# Patient Record
Sex: FEMALE | Race: WHITE | NOT HISPANIC OR LATINO | Employment: UNEMPLOYED | URBAN - METROPOLITAN AREA
[De-identification: names, ages, dates, MRNs, and addresses within clinical notes are randomized per-mention and may not be internally consistent; named-entity substitution may affect disease eponyms.]

---

## 2017-02-20 DIAGNOSIS — M54.30 SCIATICA, UNSPECIFIED LATERALITY: ICD-10-CM

## 2017-02-20 DIAGNOSIS — M48.061 LUMBAR SPINAL STENOSIS: ICD-10-CM

## 2017-02-20 DIAGNOSIS — M47.816 LUMBAR FACET ARTHROPATHY: ICD-10-CM

## 2017-02-21 RX ORDER — TRAMADOL HYDROCHLORIDE AND ACETAMINOPHEN 37.5; 325 MG/1; MG/1
TABLET, FILM COATED ORAL
Qty: 30 TABLET | Refills: 5 | Status: SHIPPED | OUTPATIENT
Start: 2017-02-21 | End: 2017-03-07 | Stop reason: SDUPTHER

## 2017-03-07 DIAGNOSIS — M47.816 LUMBAR FACET ARTHROPATHY: ICD-10-CM

## 2017-03-07 DIAGNOSIS — M54.30 SCIATICA, UNSPECIFIED LATERALITY: ICD-10-CM

## 2017-03-07 DIAGNOSIS — F41.1 GAD (GENERALIZED ANXIETY DISORDER): ICD-10-CM

## 2017-03-07 DIAGNOSIS — M48.061 LUMBAR SPINAL STENOSIS: ICD-10-CM

## 2017-03-07 DIAGNOSIS — E87.6 HYPOKALEMIA: ICD-10-CM

## 2017-03-07 DIAGNOSIS — F90.9 ATTENTION DEFICIT HYPERACTIVITY DISORDER (ADHD), UNSPECIFIED ADHD TYPE: ICD-10-CM

## 2017-03-07 RX ORDER — TRAMADOL HYDROCHLORIDE AND ACETAMINOPHEN 37.5; 325 MG/1; MG/1
TABLET, FILM COATED ORAL
Qty: 30 TABLET | Refills: 5 | Status: SHIPPED | OUTPATIENT
Start: 2017-03-07 | End: 2017-08-02 | Stop reason: SDUPTHER

## 2017-03-07 RX ORDER — CELECOXIB 200 MG/1
200 CAPSULE ORAL DAILY
Qty: 30 CAPSULE | Refills: 5 | Status: SHIPPED | OUTPATIENT
Start: 2017-03-07 | End: 2017-07-24

## 2017-03-07 RX ORDER — ALPRAZOLAM 0.5 MG/1
0.5 TABLET ORAL 2 TIMES DAILY PRN
Qty: 60 TABLET | Refills: 2 | Status: SHIPPED | OUTPATIENT
Start: 2017-03-07 | End: 2017-07-24 | Stop reason: SDUPTHER

## 2017-03-07 RX ORDER — CHLORTHALIDONE 25 MG/1
25 TABLET ORAL DAILY
Qty: 30 TABLET | Refills: 5 | Status: SHIPPED | OUTPATIENT
Start: 2017-03-07 | End: 2017-08-02 | Stop reason: SDUPTHER

## 2017-03-07 RX ORDER — POTASSIUM CHLORIDE 20 MEQ/1
20 TABLET, EXTENDED RELEASE ORAL 2 TIMES DAILY
Qty: 60 TABLET | Refills: 2 | Status: SHIPPED | OUTPATIENT
Start: 2017-03-07 | End: 2017-08-02 | Stop reason: SDUPTHER

## 2017-03-07 RX ORDER — DEXTROAMPHETAMINE SACCHARATE, AMPHETAMINE ASPARTATE, DEXTROAMPHETAMINE SULFATE AND AMPHETAMINE SULFATE 7.5; 7.5; 7.5; 7.5 MG/1; MG/1; MG/1; MG/1
TABLET ORAL
Qty: 60 TABLET | Refills: 0 | Status: SHIPPED | OUTPATIENT
Start: 2017-03-07 | End: 2017-05-23 | Stop reason: SDUPTHER

## 2017-03-07 NOTE — TELEPHONE ENCOUNTER
----- Message from Sylvain Villar sent at 3/7/2017  9:30 AM CST -----  Contact: Patient  Sylvain Robison  MRN: [id number]  : 1965  PCP: Diego Day  Home Phone      [phone]  Work Phone      Not on file.  Mobile          [phone]      MESSAGE: requesting written Rx for Adderall (due 3/13/17) --  lost his job & she can not afford to come for appt this mo -- has appt today @ 1:00 she would like to cancel if she can get Rx     Call [phone]    PCP: Barb

## 2017-04-17 RX ORDER — DEXTROAMPHETAMINE SACCHARATE, AMPHETAMINE ASPARTATE, DEXTROAMPHETAMINE SULFATE AND AMPHETAMINE SULFATE 7.5; 7.5; 7.5; 7.5 MG/1; MG/1; MG/1; MG/1
TABLET ORAL
Qty: 60 TABLET | Refills: 0 | Status: SHIPPED | OUTPATIENT
Start: 2017-04-17 | End: 2017-05-18 | Stop reason: SDUPTHER

## 2017-05-19 RX ORDER — DEXTROAMPHETAMINE SACCHARATE, AMPHETAMINE ASPARTATE, DEXTROAMPHETAMINE SULFATE AND AMPHETAMINE SULFATE 7.5; 7.5; 7.5; 7.5 MG/1; MG/1; MG/1; MG/1
TABLET ORAL
Qty: 60 TABLET | Refills: 0 | Status: SHIPPED | OUTPATIENT
Start: 2017-05-19 | End: 2017-07-24 | Stop reason: SDUPTHER

## 2017-05-23 DIAGNOSIS — F90.9 ATTENTION DEFICIT HYPERACTIVITY DISORDER (ADHD), UNSPECIFIED ADHD TYPE: ICD-10-CM

## 2017-05-23 RX ORDER — DEXTROAMPHETAMINE SACCHARATE, AMPHETAMINE ASPARTATE, DEXTROAMPHETAMINE SULFATE AND AMPHETAMINE SULFATE 7.5; 7.5; 7.5; 7.5 MG/1; MG/1; MG/1; MG/1
TABLET ORAL
Qty: 60 TABLET | Refills: 0 | Status: SHIPPED | OUTPATIENT
Start: 2017-05-23 | End: 2017-07-24 | Stop reason: SDUPTHER

## 2017-05-23 NOTE — TELEPHONE ENCOUNTER
----- Message from Ameya Beltrán sent at 2017 12:57 PM CDT -----  Contact: SELF  Sylvain Robison  MRN: [id number]  : 1965  PCP: Diego Day  Home Phone      [phone]  Work Phone      Not on file.  Mobile          [phone]      MESSAGE: NEEDS REFILL ON ADERRRAL. CAME INPUT NEW INSURANCE INTO OUT SYSTEM. PLEASE CALL WITH ANY QUESTIONS.     PHONE: [phone]    [facility]

## 2017-05-25 ENCOUNTER — TELEPHONE (OUTPATIENT)
Dept: FAMILY MEDICINE | Facility: CLINIC | Age: 52
End: 2017-05-25

## 2017-05-25 NOTE — TELEPHONE ENCOUNTER
----- Message from Sylvain Villar sent at 2017  3:31 PM CDT -----  Contact: Patient  Sylvain Robison  MRN: [id number]  : 1965  PCP: Diego Day  Home Phone      [phone]  Work Phone      Not on file.  Mobile          [phone]      MESSAGE: would like to speak with nurse Re: PA on Adderall -- some confusion    Call [phone]    PCP: Barb

## 2017-06-01 DIAGNOSIS — E87.6 HYPOKALEMIA: ICD-10-CM

## 2017-06-01 RX ORDER — POTASSIUM CHLORIDE 20 MEQ/1
TABLET, EXTENDED RELEASE ORAL
Qty: 60 TABLET | Refills: 2 | Status: SHIPPED | OUTPATIENT
Start: 2017-06-01 | End: 2017-07-24

## 2017-06-26 DIAGNOSIS — F90.9 ATTENTION DEFICIT HYPERACTIVITY DISORDER (ADHD), UNSPECIFIED ADHD TYPE: ICD-10-CM

## 2017-06-26 NOTE — TELEPHONE ENCOUNTER
Tell patient that Medicaid does not cover stimulants of any kind in adults.  She will have to pay cash for it.  Asked if she once a prescription or not.

## 2017-06-26 NOTE — TELEPHONE ENCOUNTER
----- Message from Sylvain Villar sent at 2017 10:42 AM CDT -----  Contact: Patient  Sylvain Robison  MRN: [id number]  : 1965  PCP: Diego Day  Home Phone      [phone]  Work Phone      Not on file.  Mobile          [phone]      MESSAGE: needs to cancel appt this afternoon --  has to be @ [facility] @ the same time -- requesting refills -- Adderall not covered by medicaid -- asking for possible change in medication    Call [phone]    PCP: Barb

## 2017-06-26 NOTE — TELEPHONE ENCOUNTER
LOV 12/7/16, adderall rx last printed on 5/23/17  Adderall not covered by medicaid -- asking for possible change in medication      Pt had appt today, but had  to cancel appt this afternoon --  has to be @ [facility] for lab work @ the same time -- requesting refills --

## 2017-06-27 NOTE — TELEPHONE ENCOUNTER
Spoke with Demond at [facility], he suggested for lowest cost is generic adderall tablets 30mg 1 tablet twice a day, the same that she was on,please advise,thank you

## 2017-06-27 NOTE — TELEPHONE ENCOUNTER
Spoke with pt, she will call pharmacist at [facility] to find out cost of adhd meds that would be cheaper than her adderall. The pharmacist will call with choice of meds.

## 2017-06-28 ENCOUNTER — HOSPITAL ENCOUNTER (EMERGENCY)
Facility: HOSPITAL | Age: 52
Discharge: HOME | End: 2017-06-28
Attending: SURGERY
Payer: MEDICAID

## 2017-06-28 VITALS
WEIGHT: 164 LBS | BODY MASS INDEX: 29.05 KG/M2 | RESPIRATION RATE: 16 BRPM | SYSTOLIC BLOOD PRESSURE: 140 MMHG | DIASTOLIC BLOOD PRESSURE: 70 MMHG | TEMPERATURE: 98 F | HEART RATE: 88 BPM

## 2017-06-28 DIAGNOSIS — M10.9 ACUTE GOUT OF RIGHT FOOT, UNSPECIFIED CAUSE: Primary | ICD-10-CM

## 2017-06-28 DIAGNOSIS — M79.671 RIGHT FOOT PAIN: ICD-10-CM

## 2017-06-28 LAB
ALBUMIN SERPL BCP-MCNC: 3.3 G/DL
ALP SERPL-CCNC: 99 U/L
ALT SERPL W/O P-5'-P-CCNC: 11 U/L
ANION GAP SERPL CALC-SCNC: 11 MMOL/L
AST SERPL-CCNC: 13 U/L
BASOPHILS # BLD AUTO: 0.04 K/UL
BASOPHILS NFR BLD: 0.3 %
BILIRUB SERPL-MCNC: 0.3 MG/DL
BUN SERPL-MCNC: 12 MG/DL
CALCIUM SERPL-MCNC: 9.4 MG/DL
CHLORIDE SERPL-SCNC: 104 MMOL/L
CO2 SERPL-SCNC: 26 MMOL/L
CREAT SERPL-MCNC: 0.9 MG/DL
DIFFERENTIAL METHOD: ABNORMAL
EOSINOPHIL # BLD AUTO: 0.3 K/UL
EOSINOPHIL NFR BLD: 1.8 %
ERYTHROCYTE [DISTWIDTH] IN BLOOD BY AUTOMATED COUNT: 14.7 %
EST. GFR  (AFRICAN AMERICAN): >60 ML/MIN/1.73 M^2
EST. GFR  (NON AFRICAN AMERICAN): >60 ML/MIN/1.73 M^2
GLUCOSE SERPL-MCNC: 114 MG/DL
HCT VFR BLD AUTO: 43.5 %
HGB BLD-MCNC: 14.3 G/DL
LYMPHOCYTES # BLD AUTO: 2.6 K/UL
LYMPHOCYTES NFR BLD: 18.5 %
MCH RBC QN AUTO: 29.1 PG
MCHC RBC AUTO-ENTMCNC: 32.9 %
MCV RBC AUTO: 89 FL
MONOCYTES # BLD AUTO: 1.5 K/UL
MONOCYTES NFR BLD: 10.5 %
NEUTROPHILS # BLD AUTO: 9.6 K/UL
NEUTROPHILS NFR BLD: 68.9 %
PLATELET # BLD AUTO: 302 K/UL
PMV BLD AUTO: 9.9 FL
POTASSIUM SERPL-SCNC: 3 MMOL/L
PROT SERPL-MCNC: 7.1 G/DL
RBC # BLD AUTO: 4.91 M/UL
SODIUM SERPL-SCNC: 141 MMOL/L
URATE SERPL-MCNC: 6.7 MG/DL
WBC # BLD AUTO: 13.95 K/UL

## 2017-06-28 PROCEDURE — 85025 COMPLETE CBC W/AUTO DIFF WBC: CPT

## 2017-06-28 PROCEDURE — 99284 EMERGENCY DEPT VISIT MOD MDM: CPT | Mod: 25

## 2017-06-28 PROCEDURE — 63600175 PHARM REV CODE 636 W HCPCS: Performed by: SURGERY

## 2017-06-28 PROCEDURE — 36415 COLL VENOUS BLD VENIPUNCTURE: CPT

## 2017-06-28 PROCEDURE — 80053 COMPREHEN METABOLIC PANEL: CPT

## 2017-06-28 PROCEDURE — 84550 ASSAY OF BLOOD/URIC ACID: CPT

## 2017-06-28 PROCEDURE — 96372 THER/PROPH/DIAG INJ SC/IM: CPT

## 2017-06-28 RX ORDER — KETOROLAC TROMETHAMINE 30 MG/ML
60 INJECTION, SOLUTION INTRAMUSCULAR; INTRAVENOUS
Status: COMPLETED | OUTPATIENT
Start: 2017-06-28 | End: 2017-06-28

## 2017-06-28 RX ORDER — ALLOPURINOL 300 MG/1
300 TABLET ORAL DAILY
Qty: 30 TABLET | Refills: 0 | Status: SHIPPED | OUTPATIENT
Start: 2017-06-28 | End: 2017-07-28

## 2017-06-28 RX ORDER — COLCHICINE 0.6 MG/1
0.6 TABLET ORAL DAILY PRN
Qty: 20 TABLET | Refills: 0 | Status: SHIPPED | OUTPATIENT
Start: 2017-06-28 | End: 2017-07-30 | Stop reason: ALTCHOICE

## 2017-06-28 RX ORDER — KETOROLAC TROMETHAMINE 10 MG/1
10 TABLET, FILM COATED ORAL EVERY 6 HOURS PRN
Qty: 15 TABLET | Refills: 0 | Status: SHIPPED | OUTPATIENT
Start: 2017-06-28 | End: 2017-07-24

## 2017-06-28 RX ORDER — DEXTROAMPHETAMINE SACCHARATE, AMPHETAMINE ASPARTATE, DEXTROAMPHETAMINE SULFATE AND AMPHETAMINE SULFATE 7.5; 7.5; 7.5; 7.5 MG/1; MG/1; MG/1; MG/1
TABLET ORAL
Qty: 60 TABLET | Refills: 0 | Status: SHIPPED | OUTPATIENT
Start: 2017-06-28 | End: 2017-07-24 | Stop reason: SDUPTHER

## 2017-06-28 RX ADMIN — KETOROLAC TROMETHAMINE 60 MG: 30 INJECTION, SOLUTION INTRAMUSCULAR at 09:06

## 2017-06-29 NOTE — ED PROVIDER NOTES
[facility]                                        June 28, 2017                   Chief Complaint  52 y.o. female with Foot Injury    History of Present Illness  Sylvain Robison presents to the emergency room with right foot swelling this week  Patient denies any trauma or fall, states her right foot began swelling last week  Patient on exam is mild right dorsal foot pain and swelling, no bruising noted now  Patient states it hurts to walk on the right foot, no erythema or signs of infection  Patient has good distal pulses and capillary refill, she is neurovascular intact now  Patient has an elevated uric acid, and a poor diet; consider gout in the differential    The history is provided by the patient  Medical history: ADHD, anxiety, carpal tunnel, cervical cancer  Surgical history: D&C, hysterectomy, carpal tunnel, vocal CORD lateralization  ALLERGIES: Codeine  Social history: , smoker, unemployed, with children  Family history: HTN, COPD, cancer    Review of Systems and Physical Exam     Review of Systems  -- Constitution - no fever, denies fatigue, no weakness, no chills  -- Eyes - no tearing or redness, no visual disturbance  -- Ear, Nose - no tinnitus or earache, no nasal congestion or discharge  -- Mouth,Throat - no sore throat, no toothache, normal voice, normal swallowing  -- Respiratory - denies cough and congestion, no shortness of breath, no OROURKE  -- Cardiovascular - denies chest pain, no palpitations, denies claudication  -- Gastrointestinal - denies abdominal pain, nausea, vomiting, or diarrhea  -- Musculoskeletal - right foot pain  -- Neurological - no headache, denies weakness or seizure; no LOC  -- Skin - denies pallor, rash, or changes in skin. no hives or welts noted    Vital Signs  -- Her blood pressure is 143/74 and her pulse is 88. Her respiration is 16.      Physical Exam  -- Nursing note and vitals reviewed   -- Head: Atraumatic. Normocephalic. No obvious  abnormality  -- Eyes: Pupils are equal and reactive to light. Normal conjunctiva and lids  -- Cardiac: Normal rate, regular rhythm and normal heart sounds  -- Pulmonary: Normal respiratory effort, breath sounds clear to auscultation  -- Abdominal: Soft, no tenderness. Normal bowel sounds. Normal liver edge  -- Musculoskeletal: Mild dorsal right foot swelling  -- Neurological: No focal deficits. Showed good interaction with staff  -- Vascular: Posterior tibial, dorsalis pedis and radial pulses 2+ bilaterally    -- Lymphatics: No cervical or peripheral lymphadenopathy. No edema noted  -- Skin: Warm and dry. No evidence of rash or cellulitis    Emergency Room Course     Treatment and Evaluation  -- Preliminary ER x-ray readings showed no evidence of fracture or dislocation  -- All x-rays are reviewed with a final disposition given by the radiologist   -- White blood cell count 13,000  -- The electrolytes drawn in the ER today were within normal limits   -- Uric acid is normal  -- IM 60 mg Toradol given today in the ER    Diagnosis  -- The encounter diagnosis was Right foot pain.    Disposition and Plan  -- Disposition: home  -- Condition: stable  -- Follow-up: Patient to follow up with Diego Day MD in 1-2 days.  -- I advised the patient that we have found no life threatening condition today  -- At this time, I believe the patient is clinically stable for discharge.   -- The patient acknowledges that close follow up with a MD is required   -- Patient agrees to comply with all instruction and direction given in the ER    This note is dictated on Dragon Natural Speaking word recognition program.  There are word recognition mistakes that are occasionally missed on review.           Jd Nguynễ MD  06/28/17 2652

## 2017-07-01 DIAGNOSIS — I10 BENIGN ESSENTIAL HTN: ICD-10-CM

## 2017-07-02 DIAGNOSIS — F41.1 GAD (GENERALIZED ANXIETY DISORDER): ICD-10-CM

## 2017-07-03 RX ORDER — CHLORTHALIDONE 25 MG/1
TABLET ORAL
Qty: 30 TABLET | Refills: 5 | Status: SHIPPED | OUTPATIENT
Start: 2017-07-03 | End: 2017-07-24 | Stop reason: SDUPTHER

## 2017-07-03 RX ORDER — ALPRAZOLAM 0.5 MG/1
TABLET ORAL
Qty: 60 TABLET | Refills: 2 | Status: SHIPPED | OUTPATIENT
Start: 2017-07-03 | End: 2017-07-24 | Stop reason: SDUPTHER

## 2017-07-24 ENCOUNTER — OFFICE VISIT (OUTPATIENT)
Dept: FAMILY MEDICINE | Facility: CLINIC | Age: 52
End: 2017-07-24
Payer: MEDICAID

## 2017-07-24 VITALS
WEIGHT: 162 LBS | DIASTOLIC BLOOD PRESSURE: 68 MMHG | RESPIRATION RATE: 16 BRPM | TEMPERATURE: 100 F | SYSTOLIC BLOOD PRESSURE: 112 MMHG | HEART RATE: 100 BPM | BODY MASS INDEX: 28.7 KG/M2 | HEIGHT: 63 IN

## 2017-07-24 DIAGNOSIS — J02.9 SORE THROAT: Primary | ICD-10-CM

## 2017-07-24 DIAGNOSIS — J02.9 PHARYNGITIS, UNSPECIFIED ETIOLOGY: ICD-10-CM

## 2017-07-24 DIAGNOSIS — F90.9 ATTENTION DEFICIT HYPERACTIVITY DISORDER (ADHD), UNSPECIFIED ADHD TYPE: ICD-10-CM

## 2017-07-24 DIAGNOSIS — F41.1 GAD (GENERALIZED ANXIETY DISORDER): ICD-10-CM

## 2017-07-24 LAB
CTP QC/QA: YES
S PYO RRNA THROAT QL PROBE: NEGATIVE

## 2017-07-24 PROCEDURE — 99213 OFFICE O/P EST LOW 20 MIN: CPT | Mod: PBBFAC | Performed by: NURSE PRACTITIONER

## 2017-07-24 PROCEDURE — 99213 OFFICE O/P EST LOW 20 MIN: CPT | Mod: 25,S$PBB,, | Performed by: NURSE PRACTITIONER

## 2017-07-24 PROCEDURE — 96372 THER/PROPH/DIAG INJ SC/IM: CPT | Mod: PBBFAC

## 2017-07-24 PROCEDURE — 99999 PR PBB SHADOW E&M-EST. PATIENT-LVL III: CPT | Mod: PBBFAC,,, | Performed by: NURSE PRACTITIONER

## 2017-07-24 PROCEDURE — 87880 STREP A ASSAY W/OPTIC: CPT | Mod: PBBFAC | Performed by: NURSE PRACTITIONER

## 2017-07-24 RX ORDER — DEXTROAMPHETAMINE SACCHARATE, AMPHETAMINE ASPARTATE, DEXTROAMPHETAMINE SULFATE AND AMPHETAMINE SULFATE 7.5; 7.5; 7.5; 7.5 MG/1; MG/1; MG/1; MG/1
TABLET ORAL
Qty: 60 TABLET | Refills: 0 | Status: SHIPPED | OUTPATIENT
Start: 2017-07-24 | End: 2017-07-24 | Stop reason: SDUPTHER

## 2017-07-24 RX ORDER — ALPRAZOLAM 0.5 MG/1
0.5 TABLET ORAL 2 TIMES DAILY PRN
Qty: 60 TABLET | Refills: 2 | Status: SHIPPED | OUTPATIENT
Start: 2017-07-24 | End: 2017-09-27 | Stop reason: SDUPTHER

## 2017-07-24 RX ORDER — METHYLPREDNISOLONE ACETATE 40 MG/ML
60 INJECTION, SUSPENSION INTRA-ARTICULAR; INTRALESIONAL; INTRAMUSCULAR; SOFT TISSUE
Status: COMPLETED | OUTPATIENT
Start: 2017-07-24 | End: 2017-07-24

## 2017-07-24 RX ORDER — DEXTROAMPHETAMINE SACCHARATE, AMPHETAMINE ASPARTATE, DEXTROAMPHETAMINE SULFATE AND AMPHETAMINE SULFATE 7.5; 7.5; 7.5; 7.5 MG/1; MG/1; MG/1; MG/1
TABLET ORAL
Qty: 60 TABLET | Refills: 0 | Status: SHIPPED | OUTPATIENT
Start: 2017-07-24 | End: 2017-08-02 | Stop reason: SDUPTHER

## 2017-07-24 RX ORDER — AZITHROMYCIN 500 MG/1
500 TABLET, FILM COATED ORAL DAILY
Qty: 3 TABLET | Refills: 0 | Status: SHIPPED | OUTPATIENT
Start: 2017-07-24 | End: 2017-07-27

## 2017-07-24 RX ADMIN — METHYLPREDNISOLONE ACETATE 60 MG: 40 INJECTION, SUSPENSION INTRA-ARTICULAR; INTRALESIONAL; INTRAMUSCULAR; SOFT TISSUE at 03:07

## 2017-07-24 NOTE — PROGRESS NOTES
Subjective:       Patient ID: Sylvain Robison is a 52 y.o. female.    Chief Complaint: Sore Throat and Medication Refill    Sore Throat    Episode onset: 2-3 days ago. The problem has been gradually worsening. There has been no fever. Associated symptoms include congestion, coughing, a hoarse voice and shortness of breath. Pertinent negatives include no abdominal pain, diarrhea, ear pain, headaches or vomiting.   Medication Refill   Associated symptoms include congestion, coughing, fatigue and a sore throat. Pertinent negatives include no abdominal pain, arthralgias, fever, headaches, myalgias, nausea or vomiting.     Review of Systems   Constitutional: Positive for fatigue. Negative for appetite change and fever.   HENT: Positive for congestion, hoarse voice, postnasal drip, rhinorrhea and sore throat. Negative for ear pain.    Eyes: Negative.  Negative for visual disturbance.   Respiratory: Positive for cough and shortness of breath. Negative for wheezing.    Cardiovascular: Negative.    Gastrointestinal: Negative.  Negative for abdominal pain, diarrhea, nausea and vomiting.   Endocrine: Negative.    Genitourinary: Negative.  Negative for difficulty urinating and urgency.   Musculoskeletal: Negative.  Negative for arthralgias and myalgias.   Skin: Negative.  Negative for color change.   Allergic/Immunologic: Negative.    Neurological: Negative.  Negative for dizziness and headaches.   Hematological: Negative.    Psychiatric/Behavioral: Negative.  Negative for sleep disturbance.   All other systems reviewed and are negative.      Objective:      Physical Exam   Constitutional: She is oriented to person, place, and time. She appears well-developed and well-nourished. No distress.   HENT:   Head: Normocephalic and atraumatic.   Right Ear: External ear normal. A middle ear effusion is present.   Left Ear: External ear normal. A middle ear effusion is present.   Nose: Mucosal edema and rhinorrhea present.    Mouth/Throat: Posterior oropharyngeal edema and posterior oropharyngeal erythema present.   Hoarse voice   Eyes: Conjunctivae are normal. Pupils are equal, round, and reactive to light.   Neck: Normal range of motion. Neck supple.   Cardiovascular: Normal rate and normal heart sounds.    No murmur heard.  Pulmonary/Chest: Effort normal and breath sounds normal. No respiratory distress.   Abdominal: Soft.   Musculoskeletal: Normal range of motion.   Lymphadenopathy:     She has no cervical adenopathy.   Neurological: She is alert and oriented to person, place, and time.   Skin: Skin is warm and dry.   Psychiatric: She has a normal mood and affect.   Nursing note and vitals reviewed.      Assessment:       1. Sore throat    2. Attention deficit hyperactivity disorder (ADHD), unspecified ADHD type    3. Pharyngitis, unspecified etiology    4. GIOVANA (generalized anxiety disorder)        Plan:   Sylvain was seen today for sore throat and medication refill.    Diagnoses and all orders for this visit:    Sore throat  -     POCT rapid strep A - negative    Attention deficit hyperactivity disorder (ADHD), unspecified ADHD type  -     dextroamphetamine-amphetamine (ADDERALL) 30 mg Tab; 1 po q am, noon    Pharyngitis, unspecified etiology  -     azithromycin (ZITHROMAX) 500 MG tablet; Take 1 tablet (500 mg total) by mouth once daily.  -     methylPREDNISolone acetate injection 60 mg; Inject 1.5 mLs (60 mg total) into the muscle one time.    GIOVANA (generalized anxiety disorder)  -     alprazolam (XANAX) 0.5 MG tablet; Take 1 tablet (0.5 mg total) by mouth 2 (two) times daily as needed.    RTC PRN - follow up for ADD

## 2017-07-30 ENCOUNTER — HOSPITAL ENCOUNTER (EMERGENCY)
Facility: HOSPITAL | Age: 52
Discharge: HOME | End: 2017-07-30
Attending: SURGERY
Payer: MEDICAID

## 2017-07-30 VITALS
SYSTOLIC BLOOD PRESSURE: 181 MMHG | HEART RATE: 98 BPM | DIASTOLIC BLOOD PRESSURE: 89 MMHG | RESPIRATION RATE: 20 BRPM | TEMPERATURE: 98 F

## 2017-07-30 DIAGNOSIS — M10.9 GOUT OF RIGHT FOOT, UNSPECIFIED CAUSE, UNSPECIFIED CHRONICITY: Primary | ICD-10-CM

## 2017-07-30 PROCEDURE — 99283 EMERGENCY DEPT VISIT LOW MDM: CPT

## 2017-07-30 RX ORDER — KETOROLAC TROMETHAMINE 10 MG/1
10 TABLET, FILM COATED ORAL EVERY 6 HOURS PRN
Qty: 15 TABLET | Refills: 0 | Status: SHIPPED | OUTPATIENT
Start: 2017-07-30 | End: 2018-05-01

## 2017-07-30 RX ORDER — ALLOPURINOL 300 MG/1
300 TABLET ORAL DAILY
Qty: 30 TABLET | Refills: 0 | Status: SHIPPED | OUTPATIENT
Start: 2017-07-30 | End: 2017-08-02 | Stop reason: SDUPTHER

## 2017-07-31 ENCOUNTER — TELEPHONE (OUTPATIENT)
Dept: FAMILY MEDICINE | Facility: CLINIC | Age: 52
End: 2017-07-31

## 2017-07-31 NOTE — TELEPHONE ENCOUNTER
----- Message from Niurka Garza sent at 2017 10:27 AM CDT -----  Contact: Self  Sylvain Robison  MRN: [id number]  : 1965  PCP: Diego Day  Home Phone      [phone]  Work Phone      Not on file.  Mobile          [phone]    MESSAGE: Would like to speak to nurse about meds.  Please call.    Phone:  [phone]

## 2017-07-31 NOTE — ED PROVIDER NOTES
[facility]                                        July 30, 2017                   Chief Complaint  52 y.o. female with Gout    History of Present Illness  Sylvain Robison presents to the emergency room with right foot gout this week  Patient has had recurrent right foot gout over the last 2-3 years now, worse here  Patient on exam is gouty changes to the right foot with no cellulitis or swelling  Patient has good distal pulses and capillary refill, is neurovascular intact now  Patient states that she did not have her gout medicines refilled by the PCP    The history is provided by the patient  Medical history: ADHD, anxiety, carpal tunnel, cervical cancer  Surgical history: D&C, hysterectomy, carpal tunnel, vocal CORD lateralization  ALLERGIES: Codeine  Social history: , smoker, unemployed, with children  Family history: HTN, COPD, cancer    Review of Systems and Physical Exam     Review of Systems  -- Constitution - no fever, denies fatigue, no weakness, no chills  -- Eyes - no tearing or redness, no visual disturbance  -- Ear, Nose - no tinnitus or earache, no nasal congestion or discharge  -- Mouth,Throat - no sore throat, no toothache, normal voice, normal swallowing  -- Respiratory - denies cough and congestion, no shortness of breath, no OROURKE  -- Cardiovascular - denies chest pain, no palpitations, denies claudication  -- Gastrointestinal - denies abdominal pain, nausea, vomiting, or diarrhea  -- Genitourinary - no dysuria, no denies flank pain, no hematuria or frequency   -- Musculoskeletal - right foot gout this week  -- Neurological - no headache, denies weakness or seizure; no LOC  -- Skin - denies pallor, rash, or changes in skin. no hives or welts noted    Vital Signs  --  Her blood pressure is 181/89 and her pulse is 98. Her respiration is 20.      Physical Exam  -- Nursing note and vitals reviewed  -- Head: Atraumatic. Normocephalic. No obvious abnormality  -- Eyes: Pupils are  equal and reactive to light. Normal conjunctiva and lids  -- Cardiac: Normal rate, regular rhythm and normal heart sounds  -- Pulmonary: Normal respiratory effort, breath sounds clear to auscultation  -- Abdominal: Soft, no tenderness. Normal bowel sounds. Normal liver edge  -- Musculoskeletal: Mild gouty changes in the right foot  -- Neurological: No focal deficits. Showed good interaction with staff  -- Vascular: Posterior tibial, dorsalis pedis and radial pulses 2+ bilaterally      Emergency Room Course     Treatment and Evaluation  -- IM 60 mg Toradol given today in the ER    Diagnosis  -- The encounter diagnosis was Gout of right foot, unspecified cause, unspecified chronicity.    Disposition and Plan  -- Disposition: home  -- Condition: stable  -- Follow-up: Patient to follow up with Diego Day MD in 1-2 days.  -- I advised the patient that we have found no life threatening condition today  -- At this time, I believe the patient is clinically stable for discharge.   -- The patient acknowledges that close follow up with a MD is required   -- Patient agrees to comply with all instruction and direction given in the ER    This note is dictated on Dragon Natural Speaking word recognition program.  There are word recognition mistakes that are occasionally missed on review.           Jd Nguyễn MD  07/31/17 0103

## 2017-08-02 ENCOUNTER — OFFICE VISIT (OUTPATIENT)
Dept: FAMILY MEDICINE | Facility: CLINIC | Age: 52
End: 2017-08-02
Payer: MEDICAID

## 2017-08-02 VITALS
SYSTOLIC BLOOD PRESSURE: 134 MMHG | HEIGHT: 62 IN | RESPIRATION RATE: 20 BRPM | DIASTOLIC BLOOD PRESSURE: 78 MMHG | BODY MASS INDEX: 30.47 KG/M2 | WEIGHT: 165.56 LBS | HEART RATE: 96 BPM

## 2017-08-02 DIAGNOSIS — F90.9 ATTENTION DEFICIT HYPERACTIVITY DISORDER (ADHD), UNSPECIFIED ADHD TYPE: ICD-10-CM

## 2017-08-02 DIAGNOSIS — M1A.0710 IDIOPATHIC CHRONIC GOUT OF RIGHT FOOT WITHOUT TOPHUS: ICD-10-CM

## 2017-08-02 DIAGNOSIS — F41.9 ANXIETY: Primary | ICD-10-CM

## 2017-08-02 DIAGNOSIS — M48.061 LUMBAR SPINAL STENOSIS: ICD-10-CM

## 2017-08-02 DIAGNOSIS — I10 BENIGN ESSENTIAL HTN: ICD-10-CM

## 2017-08-02 DIAGNOSIS — M47.816 LUMBAR FACET ARTHROPATHY: ICD-10-CM

## 2017-08-02 DIAGNOSIS — M54.30 SCIATICA, UNSPECIFIED LATERALITY: ICD-10-CM

## 2017-08-02 DIAGNOSIS — E87.6 HYPOKALEMIA: ICD-10-CM

## 2017-08-02 LAB
ANION GAP SERPL CALC-SCNC: 9 MMOL/L
BUN SERPL-MCNC: 16 MG/DL
CALCIUM SERPL-MCNC: 10.1 MG/DL
CHLORIDE SERPL-SCNC: 102 MMOL/L
CO2 SERPL-SCNC: 31 MMOL/L
CREAT SERPL-MCNC: 1 MG/DL
EST. GFR  (AFRICAN AMERICAN): >60 ML/MIN/1.73 M^2
EST. GFR  (NON AFRICAN AMERICAN): >60 ML/MIN/1.73 M^2
GLUCOSE SERPL-MCNC: 98 MG/DL
POTASSIUM SERPL-SCNC: 4.2 MMOL/L
SODIUM SERPL-SCNC: 142 MMOL/L

## 2017-08-02 PROCEDURE — 99214 OFFICE O/P EST MOD 30 MIN: CPT | Mod: S$PBB,,, | Performed by: FAMILY MEDICINE

## 2017-08-02 PROCEDURE — 99999 PR PBB SHADOW E&M-EST. PATIENT-LVL III: CPT | Mod: PBBFAC,,, | Performed by: FAMILY MEDICINE

## 2017-08-02 PROCEDURE — 36415 COLL VENOUS BLD VENIPUNCTURE: CPT | Mod: PBBFAC

## 2017-08-02 PROCEDURE — 80048 BASIC METABOLIC PNL TOTAL CA: CPT

## 2017-08-02 PROCEDURE — 99213 OFFICE O/P EST LOW 20 MIN: CPT | Mod: PBBFAC | Performed by: FAMILY MEDICINE

## 2017-08-02 RX ORDER — COLCHICINE 0.6 MG/1
0.6 TABLET ORAL DAILY
COMMUNITY
End: 2017-08-02 | Stop reason: SDUPTHER

## 2017-08-02 RX ORDER — DEXTROAMPHETAMINE SACCHARATE, AMPHETAMINE ASPARTATE, DEXTROAMPHETAMINE SULFATE AND AMPHETAMINE SULFATE 7.5; 7.5; 7.5; 7.5 MG/1; MG/1; MG/1; MG/1
TABLET ORAL
Qty: 60 TABLET | Refills: 0 | Status: SHIPPED | OUTPATIENT
Start: 2017-09-22 | End: 2017-09-27 | Stop reason: SDUPTHER

## 2017-08-02 RX ORDER — TRAMADOL HYDROCHLORIDE AND ACETAMINOPHEN 37.5; 325 MG/1; MG/1
TABLET, FILM COATED ORAL
Qty: 30 TABLET | Refills: 5 | Status: SHIPPED | OUTPATIENT
Start: 2017-08-02 | End: 2017-12-20 | Stop reason: SDUPTHER

## 2017-08-02 RX ORDER — CHLORTHALIDONE 25 MG/1
25 TABLET ORAL DAILY
Qty: 30 TABLET | Refills: 5 | Status: SHIPPED | OUTPATIENT
Start: 2017-08-02 | End: 2017-12-20 | Stop reason: SDUPTHER

## 2017-08-02 RX ORDER — POTASSIUM CHLORIDE 20 MEQ/1
20 TABLET, EXTENDED RELEASE ORAL 2 TIMES DAILY
Qty: 60 TABLET | Refills: 2 | Status: SHIPPED | OUTPATIENT
Start: 2017-08-02 | End: 2017-09-27 | Stop reason: SINTOL

## 2017-08-02 RX ORDER — GABAPENTIN 300 MG/1
300 CAPSULE ORAL 2 TIMES DAILY
COMMUNITY
End: 2017-12-20 | Stop reason: SDUPTHER

## 2017-08-02 RX ORDER — ALLOPURINOL 300 MG/1
300 TABLET ORAL DAILY
Qty: 30 TABLET | Refills: 0 | Status: SHIPPED | OUTPATIENT
Start: 2017-08-02 | End: 2017-09-01

## 2017-08-02 RX ORDER — COLCHICINE 0.6 MG/1
0.6 TABLET ORAL DAILY
Qty: 30 TABLET | Refills: 5 | Status: SHIPPED | OUTPATIENT
Start: 2017-08-02 | End: 2018-03-21 | Stop reason: SDUPTHER

## 2017-08-02 RX ORDER — DEXTROAMPHETAMINE SACCHARATE, AMPHETAMINE ASPARTATE, DEXTROAMPHETAMINE SULFATE AND AMPHETAMINE SULFATE 7.5; 7.5; 7.5; 7.5 MG/1; MG/1; MG/1; MG/1
TABLET ORAL
Qty: 60 TABLET | Refills: 0 | Status: SHIPPED | OUTPATIENT
Start: 2017-08-23 | End: 2017-09-27 | Stop reason: SDUPTHER

## 2017-08-02 NOTE — PROGRESS NOTES
Pt is a 52 y.o. female who presents for check up for   Encounter Diagnoses   Name Primary?    Sciatica, unspecified laterality     Lumbar spinal stenosis     Lumbar facet arthropathy     Hypokalemia     Attention deficit hyperactivity disorder (ADHD), unspecified ADHD type     Anxiety Yes    Benign essential HTN     Idiopathic chronic gout of right foot without tophus    . Doing well on current meds. Denies any side effects. Prevention is up to date.    History of present illness:   Sylvain Robison is a 52 y.o. female here for a checkup for her elevated blood pressure.  She was started on chlorthalidone 25 mg every morning.  She tolerates it well.  Her blood pressure is better.  She is losing weight.    She recently went to the emergency room for severe right foot pain.  She was diagnosed with acute gout.  She's taking Toradol, colchicine, allopurinol.  Her foot pain has improved.    She is doing very well on Adderall.  She is not having trouble sleeping. She is Handling stress at home very well.  She is not having side effects from the medication such as palpitations, anxiety attacks.  She is able to focus and stay on track and get projects finished.  Her  is being treated for lymphoma recurrence and is scheduled for bone marrow transplant.  There is a lot of stress at home.     Complains of neck pain with pain and numbness radiating into her right shoulder.  Comes and goes.  Gabapentin and Anaprox helps.    X-ray was reviewed.  She has facet joint hypertrophy and loss of disc space at C5-6.  She also takes gabapentin which helps but it makes her sleepy.  She takes it twice daily.  Having sciatica pain in left leg.  Tried neurontin which helped.  MRI shows lumbar facet arthropathy, mild lumbar spinal stenosis.  Gabapentin has been very effective.  She is not taking her Celebrex.  She gets a lot of pain in her left hip and leg at night.  Tramadol usually helps but not lately.  She is seeing ortho, Dr.  Kee.  She received epidural sterile injections which helped.    Review of systems:  Gen.: No weight loss  HEENT: No headaches, sinus congestion, change in vision  Cardiovascular: No chest pain, palpitations  Respiratory: No cough, shortness of breath  Neurological: Sleeping well, no weakness, no syncope, normal memory, no seizure, no ticks  MSK:  Leg cramps    Physical exam:   Vitals:    08/02/17 0958   BP: 134/78   Pulse: 96   Resp: 20     Gen.: Well developed, well nourished white female in no apparent distress  HEENT: Pupils equal round reactive to light and accommodation, extraocular muscles intact, TMs are normal translucent mobile, neck is supple no lymphadenopathy no JVD, throat is clear.  Neck exam: Good range of motion.  Mild Spurling sign.  Cracking palpable.  Upper extremity strength equal and strong.  Upper extremity reflexes 2+ bilaterally   Heart:Cardiac exam revealed the PMI to be normally situated and sized. The rhythm was regular and no extrasystoles were noted during several minutes of auscultation. The first and second heart sounds were normal and physiologic splitting of the second heart sound was noted. There were no murmurs, rubs, clicks, or gallops.  Lungs:Lungs were clear to auscultation and percussion, and with normal diaphragmatic excursion. No wheezes or rales were noted.   Neuro: Neurologically, the patient was awake, alert, and oriented to person, place and time. There were no obvious focal neurologic abnormalities.  Extremities: No clubbing cyanosis or edema.  Right foot swollen and tender  Back: Moderate tenderness and spasm the lumbar region, negative straight leg raise, DTRs are 2+ and equal in knees and ankles.  Strength in the legs is 5+ equally.  Straight leg raise did cause lumbar pain and mild pain into the leg.    Lab Results   Component Value Date    LDLCALC 148.8 06/28/2016     Lab Results   Component Value Date    CREATININE 0.9 06/28/2017     Assessment/plan:      Attention deficit hyperactivity disorder - stable on current medication  The current medication will be changed to Adderall  15 minute discussion with the patient regarding the importance of proper sleep hygiene was completed. The patientwill be encouraged to go to bed at the same time and wake up at the same time even on the weekends. The patient was encouraged to continue giving the medication even on weekends and holidays.    Cervical spondylolysis.    Continue mobic and gabapentin.  Consider physical therapy, MRI, pain management if symptoms do not improve.    Lumbar facet arthropathy/moderate lumbar spinal stenosis on MRI/left sciatic pain  Celebrex  Gabapentin  Tramadol prn pain  Keep appt with pain management    Cervical spondylarthritis  Celebrex  Tramadol as needed    HTN (hypertension)  Two gram sodium diet.    Weight loss discussed.    Try to walk 2 miles per day.    Quit smoking.    Current medications will be:  Chlorthalidone (HYGROTEN) 25 MG Tab; Take 1 tablet (25 mg total) by mouth once daily.    Anxiety    Sciatica, unspecified laterality  -     tramadol-acetaminophen 37.5-325 mg (ULTRACET) 37.5-325 mg Tab; TAKE ONE TABLET BY MOUTH EVERY 6 HOURS AS NEEDED PAIN  Dispense: 30 tablet; Refill: 5    Lumbar spinal stenosis  -     tramadol-acetaminophen 37.5-325 mg (ULTRACET) 37.5-325 mg Tab; TAKE ONE TABLET BY MOUTH EVERY 6 HOURS AS NEEDED PAIN  Dispense: 30 tablet; Refill: 5    Lumbar facet arthropathy  -     tramadol-acetaminophen 37.5-325 mg (ULTRACET) 37.5-325 mg Tab; TAKE ONE TABLET BY MOUTH EVERY 6 HOURS AS NEEDED PAIN  Dispense: 30 tablet; Refill: 5    Hypokalemia  -     potassium chloride SA (K-DUR,KLOR-CON) 20 MEQ tablet; Take 1 tablet (20 mEq total) by mouth 2 (two) times daily.  Dispense: 60 tablet; Refill: 2  -     Basic metabolic panel; Future    Attention deficit hyperactivity disorder (ADHD), unspecified ADHD type  -     dextroamphetamine-amphetamine (ADDERALL) 30 mg Tab; 1 po q am, noon   Dispense: 60 tablet; Refill: 0  -     dextroamphetamine-amphetamine (ADDERALL) 30 mg Tab; 1 po q am, noon  Dispense: 60 tablet; Refill: 0    Benign essential HTN  -     chlorthalidone (HYGROTEN) 25 MG Tab; Take 1 tablet (25 mg total) by mouth once daily.  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  Dispense: 30 tablet; Refill: 0  -     colchicine (COLCRYS) 0.6 mg tablet; Take 1 tablet (0.6 mg total) by mouth once daily.  Dispense: 30 tablet; Refill: 5      The next appointment will be every 2 months.  I'll check a uric acid level at that time.

## 2017-08-03 ENCOUNTER — TELEPHONE (OUTPATIENT)
Dept: FAMILY MEDICINE | Facility: CLINIC | Age: 52
End: 2017-08-03

## 2017-08-08 NOTE — TELEPHONE ENCOUNTER
Received denial of adderall due to:  Needs history of substance dependency including enrollment and active participation in a substance abuse treatment program and [id number] with urine drug screen that are negative for substance in question.  Faxed to [facility]

## 2017-08-10 DIAGNOSIS — M47.816 LUMBAR FACET ARTHROPATHY: ICD-10-CM

## 2017-08-10 DIAGNOSIS — M54.30 SCIATICA, UNSPECIFIED LATERALITY: ICD-10-CM

## 2017-08-10 DIAGNOSIS — M47.812 CERVICAL SPONDYLARTHRITIS: ICD-10-CM

## 2017-08-11 RX ORDER — GABAPENTIN 300 MG/1
CAPSULE ORAL
Qty: 60 CAPSULE | Refills: 5 | Status: SHIPPED | OUTPATIENT
Start: 2017-08-11 | End: 2017-09-27 | Stop reason: SDUPTHER

## 2017-08-22 DIAGNOSIS — Z12.11 COLON CANCER SCREENING: ICD-10-CM

## 2017-09-14 ENCOUNTER — PATIENT OUTREACH (OUTPATIENT)
Dept: ADMINISTRATIVE | Facility: HOSPITAL | Age: 52
End: 2017-09-14

## 2017-09-14 NOTE — LETTER
September 14, 2017    Sylvain Robison  [address] [facility] [address]             [facility]  [address]  [facility] [address]  Phone: [phone] Dear Ms. Robison:    [facility] is committed to your overall health.  To help you get the most out of each of your visits, we will review your information to make sure you are up to date on all of your recommended tests and/or procedures.      Dr. Day has found that you may be due for a tetanus vaccine, a pneumonia vaccine, a flu vaccine, a mammogram, a colonoscopy, a fasting cholesterol blood test, a Hepatitis C screening.     If you have had any of the above done at another facility, please bring the records or information with you so that your record at [facility] will be complete.  If you would like to schedule any of these, please contact me.    If you are currently taking medication, please bring it with you to your appointment for review.    If you have any questions or concerns, please don't hesitate to call.    Sincerely,        Vanessa Bagley LPN Clinical Care Coordinator  [facility]  [phone]

## 2017-09-27 ENCOUNTER — OFFICE VISIT (OUTPATIENT)
Dept: FAMILY MEDICINE | Facility: CLINIC | Age: 52
End: 2017-09-27
Payer: MEDICAID

## 2017-09-27 VITALS
BODY MASS INDEX: 30.44 KG/M2 | DIASTOLIC BLOOD PRESSURE: 80 MMHG | RESPIRATION RATE: 20 BRPM | WEIGHT: 165.38 LBS | HEIGHT: 62 IN | HEART RATE: 76 BPM | SYSTOLIC BLOOD PRESSURE: 122 MMHG

## 2017-09-27 DIAGNOSIS — M1A.0710 IDIOPATHIC CHRONIC GOUT OF RIGHT FOOT WITHOUT TOPHUS: Primary | ICD-10-CM

## 2017-09-27 DIAGNOSIS — M54.30 SCIATICA, UNSPECIFIED LATERALITY: ICD-10-CM

## 2017-09-27 DIAGNOSIS — F41.1 GAD (GENERALIZED ANXIETY DISORDER): ICD-10-CM

## 2017-09-27 DIAGNOSIS — M48.061 LUMBAR SPINAL STENOSIS: ICD-10-CM

## 2017-09-27 DIAGNOSIS — F90.9 ATTENTION DEFICIT HYPERACTIVITY DISORDER (ADHD), UNSPECIFIED ADHD TYPE: ICD-10-CM

## 2017-09-27 DIAGNOSIS — M47.816 LUMBAR FACET ARTHROPATHY: ICD-10-CM

## 2017-09-27 PROCEDURE — 3079F DIAST BP 80-89 MM HG: CPT | Mod: ,,, | Performed by: FAMILY MEDICINE

## 2017-09-27 PROCEDURE — 99214 OFFICE O/P EST MOD 30 MIN: CPT | Mod: S$PBB,,, | Performed by: FAMILY MEDICINE

## 2017-09-27 PROCEDURE — 3008F BODY MASS INDEX DOCD: CPT | Mod: ,,, | Performed by: FAMILY MEDICINE

## 2017-09-27 PROCEDURE — 3074F SYST BP LT 130 MM HG: CPT | Mod: ,,, | Performed by: FAMILY MEDICINE

## 2017-09-27 PROCEDURE — 99213 OFFICE O/P EST LOW 20 MIN: CPT | Mod: PBBFAC | Performed by: FAMILY MEDICINE

## 2017-09-27 PROCEDURE — 99999 PR PBB SHADOW E&M-EST. PATIENT-LVL III: CPT | Mod: PBBFAC,,, | Performed by: FAMILY MEDICINE

## 2017-09-27 RX ORDER — DEXTROAMPHETAMINE SACCHARATE, AMPHETAMINE ASPARTATE, DEXTROAMPHETAMINE SULFATE AND AMPHETAMINE SULFATE 7.5; 7.5; 7.5; 7.5 MG/1; MG/1; MG/1; MG/1
TABLET ORAL
Qty: 60 TABLET | Refills: 0 | Status: SHIPPED | OUTPATIENT
Start: 2017-10-21 | End: 2017-10-23 | Stop reason: SDUPTHER

## 2017-09-27 RX ORDER — MAGNESIUM 250 MG
TABLET ORAL DAILY
COMMUNITY
End: 2018-06-15

## 2017-09-27 RX ORDER — TRAMADOL HYDROCHLORIDE AND ACETAMINOPHEN 37.5; 325 MG/1; MG/1
TABLET, FILM COATED ORAL
Qty: 30 TABLET | Refills: 5 | Status: CANCELLED | OUTPATIENT
Start: 2017-09-27

## 2017-09-27 RX ORDER — ALLOPURINOL 300 MG/1
300 TABLET ORAL DAILY
Qty: 30 TABLET | Refills: 5 | Status: SHIPPED | OUTPATIENT
Start: 2017-09-27 | End: 2018-03-21 | Stop reason: SDUPTHER

## 2017-09-27 RX ORDER — ALPRAZOLAM 0.5 MG/1
0.5 TABLET ORAL 2 TIMES DAILY PRN
Qty: 60 TABLET | Refills: 5 | Status: SHIPPED | OUTPATIENT
Start: 2017-09-27 | End: 2017-12-20 | Stop reason: SDUPTHER

## 2017-09-27 RX ORDER — ALLOPURINOL 300 MG/1
300 TABLET ORAL DAILY
COMMUNITY
End: 2017-09-27 | Stop reason: SDUPTHER

## 2017-09-27 NOTE — PROGRESS NOTES
Pt is a 52 y.o. female who presents for check up for   Encounter Diagnoses   Name Primary?    GIOVANA (generalized anxiety disorder)     Attention deficit hyperactivity disorder (ADHD), unspecified ADHD type     Sciatica, unspecified laterality     Lumbar spinal stenosis     Lumbar facet arthropathy     Idiopathic chronic gout of right foot without tophus Yes   . Doing well on current meds. Denies any side effects. Prevention is up to date.    History of present illness:   Sylvain Robison is a 52 y.o. female here for a checkup for her elevated blood pressure.  She was started on chlorthalidone 25 mg every morning.  She tolerates it well.  Her blood pressure is better.  She is losing weight.    She recently went to the emergency room for severe right foot pain.  She was diagnosed with acute gout.  She's taking Toradol, colchicine, allopurinol.  Her foot pain has improved.    She is doing very well on Adderall.  She is not having trouble sleeping. She is Handling stress at home very well.  She is not having side effects from the medication such as palpitations, anxiety attacks.  She is able to focus and stay on track and get projects finished.  Her  is being treated for lymphoma recurrence and is scheduled for bone marrow transplant.  There is a lot of stress at home.     Complains of neck pain with pain and numbness radiating into her right shoulder.  Comes and goes.  Gabapentin and Anaprox helps.    X-ray was reviewed.  She has facet joint hypertrophy and loss of disc space at C5-6.  She also takes gabapentin which helps but it makes her sleepy.  She takes it twice daily.  Having sciatica pain in left leg.  Tried neurontin which helped.  MRI shows lumbar facet arthropathy, mild lumbar spinal stenosis.  Gabapentin has been very effective.  She is not taking her Celebrex.  She gets a lot of pain in her left hip and leg at night.  Tramadol usually helps but not lately.  She is seeing ortho, Dr. Sweet.  She  received epidural sterile injections which helped.    Review of systems:  Gen.: No weight loss  HEENT: No headaches, sinus congestion, change in vision  Cardiovascular: No chest pain, palpitations  Respiratory: No cough, shortness of breath  Neurological: Sleeping well, no weakness, no syncope, normal memory, no seizure, no ticks  MSK:  Leg cramps    Physical exam:   Vitals:    09/27/17 1054   BP: 122/80   Pulse: 76   Resp: 20     Gen.: Well developed, well nourished white female in no apparent distress  HEENT: Pupils equal round reactive to light and accommodation, extraocular muscles intact, TMs are normal translucent mobile, neck is supple no lymphadenopathy no JVD, throat is clear.  Neck exam: Good range of motion.  Mild Spurling sign.  Cracking palpable.  Upper extremity strength equal and strong.  Upper extremity reflexes 2+ bilaterally   Heart:Cardiac exam revealed the PMI to be normally situated and sized. The rhythm was regular and no extrasystoles were noted during several minutes of auscultation. The first and second heart sounds were normal and physiologic splitting of the second heart sound was noted. There were no murmurs, rubs, clicks, or gallops.  Lungs:Lungs were clear to auscultation and percussion, and with normal diaphragmatic excursion. No wheezes or rales were noted.   Neuro: Neurologically, the patient was awake, alert, and oriented to person, place and time. There were no obvious focal neurologic abnormalities.  Extremities: No clubbing cyanosis or edema.  Right foot swollen and tender  Back: Moderate tenderness and spasm the lumbar region, negative straight leg raise, DTRs are 2+ and equal in knees and ankles.  Strength in the legs is 5+ equally.  Straight leg raise did cause lumbar pain and mild pain into the leg.    Lab Results   Component Value Date    LDLCALC 148.8 06/28/2016     BMP  Lab Results   Component Value Date     08/02/2017    K 4.2 08/02/2017     08/02/2017    CO2  31 (H) 08/02/2017    BUN 16 08/02/2017    CREATININE 1.0 08/02/2017    CALCIUM 10.1 08/02/2017    ANIONGAP 9 08/02/2017    ESTGFRAFRICA >60 08/02/2017    EGFRNONAA >60 08/02/2017       Lab Results   Component Value Date    URICACID 6.7 (H) 06/28/2017     Assessment/plan:     Attention deficit hyperactivity disorder - stable on current medication  The current medication will be changed to Adderall  15 minute discussion with the patient regarding the importance of proper sleep hygiene was completed. The patientwill be encouraged to go to bed at the same time and wake up at the same time even on the weekends. The patient was encouraged to continue giving the medication even on weekends and holidays.    Cervical spondylolysis.    Continue mobic and gabapentin.  Consider physical therapy, MRI, pain management if symptoms do not improve.    Lumbar facet arthropathy/moderate lumbar spinal stenosis on MRI/left sciatic pain  Celebrex  Gabapentin  Tramadol prn pain  Keep appt with pain management    Cervical spondylarthritis  Celebrex  Tramadol as needed    HTN (hypertension)  Two gram sodium diet.    Weight loss discussed.    Try to walk 2 miles per day.    Quit smoking.    Current medications will be:  Chlorthalidone (HYGROTEN) 25 MG Tab; Take 1 tablet (25 mg total) by mouth once daily.    Anxiety    Sciatica, unspecified laterality  -     tramadol-acetaminophen 37.5-325 mg (ULTRACET) 37.5-325 mg Tab; TAKE ONE TABLET BY MOUTH EVERY 6 HOURS AS NEEDED PAIN  Dispense: 30 tablet; Refill: 5    Lumbar spinal stenosis  -     tramadol-acetaminophen 37.5-325 mg (ULTRACET) 37.5-325 mg Tab; TAKE ONE TABLET BY MOUTH EVERY 6 HOURS AS NEEDED PAIN  Dispense: 30 tablet; Refill: 5    Lumbar facet arthropathy  -     tramadol-acetaminophen 37.5-325 mg (ULTRACET) 37.5-325 mg Tab; TAKE ONE TABLET BY MOUTH EVERY 6 HOURS AS NEEDED PAIN  Dispense: 30 tablet; Refill: 5    Hypokalemia  -     potassium chloride SA (K-DUR,KLOR-CON) 20 MEQ tablet; Take  1 tablet (20 mEq total) by mouth 2 (two) times daily.  Dispense: 60 tablet; Refill: 2  -     Basic metabolic panel; Future    Attention deficit hyperactivity disorder (ADHD), unspecified ADHD type  -     dextroamphetamine-amphetamine (ADDERALL) 30 mg Tab; 1 po q am, noon  Dispense: 60 tablet; Refill: 0  -     dextroamphetamine-amphetamine (ADDERALL) 30 mg Tab; 1 po q am, noon  Dispense: 60 tablet; Refill: 0    Benign essential HTN  -     chlorthalidone (HYGROTEN) 25 MG Tab; Take 1 tablet (25 mg total) by mouth once daily.  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  Dispense: 30 tablet; Refill: 0  -     colchicine (COLCRYS) 0.6 mg tablet; Take 1 tablet (0.6 mg total) by mouth once daily.  Dispense: 30 tablet; Refill: 5      The next appointment will be every 3 months.  I'll check a uric acid level, BMP at that time.

## 2017-10-22 ENCOUNTER — HOSPITAL ENCOUNTER (EMERGENCY)
Facility: HOSPITAL | Age: 52
Discharge: HOME | End: 2017-10-22
Attending: SURGERY
Payer: MEDICAID

## 2017-10-22 VITALS
RESPIRATION RATE: 16 BRPM | DIASTOLIC BLOOD PRESSURE: 74 MMHG | BODY MASS INDEX: 30.18 KG/M2 | TEMPERATURE: 97 F | WEIGHT: 165 LBS | SYSTOLIC BLOOD PRESSURE: 124 MMHG | HEART RATE: 78 BPM

## 2017-10-22 DIAGNOSIS — M54.9 ACUTE MIDLINE BACK PAIN, UNSPECIFIED BACK LOCATION: Primary | ICD-10-CM

## 2017-10-22 LAB
ALBUMIN SERPL BCP-MCNC: 3.2 G/DL
ALP SERPL-CCNC: 101 U/L
ALT SERPL W/O P-5'-P-CCNC: 13 U/L
ANION GAP SERPL CALC-SCNC: 11 MMOL/L
AST SERPL-CCNC: 19 U/L
BACTERIA #/AREA URNS HPF: ABNORMAL /HPF
BASOPHILS # BLD AUTO: 0.04 K/UL
BASOPHILS NFR BLD: 0.3 %
BILIRUB SERPL-MCNC: 0.6 MG/DL
BILIRUB UR QL STRIP: NEGATIVE
BUN SERPL-MCNC: 11 MG/DL
CALCIUM SERPL-MCNC: 9.8 MG/DL
CHLORIDE SERPL-SCNC: 96 MMOL/L
CLARITY UR: CLEAR
CO2 SERPL-SCNC: 28 MMOL/L
COLOR UR: YELLOW
CREAT SERPL-MCNC: 0.8 MG/DL
DIFFERENTIAL METHOD: ABNORMAL
EOSINOPHIL # BLD AUTO: 0.2 K/UL
EOSINOPHIL NFR BLD: 1.2 %
ERYTHROCYTE [DISTWIDTH] IN BLOOD BY AUTOMATED COUNT: 14.5 %
EST. GFR  (AFRICAN AMERICAN): >60 ML/MIN/1.73 M^2
EST. GFR  (NON AFRICAN AMERICAN): >60 ML/MIN/1.73 M^2
GLUCOSE SERPL-MCNC: 106 MG/DL
GLUCOSE UR QL STRIP: NEGATIVE
HCT VFR BLD AUTO: 41.1 %
HGB BLD-MCNC: 13.6 G/DL
HGB UR QL STRIP: ABNORMAL
KETONES UR QL STRIP: NEGATIVE
LEUKOCYTE ESTERASE UR QL STRIP: NEGATIVE
LYMPHOCYTES # BLD AUTO: 1.8 K/UL
LYMPHOCYTES NFR BLD: 13.4 %
MCH RBC QN AUTO: 29.3 PG
MCHC RBC AUTO-ENTMCNC: 33.1 G/DL
MCV RBC AUTO: 89 FL
MICROSCOPIC COMMENT: ABNORMAL
MONOCYTES # BLD AUTO: 1.3 K/UL
MONOCYTES NFR BLD: 9.1 %
NEUTROPHILS # BLD AUTO: 10.4 K/UL
NEUTROPHILS NFR BLD: 76 %
NITRITE UR QL STRIP: NEGATIVE
PH UR STRIP: 7 [PH] (ref 5–8)
PLATELET # BLD AUTO: 319 K/UL
PMV BLD AUTO: 10.1 FL
POTASSIUM SERPL-SCNC: 2.9 MMOL/L
PROT SERPL-MCNC: 7.5 G/DL
PROT UR QL STRIP: NEGATIVE
RBC # BLD AUTO: 4.64 M/UL
RBC #/AREA URNS HPF: 5 /HPF (ref 0–4)
SODIUM SERPL-SCNC: 135 MMOL/L
SP GR UR STRIP: 1.01 (ref 1–1.03)
SQUAMOUS #/AREA URNS HPF: 10 /HPF
URN SPEC COLLECT METH UR: ABNORMAL
UROBILINOGEN UR STRIP-ACNC: NEGATIVE EU/DL
WBC # BLD AUTO: 13.7 K/UL
WBC #/AREA URNS HPF: 2 /HPF (ref 0–5)

## 2017-10-22 PROCEDURE — 85025 COMPLETE CBC W/AUTO DIFF WBC: CPT

## 2017-10-22 PROCEDURE — 36415 COLL VENOUS BLD VENIPUNCTURE: CPT

## 2017-10-22 PROCEDURE — 81000 URINALYSIS NONAUTO W/SCOPE: CPT

## 2017-10-22 PROCEDURE — 99284 EMERGENCY DEPT VISIT MOD MDM: CPT

## 2017-10-22 PROCEDURE — 80053 COMPREHEN METABOLIC PANEL: CPT

## 2017-10-22 NOTE — ED PROVIDER NOTES
Encounter Date: 10/22/2017       History     Chief Complaint    Back Pain     HPI   Sylvain Robisno is a 52 y.o. female presents with right flank pain for last 3 days  States she was helping her mother lift and move things with right flank pain after  Patient states that she has a history of back pain but this flank pain was different   Patient has a normal lumbar spine without any step-off on ER evaluation today  Patient denies any hematuria or dysuria, no flank ER examination this evening  Patient has no history of kidney stones, afebrile with stable vital signs now    The history is provided by the patient  Medical history: ADHD, anxiety, carpal tunnel, cervical cancer  Surgical history: D&C, hysterectomy, carpal tunnel, vocal CORD lateralization  ALLERGIES: Codeine  Social history: , smoker, unemployed, with children  Family history: HTN, COPD, cancer     Review of Systems and Physical Exam      Review of Systems   -- Constitution - no fever, denies fatigue, no weakness, stable weight  -- Eyes - no tearing or redness, no change in vision, no double vision  -- Ear, Nose - no tinnitus or earache, no nasal congestion or discharge  -- Mouth,Throat - no sore throat, no toothache, denies dysphagia, normal swallowing  -- Respiratory - denies cough and sputum, no shortness of breath, no OROURKE, no wheeze  -- Cardiovascular - denies chest pain, no palpitations, denies claudication. No orthopnea   -- Gastrointestinal - denies abdominal pain, nausea, vomiting, diarrhea, or constipation.   -- Genitourinary - right flank pain and no dysuria, also denies frequency or urgency  -- Musculoskeletal - denies muscle or joint pain, back pain  -- Skin - denies rash or changes in skin, no hives or welts  -- Neurological - no headache, denies weakness or seizure; no loss of consciousness    BP Pulse Resp Temp SpO2   10/22/17 1635 10/22/17 1635 10/22/17 1634 10/22/17 1634 --   110/70 102 18 98.5 °F (36.9 °C)      Physical Exam    · Nursing note and vitals reviewed.  · Constitutional: Appears well-developed and well-nourished.  · Head: Atraumatic. Normocephalic. No obvious abnormality  · Eyes: EOMI bilaterally. Pupils are equal and reactive to light. Normal conjunctiva and lids  · Cardiac: Normal rate, regular rhythm and normal heart sounds. Normal S1 and S2  · Pulmonary: normal respiratory effort, breath sounds clear to auscultation. No rhonchi or rales  · Abdominal: Soft & flat, no tenderness. Normal bowel sounds. Normal liver and spleen, no hernia  · Genitourinary: no flank pain on exam, no suprapubic pain by palpation   · Musculoskeletal: Normal range of motion, no effusions. Joints stable with no asymmetry.  · Neurological: A&O x 3. No focal deficits. CN II-XII grossly intact. 5/5 strength major flexors/extensors  · Skin: Skin is warm and dry. No evidence of rash or cellulitis. No abnormalities palpated    ED Course   Procedures  Labs Reviewed   URINALYSIS - Abnormal; Notable for the following:        Result Value    Occult Blood UA 1+ (*)     All other components within normal limits   URINALYSIS MICROSCOPIC - Abnormal; Notable for the following:     RBC, UA 5 (*)     Bacteria, UA Moderate (*)     All other components within normal limits   CBC W/ AUTO DIFFERENTIAL - Abnormal; Notable for the following:     WBC 13.70 (*)     Gran # 10.4 (*)     Mono # 1.3 (*)     Gran% 76.0 (*)     Lymph% 13.4 (*)     All other components within normal limits   COMPREHENSIVE METABOLIC PANEL        Change of Shift  -- This patient was taken over by Dr. Acuna at shift change  -- Workup is pending, oncoming physician will follow-up and give disposition  -- Pt is completely stable and appropriate handoff was given by the outgoing MD   -- Jd Nguyễn M.D. 6:09 PM 10/22/2017                         ED Course      Clinical Impression:   The encounter diagnosis was Acute midline back pain, unspecified back location.                           Woodrow GORE  MD Armand  10/22/17 3701

## 2017-10-23 DIAGNOSIS — F90.9 ATTENTION DEFICIT HYPERACTIVITY DISORDER (ADHD), UNSPECIFIED ADHD TYPE: ICD-10-CM

## 2017-10-23 RX ORDER — DEXTROAMPHETAMINE SACCHARATE, AMPHETAMINE ASPARTATE, DEXTROAMPHETAMINE SULFATE AND AMPHETAMINE SULFATE 7.5; 7.5; 7.5; 7.5 MG/1; MG/1; MG/1; MG/1
TABLET ORAL
Qty: 60 TABLET | Refills: 0 | Status: SHIPPED | OUTPATIENT
Start: 2017-10-23 | End: 2017-11-22 | Stop reason: SDUPTHER

## 2017-10-23 NOTE — PROVIDER PROGRESS NOTES - EMERGENCY DEPT.
Encounter Date: 10/22/2017    ED Physician Progress Notes           Laboratory results and CAT scan discussed with the patient.  She reports the pain in her back is primarily when she moves not at rest.  She does report when up and down the ladder 3 days ago frequently.  Has a history of spinal stenosis which takes gabapentin and tramadol.  She was used when necessary nonsteroidals.  No dysuria urgency or frequency.

## 2017-11-03 ENCOUNTER — TELEPHONE (OUTPATIENT)
Dept: OBSTETRICS AND GYNECOLOGY | Facility: CLINIC | Age: 52
End: 2017-11-03

## 2017-11-03 DIAGNOSIS — Z12.31 ENCOUNTER FOR SCREENING MAMMOGRAM FOR BREAST CANCER: Primary | ICD-10-CM

## 2017-11-03 NOTE — TELEPHONE ENCOUNTER
----- Message from Caroline Soares MA sent at 11/3/2017  3:10 PM CDT -----  Contact: juan Robison  MRN: [id number]  Home Phone      [phone]  Work Phone      Not on file.  Mobile          [phone]    Patient Care Team:  Diego Day MD as PCP - General (Family Medicine)  Heraclio Tierney MD as Obstetrician (Obstetrics)  OB? No  What phone number can you be reached at?  [phone]  Message:   Please link mammo orders to appt 11/17/17.  Thanks!

## 2017-11-17 ENCOUNTER — HOSPITAL ENCOUNTER (OUTPATIENT)
Dept: RADIOLOGY | Facility: HOSPITAL | Age: 52
Discharge: HOME | End: 2017-11-17
Attending: OBSTETRICS & GYNECOLOGY
Payer: MEDICAID

## 2017-11-17 VITALS — BODY MASS INDEX: 30.36 KG/M2 | WEIGHT: 165 LBS | HEIGHT: 62 IN

## 2017-11-17 DIAGNOSIS — Z12.31 ENCOUNTER FOR SCREENING MAMMOGRAM FOR BREAST CANCER: ICD-10-CM

## 2017-11-17 PROCEDURE — 77063 BREAST TOMOSYNTHESIS BI: CPT | Mod: 26,,, | Performed by: RADIOLOGY

## 2017-11-17 PROCEDURE — 77067 SCR MAMMO BI INCL CAD: CPT | Mod: TC

## 2017-11-17 PROCEDURE — 77067 SCR MAMMO BI INCL CAD: CPT | Mod: 26,,, | Performed by: RADIOLOGY

## 2017-11-22 DIAGNOSIS — F90.9 ATTENTION DEFICIT HYPERACTIVITY DISORDER (ADHD), UNSPECIFIED ADHD TYPE: ICD-10-CM

## 2017-11-24 RX ORDER — DEXTROAMPHETAMINE SACCHARATE, AMPHETAMINE ASPARTATE, DEXTROAMPHETAMINE SULFATE AND AMPHETAMINE SULFATE 7.5; 7.5; 7.5; 7.5 MG/1; MG/1; MG/1; MG/1
TABLET ORAL
Qty: 60 TABLET | Refills: 0 | Status: SHIPPED | OUTPATIENT
Start: 2017-11-24 | End: 2017-12-20 | Stop reason: SDUPTHER

## 2017-12-20 ENCOUNTER — OFFICE VISIT (OUTPATIENT)
Dept: FAMILY MEDICINE | Facility: CLINIC | Age: 52
End: 2017-12-20
Payer: MEDICAID

## 2017-12-20 VITALS
SYSTOLIC BLOOD PRESSURE: 124 MMHG | RESPIRATION RATE: 20 BRPM | HEIGHT: 62 IN | WEIGHT: 166.38 LBS | BODY MASS INDEX: 30.62 KG/M2 | HEART RATE: 68 BPM | DIASTOLIC BLOOD PRESSURE: 78 MMHG

## 2017-12-20 DIAGNOSIS — M54.30 SCIATICA, UNSPECIFIED LATERALITY: ICD-10-CM

## 2017-12-20 DIAGNOSIS — M48.062 SPINAL STENOSIS OF LUMBAR REGION WITH NEUROGENIC CLAUDICATION: Primary | ICD-10-CM

## 2017-12-20 DIAGNOSIS — I10 BENIGN ESSENTIAL HTN: ICD-10-CM

## 2017-12-20 DIAGNOSIS — F41.1 GAD (GENERALIZED ANXIETY DISORDER): ICD-10-CM

## 2017-12-20 DIAGNOSIS — M1A.0710 IDIOPATHIC CHRONIC GOUT OF RIGHT FOOT WITHOUT TOPHUS: ICD-10-CM

## 2017-12-20 DIAGNOSIS — F90.9 ATTENTION DEFICIT HYPERACTIVITY DISORDER (ADHD), UNSPECIFIED ADHD TYPE: ICD-10-CM

## 2017-12-20 DIAGNOSIS — M47.816 LUMBAR FACET ARTHROPATHY: ICD-10-CM

## 2017-12-20 DIAGNOSIS — K21.00 GASTROESOPHAGEAL REFLUX DISEASE WITH ESOPHAGITIS: ICD-10-CM

## 2017-12-20 LAB
ANION GAP SERPL CALC-SCNC: 12 MMOL/L
BUN SERPL-MCNC: 20 MG/DL
CALCIUM SERPL-MCNC: 10 MG/DL
CHLORIDE SERPL-SCNC: 102 MMOL/L
CO2 SERPL-SCNC: 26 MMOL/L
CREAT SERPL-MCNC: 0.8 MG/DL
EST. GFR  (AFRICAN AMERICAN): >60 ML/MIN/1.73 M^2
EST. GFR  (NON AFRICAN AMERICAN): >60 ML/MIN/1.73 M^2
GLUCOSE SERPL-MCNC: 73 MG/DL
POTASSIUM SERPL-SCNC: 3.2 MMOL/L
SODIUM SERPL-SCNC: 140 MMOL/L
URATE SERPL-MCNC: 3.8 MG/DL

## 2017-12-20 PROCEDURE — 80048 BASIC METABOLIC PNL TOTAL CA: CPT

## 2017-12-20 PROCEDURE — 36415 COLL VENOUS BLD VENIPUNCTURE: CPT | Mod: PBBFAC | Performed by: FAMILY MEDICINE

## 2017-12-20 PROCEDURE — 84550 ASSAY OF BLOOD/URIC ACID: CPT

## 2017-12-20 PROCEDURE — 99214 OFFICE O/P EST MOD 30 MIN: CPT | Mod: PBBFAC | Performed by: FAMILY MEDICINE

## 2017-12-20 PROCEDURE — 99999 PR PBB SHADOW E&M-EST. PATIENT-LVL IV: CPT | Mod: PBBFAC,,, | Performed by: FAMILY MEDICINE

## 2017-12-20 PROCEDURE — 99214 OFFICE O/P EST MOD 30 MIN: CPT | Mod: S$PBB,,, | Performed by: FAMILY MEDICINE

## 2017-12-20 RX ORDER — TRAMADOL HYDROCHLORIDE AND ACETAMINOPHEN 37.5; 325 MG/1; MG/1
TABLET, FILM COATED ORAL
Qty: 30 TABLET | Refills: 5 | Status: SHIPPED | OUTPATIENT
Start: 2017-12-20 | End: 2018-03-21 | Stop reason: SDUPTHER

## 2017-12-20 RX ORDER — PANTOPRAZOLE SODIUM 40 MG/1
40 TABLET, DELAYED RELEASE ORAL DAILY
Qty: 30 TABLET | Refills: 5 | Status: SHIPPED | OUTPATIENT
Start: 2017-12-20 | End: 2018-03-21 | Stop reason: SDUPTHER

## 2017-12-20 RX ORDER — DEXTROAMPHETAMINE SACCHARATE, AMPHETAMINE ASPARTATE, DEXTROAMPHETAMINE SULFATE AND AMPHETAMINE SULFATE 7.5; 7.5; 7.5; 7.5 MG/1; MG/1; MG/1; MG/1
30 TABLET ORAL 2 TIMES DAILY
Qty: 60 TABLET | Refills: 0 | Status: SHIPPED | OUTPATIENT
Start: 2017-12-20 | End: 2018-01-22 | Stop reason: SDUPTHER

## 2017-12-20 RX ORDER — CHLORTHALIDONE 25 MG/1
25 TABLET ORAL DAILY
Qty: 30 TABLET | Refills: 5 | Status: SHIPPED | OUTPATIENT
Start: 2017-12-20 | End: 2018-03-21 | Stop reason: SDUPTHER

## 2017-12-20 RX ORDER — GABAPENTIN 300 MG/1
300 CAPSULE ORAL 2 TIMES DAILY
Qty: 60 CAPSULE | Refills: 5 | Status: SHIPPED | OUTPATIENT
Start: 2017-12-20 | End: 2018-03-21 | Stop reason: SDUPTHER

## 2017-12-20 RX ORDER — ALPRAZOLAM 0.5 MG/1
0.5 TABLET ORAL 2 TIMES DAILY PRN
Qty: 60 TABLET | Refills: 5 | Status: SHIPPED | OUTPATIENT
Start: 2017-12-20 | End: 2018-03-21 | Stop reason: SDUPTHER

## 2017-12-20 NOTE — PROGRESS NOTES
Pt is a 52 y.o. female who presents for check up for   Encounter Diagnoses   Name Primary?    Attention deficit hyperactivity disorder (ADHD), unspecified ADHD type     Spinal stenosis of lumbar region with neurogenic claudication Yes    Gastroesophageal reflux disease with esophagitis     GIOVANA (generalized anxiety disorder)     Sciatica, unspecified laterality     Lumbar facet arthropathy     Benign essential HTN     Idiopathic chronic gout of right foot without tophus    . Doing well on current meds. Denies any side effects. Prevention is up to date.    History of present illness:   Sylvain Robison is a 52 y.o. female here for a checkup for her elevated blood pressure.  She was started on chlorthalidone 25 mg every morning.  She tolerates it well.  Her blood pressure is better.  She is losing weight.  She recently went to the emergency room for severe right foot pain.  She was diagnosed with acute gout.  She's taking colchicine, allopurinol.  Her foot pain has improved.  She is doing very well on Adderall.  She is not having trouble sleeping. She is Handling stress at home very well.  She is not having side effects from the medication such as palpitations, anxiety attacks.  She is able to focus and stay on track and get projects finished.  Her  is being treated for lymphoma recurrence and is scheduled for bone marrow transplant.  There is a lot of stress at home.   Complains of neck pain with pain and numbness radiating into her right shoulder.  Comes and goes.  Gabapentin and Anaprox helps.    X-ray was reviewed.  She has facet joint hypertrophy and loss of disc space at C5-6.  She also takes gabapentin which helps but it makes her sleepy.  She takes it twice daily.  Having sciatica pain in left leg.  Tried neurontin which helped.  MRI shows lumbar facet arthropathy, mild lumbar spinal stenosis.  Gabapentin has been very effective.  She is not taking her Celebrex.  She gets a lot of pain in her left  hip and leg at night.  Tramadol usually helps but not lately.  She received epidural sterile injections which helped a little.  She states the pain is back and she would like to pursue surgical options..    Review of systems:  Gen.: No weight loss  HEENT: No headaches, sinus congestion, change in vision  Cardiovascular: No chest pain, palpitations  Respiratory: No cough, shortness of breath  Neurological: Sleeping well, no weakness, no syncope, normal memory, no seizure, no ticks  MSK:  Leg cramps    Physical exam:   Vitals:    12/20/17 1106   BP: 124/78   Pulse: 68   Resp: 20     Gen.: Well developed, well nourished white female in no apparent distress  HEENT: Pupils equal round reactive to light and accommodation, extraocular muscles intact, TMs are normal translucent mobile, neck is supple no lymphadenopathy no JVD, throat is clear.  Neck exam: Good range of motion.  Mild Spurling sign.  Cracking palpable.  Upper extremity strength equal and strong.  Upper extremity reflexes 2+ bilaterally   Heart:Cardiac exam revealed the PMI to be normally situated and sized. The rhythm was regular and no extrasystoles were noted during several minutes of auscultation. The first and second heart sounds were normal and physiologic splitting of the second heart sound was noted. There were no murmurs, rubs, clicks, or gallops.  Lungs:Lungs were clear to auscultation and percussion, and with normal diaphragmatic excursion. No wheezes or rales were noted.   Neuro: Neurologically, the patient was awake, alert, and oriented to person, place and time. There were no obvious focal neurologic abnormalities.  Extremities: No clubbing cyanosis or edema.  Right foot swollen and tender  Back: Moderate tenderness and spasm the lumbar region, negative straight leg raise, DTRs are 2+ and equal in knees and ankles.  Strength in the legs is 5+ equally.  Straight leg raise did cause lumbar pain and mild pain into the leg.    Lab Results   Component  Value Date    LDLCALC 148.8 06/28/2016     BMP  Lab Results   Component Value Date     (L) 10/22/2017    K 2.9 (L) 10/22/2017    CL 96 10/22/2017    CO2 28 10/22/2017    BUN 11 10/22/2017    CREATININE 0.8 10/22/2017    CALCIUM 9.8 10/22/2017    ANIONGAP 11 10/22/2017    ESTGFRAFRICA >60 10/22/2017    EGFRNONAA >60 10/22/2017       Lab Results   Component Value Date    URICACID 6.7 (H) 06/28/2017     Assessment/plan:   Spinal stenosis of lumbar region with neurogenic claudication  -     Ambulatory referral to Neurosurgery  -     tramadol-acetaminophen 37.5-325 mg (ULTRACET) 37.5-325 mg Tab; TAKE ONE TABLET BY MOUTH EVERY 6 HOURS AS NEEDED PAIN  Dispense: 30 tablet; Refill: 5  -     gabapentin (NEURONTIN) 300 MG capsule; Take 1 capsule (300 mg total) by mouth 2 (two) times daily.  Dispense: 60 capsule; Refill: 5    Attention deficit hyperactivity disorder (ADHD), unspecified ADHD type  -     dextroamphetamine-amphetamine 30 mg Tab; Take 30 mg by mouth 2 (two) times daily.  Dispense: 60 tablet; Refill: 0    Gastroesophageal reflux disease with esophagitis  -     pantoprazole (PROTONIX) 40 MG tablet; Take 1 tablet (40 mg total) by mouth once daily.  Dispense: 30 tablet; Refill: 5    GIOVANA (generalized anxiety disorder)  -     ALPRAZolam (XANAX) 0.5 MG tablet; Take 1 tablet (0.5 mg total) by mouth 2 (two) times daily as needed.  Dispense: 60 tablet; Refill: 5    Sciatica, unspecified laterality  -     tramadol-acetaminophen 37.5-325 mg (ULTRACET) 37.5-325 mg Tab; TAKE ONE TABLET BY MOUTH EVERY 6 HOURS AS NEEDED PAIN  Dispense: 30 tablet; Refill: 5    Lumbar facet arthropathy  -     tramadol-acetaminophen 37.5-325 mg (ULTRACET) 37.5-325 mg Tab; TAKE ONE TABLET BY MOUTH EVERY 6 HOURS AS NEEDED PAIN  Dispense: 30 tablet; Refill: 5    Benign essential HTN  -     chlorthalidone (HYGROTEN) 25 MG Tab; Take 1 tablet (25 mg total) by mouth once daily.  Dispense: 30 tablet; Refill: 5  -     Basic metabolic panel;  Future    Idiopathic chronic gout of right foot without tophus  -     Uric acid; Future      Attention deficit hyperactivity disorder - stable on current medication  The current medication will be changed to Adderall  15 minute discussion with the patient regarding the importance of proper sleep hygiene was completed. The patientwill be encouraged to go to bed at the same time and wake up at the same time even on the weekends. The patient was encouraged to continue giving the medication even on weekends and holidays.    Cervical spondylolysis.    Continue mobic and gabapentin.     Lumbar facet arthropathy/moderate lumbar spinal stenosis on MRI/left sciatic pain  Celebrex  Gabapentin  Tramadol prn pain  Ref to Neurosurgery clinic [facility] due to medicaid    Cervical spondylarthritis  Celebrex  Tramadol as needed    HTN (hypertension)  Two gram sodium diet.    Weight loss discussed.    Try to walk 2 miles per day.    Quit smoking.    Current medications will be:  Chlorthalidone (HYGROTEN) 25 MG Tab; Take 1 tablet (25 mg total) by mouth once daily.    Hypokalemia  -     potassium chloride SA (K-DUR,KLOR-CON) 20 MEQ tablet; Take 1 tablet (20 mEq total) by mouth 2 (two) times daily.  Dispense: 60 tablet; Refill: 2    Attention deficit hyperactivity disorder (ADHD), unspecified ADHD type  -     dextroamphetamine-amphetamine (ADDERALL) 30 mg Tab; 1 po q am, noon  Dispense: 60 tablet; Refill: 0    Benign essential HTN  -     chlorthalidone (HYGROTEN) 25 MG Tab; Take 1 tablet (25 mg total) by mouth once daily.  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  Dispense: 30 tablet; Refill: 0  -     colchicine (COLCRYS) 0.6 mg tablet; Take 1 tablet (0.6 mg total) by mouth once daily.  Dispense: 30 tablet; Refill: 5      The next appointment will be every 3 months.

## 2018-01-22 DIAGNOSIS — F90.9 ATTENTION DEFICIT HYPERACTIVITY DISORDER (ADHD), UNSPECIFIED ADHD TYPE: ICD-10-CM

## 2018-01-22 RX ORDER — DEXTROAMPHETAMINE SACCHARATE, AMPHETAMINE ASPARTATE, DEXTROAMPHETAMINE SULFATE AND AMPHETAMINE SULFATE 7.5; 7.5; 7.5; 7.5 MG/1; MG/1; MG/1; MG/1
TABLET ORAL
Qty: 60 TABLET | Refills: 0 | Status: SHIPPED | OUTPATIENT
Start: 2018-01-22 | End: 2018-02-23 | Stop reason: SDUPTHER

## 2018-02-23 DIAGNOSIS — F90.9 ATTENTION DEFICIT HYPERACTIVITY DISORDER (ADHD), UNSPECIFIED ADHD TYPE: ICD-10-CM

## 2018-02-23 RX ORDER — DEXTROAMPHETAMINE SACCHARATE, AMPHETAMINE ASPARTATE, DEXTROAMPHETAMINE SULFATE AND AMPHETAMINE SULFATE 7.5; 7.5; 7.5; 7.5 MG/1; MG/1; MG/1; MG/1
TABLET ORAL
Qty: 60 TABLET | Refills: 0 | Status: SHIPPED | OUTPATIENT
Start: 2018-02-23 | End: 2018-03-21 | Stop reason: SDUPTHER

## 2018-02-26 ENCOUNTER — TELEPHONE (OUTPATIENT)
Dept: OBSTETRICS AND GYNECOLOGY | Facility: CLINIC | Age: 53
End: 2018-02-26

## 2018-02-26 ENCOUNTER — OFFICE VISIT (OUTPATIENT)
Dept: OBSTETRICS AND GYNECOLOGY | Facility: CLINIC | Age: 53
End: 2018-02-26
Payer: MEDICAID

## 2018-02-26 VITALS
HEIGHT: 63 IN | DIASTOLIC BLOOD PRESSURE: 90 MMHG | RESPIRATION RATE: 17 BRPM | SYSTOLIC BLOOD PRESSURE: 140 MMHG | HEART RATE: 78 BPM | WEIGHT: 167 LBS | BODY MASS INDEX: 29.59 KG/M2

## 2018-02-26 DIAGNOSIS — M48.062 SPINAL STENOSIS OF LUMBAR REGION WITH NEUROGENIC CLAUDICATION: ICD-10-CM

## 2018-02-26 DIAGNOSIS — Z01.419 WELL WOMAN EXAM WITH ROUTINE GYNECOLOGICAL EXAM: Primary | ICD-10-CM

## 2018-02-26 DIAGNOSIS — N95.1 MENOPAUSAL STATE: ICD-10-CM

## 2018-02-26 PROCEDURE — 99396 PREV VISIT EST AGE 40-64: CPT | Mod: S$PBB,,, | Performed by: OBSTETRICS & GYNECOLOGY

## 2018-02-26 PROCEDURE — 99213 OFFICE O/P EST LOW 20 MIN: CPT | Mod: PBBFAC | Performed by: OBSTETRICS & GYNECOLOGY

## 2018-02-26 PROCEDURE — 99999 PR PBB SHADOW E&M-EST. PATIENT-LVL III: CPT | Mod: PBBFAC,,, | Performed by: OBSTETRICS & GYNECOLOGY

## 2018-02-26 NOTE — PROGRESS NOTES
Subjective:    Patient ID: Sylvain Robison is a 53 y.o. female.     Chief Complaint: Annual Well Woman Exam     History of Present Illness:  Sylvain presents today for Annual Well Woman exam. .Patient's last menstrual period was 1990.. She is currently using no hormone replacement therapy and she reports no problems with hot flashes, night sweats, irritability and vaginal dryness. She denies breast tenderness, masses, nipple discharge.  She reports no problems with urination. Bowel movements have not significantly changed. She has had problems with sinal stenosis and is having leg pain and numbness. She has been through PT and injections without relief.    Menstrual History:   Patient's last menstrual period was 1990.    OB History      Para Term  AB Living    3 2 2   1 2    SAB TAB Ectopic Multiple Live Births    1       2          Review of Systems   Constitutional: Negative for activity change, appetite change, chills, diaphoresis, fatigue, fever and unexpected weight change.   HENT: Negative for mouth sores and tinnitus.    Eyes: Negative for discharge and visual disturbance.   Respiratory: Negative for cough, shortness of breath and wheezing.    Cardiovascular: Negative for chest pain, palpitations and leg swelling.   Gastrointestinal: Negative for abdominal pain, blood in stool, constipation, diarrhea, nausea and vomiting.   Endocrine: Negative for diabetes, hair loss, hot flashes, hyperthyroidism and hypothyroidism.   Genitourinary: Negative for decreased libido, dyspareunia, dysuria, flank pain, frequency, genital sores, hematuria, menorrhagia, menstrual problem, pelvic pain, urgency, vaginal bleeding, vaginal discharge, vaginal pain, urinary incontinence, postcoital bleeding and vaginal odor.   Musculoskeletal: Positive for back pain and joint swelling. Negative for myalgias.   Skin:  Negative for rash, no acne and hair changes.   Neurological: Positive for numbness. Negative for  seizures, syncope and headaches.   Hematological: Negative for adenopathy. Does not bruise/bleed easily.   Psychiatric/Behavioral: Positive for depression. Negative for sleep disturbance. The patient is not nervous/anxious.    Breast: Negative for breast pain and nipple discharge        Objective:    Vital Signs:  Vitals:    02/26/18 1433   BP: (!) 140/90   Pulse: 78   Resp: 17       Physical Exam:  General:  alert,normal appearing gravid female   Skin:  Skin color, texture, turgor normal. No rashes or lesions   HEENT:  conjunctivae/corneas clear. PERRL.   Neck: supple, trachea midline, no adenopathy or thyromegally   Respiratory:  clear to auscultation bilaterally   Heart:  regular rate and rhythm, S1, S2 normal, no murmur, click, rub or gallop   Breasts:   Nipples are protruding and have no nipple discharge. No palpable masses, erythema, skin changes, tenderness, or adenopathy.   Abdomen:  soft, non-tender. Bowel sounds normal. No masses,  no organomegaly   Pelvis: External genitalia: normal general appearance  Urinary system: urethral meatus normal, bladder nontender  Vaginal: normal mucosa without prolapse or lesions  Cervix: removed surgically  Uterus: removed surgically  Adnexa: removed surgically   Extremities: Normal ROM; no edema, no cyanosis   Neurologial: CN intact. Decreased sensation left anterior lower leg.   Psychiatric: normal mood, speech, dress, and thought processes         Assessment:      1. Well woman exam with routine gynecological exam    2. Menopausal state    3. Spinal stenosis of lumbar region with neurogenic claudication          Plan:      Well woman exam with routine gynecological exam    Menopausal state    Spinal stenosis of lumbar region with neurogenic claudication    Refer neurosurgeon for evaluation.    COUNSELING:  Sylvain was counseled on A.C.O.G. Pap guidelines and recommendations for yearly pelvic exams in addition to recommendations for yearly mammograms and monthly self breast  exams. In addition she was counseled on adequate intake of calcium and vitamin D; to see her PCP for other health maintenance.

## 2018-02-26 NOTE — TELEPHONE ENCOUNTER
Dr benavidez would like you to call [facility] to see if they have a neuro surgeon that takes medicaid.. She has failed physical therapy and injection therapy..If so please schedule her a appt .

## 2018-02-27 NOTE — TELEPHONE ENCOUNTER
C nurse in the Neuro Surgery department states insurance is accepted however, patient must have an MRI ordered by PCP and referral must come from PCP. Patient verbalized understanding with no questions or concerns.

## 2018-03-21 ENCOUNTER — OFFICE VISIT (OUTPATIENT)
Dept: FAMILY MEDICINE | Facility: CLINIC | Age: 53
End: 2018-03-21
Payer: MEDICAID

## 2018-03-21 VITALS
RESPIRATION RATE: 18 BRPM | WEIGHT: 172 LBS | DIASTOLIC BLOOD PRESSURE: 70 MMHG | SYSTOLIC BLOOD PRESSURE: 122 MMHG | HEIGHT: 63 IN | HEART RATE: 76 BPM | BODY MASS INDEX: 30.48 KG/M2

## 2018-03-21 DIAGNOSIS — K21.00 GASTROESOPHAGEAL REFLUX DISEASE WITH ESOPHAGITIS: ICD-10-CM

## 2018-03-21 DIAGNOSIS — M54.50 LOW BACK PAIN, NON-SPECIFIC: ICD-10-CM

## 2018-03-21 DIAGNOSIS — M48.062 SPINAL STENOSIS OF LUMBAR REGION WITH NEUROGENIC CLAUDICATION: ICD-10-CM

## 2018-03-21 DIAGNOSIS — M54.30 SCIATICA, UNSPECIFIED LATERALITY: ICD-10-CM

## 2018-03-21 DIAGNOSIS — F41.1 GAD (GENERALIZED ANXIETY DISORDER): ICD-10-CM

## 2018-03-21 DIAGNOSIS — I10 BENIGN ESSENTIAL HTN: ICD-10-CM

## 2018-03-21 DIAGNOSIS — M1A.0710 IDIOPATHIC CHRONIC GOUT OF RIGHT FOOT WITHOUT TOPHUS: ICD-10-CM

## 2018-03-21 DIAGNOSIS — M41.26 OTHER IDIOPATHIC SCOLIOSIS, LUMBAR REGION: ICD-10-CM

## 2018-03-21 DIAGNOSIS — F90.9 ATTENTION DEFICIT HYPERACTIVITY DISORDER (ADHD), UNSPECIFIED ADHD TYPE: ICD-10-CM

## 2018-03-21 DIAGNOSIS — M54.32 SCIATICA OF LEFT SIDE: Primary | ICD-10-CM

## 2018-03-21 DIAGNOSIS — M47.816 LUMBAR FACET ARTHROPATHY: ICD-10-CM

## 2018-03-21 PROCEDURE — 99213 OFFICE O/P EST LOW 20 MIN: CPT | Mod: PBBFAC | Performed by: FAMILY MEDICINE

## 2018-03-21 PROCEDURE — 99999 PR PBB SHADOW E&M-EST. PATIENT-LVL III: CPT | Mod: PBBFAC,,, | Performed by: FAMILY MEDICINE

## 2018-03-21 PROCEDURE — 99214 OFFICE O/P EST MOD 30 MIN: CPT | Mod: S$PBB,,, | Performed by: FAMILY MEDICINE

## 2018-03-21 RX ORDER — PANTOPRAZOLE SODIUM 40 MG/1
40 TABLET, DELAYED RELEASE ORAL DAILY
Qty: 30 TABLET | Refills: 5 | Status: SHIPPED | OUTPATIENT
Start: 2018-03-21 | End: 2018-06-15

## 2018-03-21 RX ORDER — CHLORTHALIDONE 25 MG/1
25 TABLET ORAL DAILY
Qty: 30 TABLET | Refills: 5 | Status: SHIPPED | OUTPATIENT
Start: 2018-03-21 | End: 2018-07-11 | Stop reason: SDUPTHER

## 2018-03-21 RX ORDER — TRAMADOL HYDROCHLORIDE AND ACETAMINOPHEN 37.5; 325 MG/1; MG/1
TABLET, FILM COATED ORAL
Qty: 30 TABLET | Refills: 5 | Status: SHIPPED | OUTPATIENT
Start: 2018-03-21 | End: 2019-12-13

## 2018-03-21 RX ORDER — DEXTROAMPHETAMINE SACCHARATE, AMPHETAMINE ASPARTATE, DEXTROAMPHETAMINE SULFATE AND AMPHETAMINE SULFATE 7.5; 7.5; 7.5; 7.5 MG/1; MG/1; MG/1; MG/1
TABLET ORAL
Qty: 60 TABLET | Refills: 0 | Status: SHIPPED | OUTPATIENT
Start: 2018-03-23 | End: 2018-04-23 | Stop reason: SDUPTHER

## 2018-03-21 RX ORDER — COLCHICINE 0.6 MG/1
0.6 TABLET ORAL DAILY
Qty: 30 TABLET | Refills: 5 | Status: SHIPPED | OUTPATIENT
Start: 2018-03-21 | End: 2018-06-15

## 2018-03-21 RX ORDER — ALLOPURINOL 300 MG/1
300 TABLET ORAL DAILY
Qty: 30 TABLET | Refills: 5 | Status: SHIPPED | OUTPATIENT
Start: 2018-03-21 | End: 2018-09-14 | Stop reason: SDUPTHER

## 2018-03-21 RX ORDER — GABAPENTIN 300 MG/1
300 CAPSULE ORAL 2 TIMES DAILY
Qty: 60 CAPSULE | Refills: 5 | Status: SHIPPED | OUTPATIENT
Start: 2018-03-21 | End: 2018-06-15

## 2018-03-21 RX ORDER — ALPRAZOLAM 0.5 MG/1
0.5 TABLET ORAL 2 TIMES DAILY PRN
Qty: 60 TABLET | Refills: 5 | Status: SHIPPED | OUTPATIENT
Start: 2018-03-21 | End: 2018-09-14 | Stop reason: SDUPTHER

## 2018-03-21 NOTE — PROGRESS NOTES
Pt is a 53 y.o. female who presents for check up for   Encounter Diagnoses   Name Primary?    Idiopathic chronic gout of right foot without tophus     GIOVANA (generalized anxiety disorder)     Benign essential HTN     Attention deficit hyperactivity disorder (ADHD), unspecified ADHD type     Spinal stenosis of lumbar region with neurogenic claudication     Gastroesophageal reflux disease with esophagitis     Sciatica, unspecified laterality     Lumbar facet arthropathy     Low back pain, non-specific     Sciatica of left side Yes    Other idiopathic scoliosis, lumbar region    . Doing well on current meds. Denies any side effects. Prevention is up to date.    History of present illness:   Sylvain Robison is a 53 y.o. female here for a checkup for her elevated blood pressure.  She was started on chlorthalidone 25 mg every morning.  She tolerates it well.  Her blood pressure is better.  She is losing weight.  She recently went to the emergency room for severe right foot pain.  She was diagnosed with acute gout.  She's taking colchicine, allopurinol.  Her foot pain has improved.  She is doing very well on Adderall.  She is not having trouble sleeping. She is Handling stress at home very well.  She is not having side effects from the medication such as palpitations, anxiety attacks.  She is able to focus and stay on track and get projects finished.  Her  is being treated for lymphoma recurrence and is scheduled for bone marrow transplant.  There is a lot of stress at home.   Complains of neck pain with pain and numbness radiating into her right shoulder.  Comes and goes.  Gabapentin and Anaprox helps.    X-ray was reviewed.  She has facet joint hypertrophy and loss of disc space at C5-6.  She also takes gabapentin which helps but it makes her sleepy.  She takes it twice daily.  Having sciatica pain in left leg.  Tried neurontin which helped.  MRI shows lumbar facet arthropathy, mild lumbar spinal stenosis.   Gabapentin has been very effective.  She is not taking her Celebrex.  She gets a lot of pain in her left hip and leg at night.  Tramadol usually helps but not lately.  She received epidural sterile injections which helped a little.  She states the pain is back and she would like to pursue surgical options.  She will need an updated MRI before neurosurgical evaluation can take place.    Review of systems:  Gen.: No weight loss  HEENT: No headaches, sinus congestion, change in vision  Cardiovascular: No chest pain, palpitations  Respiratory: No cough, shortness of breath  Neurological: Sleeping well, no weakness, no syncope, normal memory, no seizure, no ticks  MSK:  Leg cramps    Physical exam:   Vitals:    03/21/18 1056   BP: 122/70   Pulse: 76   Resp: 18     Gen.: Well developed, well nourished white female in no apparent distress  HEENT: Pupils equal round reactive to light and accommodation, extraocular muscles intact, TMs are normal translucent mobile, neck is supple no lymphadenopathy no JVD, throat is clear.  Neck exam: Good range of motion.  Mild Spurling sign.  Cracking palpable.  Upper extremity strength equal and strong.  Upper extremity reflexes 2+ bilaterally   Heart:Cardiac exam revealed the PMI to be normally situated and sized. The rhythm was regular and no extrasystoles were noted during several minutes of auscultation. The first and second heart sounds were normal and physiologic splitting of the second heart sound was noted. There were no murmurs, rubs, clicks, or gallops.  Lungs:Lungs were clear to auscultation and percussion, and with normal diaphragmatic excursion. No wheezes or rales were noted.   Neuro: Neurologically, the patient was awake, alert, and oriented to person, place and time. There were no obvious focal neurologic abnormalities.  Extremities: No clubbing cyanosis or edema.  Right foot swollen and tender  Back: Moderate tenderness and spasm the lumbar region, negative straight leg  raise, DTRs are 2+ and equal in knees, Absent in ankles.  Strength in the legs is 5+ equally.  Scoliosis is present.  Straight leg raise did cause lumbar pain and pain into left leg.  Subjective numbness in left lower leg.    Lab Results   Component Value Date    LDLCALC 148.8 06/28/2016     BMP  Lab Results   Component Value Date     12/20/2017    K 3.2 (L) 12/20/2017     12/20/2017    CO2 26 12/20/2017    BUN 20 12/20/2017    CREATININE 0.8 12/20/2017    CALCIUM 10.0 12/20/2017    ANIONGAP 12 12/20/2017    ESTGFRAFRICA >60 12/20/2017    EGFRNONAA >60 12/20/2017       Lab Results   Component Value Date    URICACID 3.8 12/20/2017     Assessment/plan:     Sciatica of left side  -     MRI Lumbar Spine Without Contrast; Future    Idiopathic chronic gout of right foot without tophus  -     allopurinol (ZYLOPRIM) 300 MG tablet; Take 1 tablet (300 mg total) by mouth once daily.  Dispense: 30 tablet; Refill: 5  -     colchicine (COLCRYS) 0.6 mg tablet; Take 1 tablet (0.6 mg total) by mouth once daily.  Dispense: 30 tablet; Refill: 5    GIOVANA (generalized anxiety disorder)  -     ALPRAZolam (XANAX) 0.5 MG tablet; Take 1 tablet (0.5 mg total) by mouth 2 (two) times daily as needed.  Dispense: 60 tablet; Refill: 5    Benign essential HTN  -     chlorthalidone (HYGROTEN) 25 MG Tab; Take 1 tablet (25 mg total) by mouth once daily.  Dispense: 30 tablet; Refill: 5    Attention deficit hyperactivity disorder (ADHD), unspecified ADHD type  -     dextroamphetamine-amphetamine 30 mg Tab; TAKE 1 TABLET BY MOUTH THE MORNING AND ONE AT NOON  Dispense: 60 tablet; Refill: 0    Spinal stenosis of lumbar region with neurogenic claudication  -     gabapentin (NEURONTIN) 300 MG capsule; Take 1 capsule (300 mg total) by mouth 2 (two) times daily.  Dispense: 60 capsule; Refill: 5  -     tramadol-acetaminophen 37.5-325 mg (ULTRACET) 37.5-325 mg Tab; TAKE ONE TABLET BY MOUTH EVERY 6 HOURS AS NEEDED PAIN  Dispense: 30 tablet; Refill:  5  -     MRI Lumbar Spine Without Contrast; Future    Gastroesophageal reflux disease with esophagitis  -     pantoprazole (PROTONIX) 40 MG tablet; Take 1 tablet (40 mg total) by mouth once daily.  Dispense: 30 tablet; Refill: 5    Sciatica, unspecified laterality  -     tramadol-acetaminophen 37.5-325 mg (ULTRACET) 37.5-325 mg Tab; TAKE ONE TABLET BY MOUTH EVERY 6 HOURS AS NEEDED PAIN  Dispense: 30 tablet; Refill: 5  -     MRI Lumbar Spine Without Contrast; Future    Lumbar facet arthropathy  -     tramadol-acetaminophen 37.5-325 mg (ULTRACET) 37.5-325 mg Tab; TAKE ONE TABLET BY MOUTH EVERY 6 HOURS AS NEEDED PAIN  Dispense: 30 tablet; Refill: 5    Low back pain, non-specific  -     MRI Lumbar Spine Without Contrast; Future    Other idiopathic scoliosis, lumbar region  -     MRI Lumbar Spine Without Contrast; Future      Attention deficit hyperactivity disorder - stable on current medication  The current medication will be changed to Adderall  15 minute discussion with the patient regarding the importance of proper sleep hygiene was completed. The patientwill be encouraged to go to bed at the same time and wake up at the same time even on the weekends. The patient was encouraged to continue giving the medication even on weekends and holidays.    Cervical spondylolysis.    Continue mobic and gabapentin.     Lumbar facet arthropathy/moderate lumbar spinal stenosis on MRI/left sciatic pain/Scoliosis  Celebrex  Gabapentin  Tramadol prn pain  Ref to Neurosurgery clinic at \[facility]\"" due to medicaid  They need an updated MRI    Cervical spondylarthritis  Celebrex  Tramadol as needed    HTN (hypertension)  Two gram sodium diet.    Weight loss discussed.    Try to walk 2 miles per day.    Quit smoking.    Current medications will be:  Chlorthalidone (HYGROTEN) 25 MG Tab; Take 1 tablet (25 mg total) by mouth once daily.    Hypokalemia  -     potassium chloride SA (K-DUR,KLOR-CON) 20 MEQ tablet; Take 1 tablet (20 mEq total) by mouth  2 (two) times daily.  Dispense: 60 tablet; Refill: 2    Attention deficit hyperactivity disorder (ADHD), unspecified ADHD type  -     dextroamphetamine-amphetamine (ADDERALL) 30 mg Tab; 1 po q am, noon  Dispense: 60 tablet; Refill: 0    Benign essential HTN  -     chlorthalidone (HYGROTEN) 25 MG Tab; Take 1 tablet (25 mg total) by mouth once daily.  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  Dispense: 30 tablet; Refill: 0  -     colchicine (COLCRYS) 0.6 mg tablet; Take 1 tablet (0.6 mg total) by mouth once daily.  Dispense: 30 tablet; Refill: 5      The next appointment will be every 3 months.

## 2018-03-22 ENCOUNTER — TELEPHONE (OUTPATIENT)
Dept: OBSTETRICS AND GYNECOLOGY | Facility: CLINIC | Age: 53
End: 2018-03-22

## 2018-03-22 NOTE — TELEPHONE ENCOUNTER
----- Message from Caroline Soares MA sent at 3/22/2018  1:35 PM CDT -----  Contact: juan Robison  MRN: [id number]  Home Phone      [phone]  Work Phone      Not on file.  Mobile          [phone]    Patient Care Team:  Diego Day MD as PCP - General (Family Medicine)  Heraclio Tierney MD as Obstetrician (Obstetrics)  OB? No  What phone number can you be reached at?  [phone]  Message:  Would like to see about getting Rx called in for daypro.    Pharmacy:  [facility]

## 2018-03-29 ENCOUNTER — HOSPITAL ENCOUNTER (OUTPATIENT)
Dept: RADIOLOGY | Facility: HOSPITAL | Age: 53
Discharge: HOME | End: 2018-03-29
Attending: FAMILY MEDICINE
Payer: MEDICAID

## 2018-03-29 DIAGNOSIS — M54.50 LOW BACK PAIN, NON-SPECIFIC: ICD-10-CM

## 2018-03-29 DIAGNOSIS — M48.062 SPINAL STENOSIS OF LUMBAR REGION WITH NEUROGENIC CLAUDICATION: ICD-10-CM

## 2018-03-29 DIAGNOSIS — M54.32 SCIATICA OF LEFT SIDE: ICD-10-CM

## 2018-03-29 DIAGNOSIS — M54.30 SCIATICA, UNSPECIFIED LATERALITY: ICD-10-CM

## 2018-03-29 DIAGNOSIS — M41.26 OTHER IDIOPATHIC SCOLIOSIS, LUMBAR REGION: ICD-10-CM

## 2018-03-29 PROCEDURE — 72148 MRI LUMBAR SPINE W/O DYE: CPT | Mod: TC

## 2018-03-29 PROCEDURE — 72148 MRI LUMBAR SPINE W/O DYE: CPT | Mod: 26,,, | Performed by: RADIOLOGY

## 2018-04-01 DIAGNOSIS — M1A.0710 IDIOPATHIC CHRONIC GOUT OF RIGHT FOOT WITHOUT TOPHUS: ICD-10-CM

## 2018-04-04 DIAGNOSIS — F90.9 ATTENTION DEFICIT HYPERACTIVITY DISORDER (ADHD), UNSPECIFIED ADHD TYPE: ICD-10-CM

## 2018-04-04 RX ORDER — DEXTROAMPHETAMINE SACCHARATE, AMPHETAMINE ASPARTATE, DEXTROAMPHETAMINE SULFATE AND AMPHETAMINE SULFATE 7.5; 7.5; 7.5; 7.5 MG/1; MG/1; MG/1; MG/1
TABLET ORAL
Qty: 60 TABLET | Refills: 0 | Status: CANCELLED | OUTPATIENT
Start: 2018-04-04

## 2018-04-04 NOTE — TELEPHONE ENCOUNTER
----- Message from France Pena MA sent at 2018  5:30 PM CDT -----  Contact: Self  Sylvain Robison  MRN: [id number]  : 1965  PCP: Diego Day  Home Phone      [phone]  Work Phone      Not on file.  Mobile          [phone]      MESSAGE: Patient states that she needs a refill on all of her medication. Wants them sent in before Friday because her pharmacy is closing after Friday.  Pharmacy: [facility].

## 2018-04-08 RX ORDER — ALLOPURINOL 300 MG/1
TABLET ORAL
Qty: 30 TABLET | Refills: 5 | Status: SHIPPED | OUTPATIENT
Start: 2018-04-08 | End: 2018-05-01

## 2018-04-08 NOTE — PROGRESS NOTES
Tell patient that mri show scoliosis, mild central canal stenosis, mild foraminal narrowing.  Nothing too severe.

## 2018-04-09 ENCOUNTER — TELEPHONE (OUTPATIENT)
Dept: FAMILY MEDICINE | Facility: CLINIC | Age: 53
End: 2018-04-09

## 2018-04-09 NOTE — TELEPHONE ENCOUNTER
----- Message from Diego Day MD sent at 4/8/2018  7:22 AM CDT -----  Tell patient that mri show scoliosis, mild central canal stenosis, mild foraminal narrowing.  Nothing too severe.

## 2018-04-09 NOTE — TELEPHONE ENCOUNTER
Informed pt of recommendations. Verbalized understanding. Patient will call back as soon as pt get insurance again.

## 2018-04-11 NOTE — TELEPHONE ENCOUNTER
Spoke to patient, states she no longer has medicaid coverage. States she is in the process of trying to find another insurance.     States [facility] in [facility] pharmacy. States she will verify with [facility] to make sure all her prescriptions transferred and will call back if needing refills.

## 2018-04-23 DIAGNOSIS — F90.9 ATTENTION DEFICIT HYPERACTIVITY DISORDER (ADHD), UNSPECIFIED ADHD TYPE: ICD-10-CM

## 2018-04-23 NOTE — TELEPHONE ENCOUNTER
"----- Message from Niurka Garza sent at 2018 12:06 PM CDT -----  Contact: Self  Sylvain Robison  MRN: [id number]  : 1965  PCP: Diego Day  Home Phone      [phone]  Work Phone      Not on file.  Mobile          [phone]    MESSAGE:   Needs "written" prescription for RX dextroamphetamine-amphetamine 30 mg Tab to take to new pharmacy.  They were previously getting this filled at [facility], but are having to change pharmacies, and [facility] is requiring a new written prescription to transfer. Please call when ready.    Phone: [phone]  "

## 2018-04-24 RX ORDER — DEXTROAMPHETAMINE SACCHARATE, AMPHETAMINE ASPARTATE, DEXTROAMPHETAMINE SULFATE AND AMPHETAMINE SULFATE 7.5; 7.5; 7.5; 7.5 MG/1; MG/1; MG/1; MG/1
TABLET ORAL
Qty: 60 TABLET | Refills: 0 | Status: SHIPPED | OUTPATIENT
Start: 2018-04-24 | End: 2018-05-23 | Stop reason: SDUPTHER

## 2018-05-01 PROBLEM — M10.9 GOUT: Status: ACTIVE | Noted: 2018-05-01

## 2018-05-04 ENCOUNTER — LAB VISIT (OUTPATIENT)
Dept: LAB | Facility: HOSPITAL | Age: 53
End: 2018-05-04
Attending: FAMILY MEDICINE

## 2018-05-04 DIAGNOSIS — Z12.11 SCREEN FOR COLON CANCER: ICD-10-CM

## 2018-05-04 LAB — HEMOCCULT STL QL IA: NEGATIVE

## 2018-05-04 PROCEDURE — 82274 ASSAY TEST FOR BLOOD FECAL: CPT

## 2018-05-23 DIAGNOSIS — F90.9 ATTENTION DEFICIT HYPERACTIVITY DISORDER (ADHD), UNSPECIFIED ADHD TYPE: ICD-10-CM

## 2018-05-23 RX ORDER — DEXTROAMPHETAMINE SACCHARATE, AMPHETAMINE ASPARTATE, DEXTROAMPHETAMINE SULFATE AND AMPHETAMINE SULFATE 7.5; 7.5; 7.5; 7.5 MG/1; MG/1; MG/1; MG/1
TABLET ORAL
Qty: 60 TABLET | Refills: 0 | Status: SHIPPED | OUTPATIENT
Start: 2018-05-23 | End: 2018-06-15 | Stop reason: SDUPTHER

## 2018-05-23 NOTE — TELEPHONE ENCOUNTER
----- Message from Sylvain Villar sent at 2018  8:25 AM CDT -----  Contact: Patient  Sylvain Robison  MRN: [id number]  : 1965  PCP: Diego Day  Home Phone      [phone]  Work Phone      Not on file.  [place]          [phone]      MESSAGE: requesting refill on Adderall 30 mg (takes BID) -- send to [facility]     Call [phone]    PCP: Barb

## 2018-06-11 ENCOUNTER — TELEPHONE (OUTPATIENT)
Dept: FAMILY MEDICINE | Facility: CLINIC | Age: 53
End: 2018-06-11

## 2018-06-11 NOTE — TELEPHONE ENCOUNTER
Our attempts to reach you by telephone have been unsuccessful. Please call our office at  [phone] [facility].

## 2018-06-11 NOTE — TELEPHONE ENCOUNTER
Pt will need to pay cash for meds, asking if Dr Cortez will continue to prescribe meds even if she cannot be seen at clinic, please advise

## 2018-06-11 NOTE — TELEPHONE ENCOUNTER
She has to be seen every 4 months in order to get Rx for schedule 2 meds, per Dr Cortez, message relayed to pt, verbalizes understanding

## 2018-06-11 NOTE — TELEPHONE ENCOUNTER
----- Message from Alison Jay sent at 2018  2:13 PM CDT -----  Contact: self  Sylvain Robison  MRN: [id number]  : 1965  PCP: Diego Day  Home Phone      [phone]  Work Phone      Not on file.  Mobile          [phone]      MESSAGE:   Patient no longer has insurance and would like discuss how this will affect her refills.     Phone: [phone]

## 2018-06-15 ENCOUNTER — OFFICE VISIT (OUTPATIENT)
Dept: FAMILY MEDICINE | Facility: CLINIC | Age: 53
End: 2018-06-15

## 2018-06-15 VITALS
WEIGHT: 177 LBS | HEIGHT: 63 IN | SYSTOLIC BLOOD PRESSURE: 140 MMHG | HEART RATE: 98 BPM | RESPIRATION RATE: 18 BRPM | BODY MASS INDEX: 31.36 KG/M2 | DIASTOLIC BLOOD PRESSURE: 82 MMHG

## 2018-06-15 DIAGNOSIS — F90.9 ATTENTION DEFICIT HYPERACTIVITY DISORDER (ADHD), UNSPECIFIED ADHD TYPE: Primary | ICD-10-CM

## 2018-06-15 DIAGNOSIS — M48.062 SPINAL STENOSIS OF LUMBAR REGION WITH NEUROGENIC CLAUDICATION: ICD-10-CM

## 2018-06-15 DIAGNOSIS — F41.9 ANXIETY: ICD-10-CM

## 2018-06-15 PROCEDURE — 99212 OFFICE O/P EST SF 10 MIN: CPT | Mod: S$PBB,,, | Performed by: FAMILY MEDICINE

## 2018-06-15 PROCEDURE — 99213 OFFICE O/P EST LOW 20 MIN: CPT | Mod: PBBFAC | Performed by: FAMILY MEDICINE

## 2018-06-15 PROCEDURE — 99999 PR PBB SHADOW E&M-EST. PATIENT-LVL III: CPT | Mod: PBBFAC,,, | Performed by: FAMILY MEDICINE

## 2018-06-15 RX ORDER — DEXTROAMPHETAMINE SACCHARATE, AMPHETAMINE ASPARTATE, DEXTROAMPHETAMINE SULFATE AND AMPHETAMINE SULFATE 7.5; 7.5; 7.5; 7.5 MG/1; MG/1; MG/1; MG/1
TABLET ORAL
Qty: 60 TABLET | Refills: 0 | Status: SHIPPED | OUTPATIENT
Start: 2018-08-20 | End: 2018-09-14 | Stop reason: SDUPTHER

## 2018-06-15 RX ORDER — DEXTROAMPHETAMINE SACCHARATE, AMPHETAMINE ASPARTATE, DEXTROAMPHETAMINE SULFATE AND AMPHETAMINE SULFATE 7.5; 7.5; 7.5; 7.5 MG/1; MG/1; MG/1; MG/1
TABLET ORAL
Qty: 60 TABLET | Refills: 0 | Status: SHIPPED | OUTPATIENT
Start: 2018-06-22 | End: 2018-09-14 | Stop reason: SDUPTHER

## 2018-06-15 RX ORDER — IBUPROFEN 600 MG/1
600 TABLET ORAL EVERY 8 HOURS PRN
Qty: 50 TABLET | Refills: 11 | Status: SHIPPED | OUTPATIENT
Start: 2018-06-15 | End: 2018-12-14

## 2018-06-15 RX ORDER — DEXTROAMPHETAMINE SACCHARATE, AMPHETAMINE ASPARTATE, DEXTROAMPHETAMINE SULFATE AND AMPHETAMINE SULFATE 7.5; 7.5; 7.5; 7.5 MG/1; MG/1; MG/1; MG/1
TABLET ORAL
Qty: 60 TABLET | Refills: 0 | Status: SHIPPED | OUTPATIENT
Start: 2018-07-21 | End: 2018-09-14 | Stop reason: SDUPTHER

## 2018-06-15 NOTE — PROGRESS NOTES
Pt is a 53 y.o. female who presents for check up for   No diagnosis found.. Doing well on current meds. Denies any side effects. Prevention is up to date.    History of present illness:   Sylvain Robison is a 53 y.o. female here for a checkup for her elevated blood pressure.  She was started on chlorthalidone 25 mg every morning.  She tolerates it well.  Her blood pressure is better.  She is losing weight.  She recently went to the emergency room for severe right foot pain.  She was diagnosed with acute gout.  She's taking colchicine, allopurinol.  Her foot pain has improved.  She is doing very well on Adderall.  She is not having trouble sleeping. She is Handling stress at home very well.  She is not having side effects from the medication such as palpitations, anxiety attacks.  She is able to focus and stay on track and get projects finished.  Her  is being treated for lymphoma recurrence and is scheduled for bone marrow transplant.  There is a lot of stress at home.   Complains of neck pain with pain and numbness radiating into her right shoulder.  Comes and goes.  Gabapentin and Anaprox helps.    X-ray was reviewed.  She has facet joint hypertrophy and loss of disc space at C5-6.  She also takes gabapentin which helps but it makes her sleepy.  She takes it twice daily.  Having sciatica pain in left leg.  Tried neurontin which helped.  MRI shows lumbar facet arthropathy, mild lumbar spinal stenosis.  Gabapentin has been very effective.  She is not taking her Celebrex.  She gets a lot of pain in her left hip and leg at night.  Tramadol usually helps but not lately.  She received epidural sterile injections which helped a little.  She states the pain is back and she would like to pursue surgical options.  She will need an updated MRI before neurosurgical evaluation can take place.    Review of systems:  Gen.: No weight loss  HEENT: No headaches, sinus congestion, change in vision  Cardiovascular: No chest  pain, palpitations  Respiratory: No cough, shortness of breath  Neurological: Sleeping well, no weakness, no syncope, normal memory, no seizure, no ticks  MSK:  Leg cramps    Physical exam:   Vitals:    06/15/18 0958   BP: (!) 140/82   Pulse: 98   Resp: 18     Gen.: Well developed, well nourished white female in no apparent distress  HEENT: Pupils equal round reactive to light and accommodation, extraocular muscles intact, TMs are normal translucent mobile, neck is supple no lymphadenopathy no JVD, throat is clear.  Neck exam: Good range of motion.  Mild Spurling sign.  Cracking palpable.  Upper extremity strength equal and strong.  Upper extremity reflexes 2+ bilaterally   Heart:Cardiac exam revealed the PMI to be normally situated and sized. The rhythm was regular and no extrasystoles were noted during several minutes of auscultation. The first and second heart sounds were normal and physiologic splitting of the second heart sound was noted. There were no murmurs, rubs, clicks, or gallops.  Lungs:Lungs were clear to auscultation and percussion, and with normal diaphragmatic excursion. No wheezes or rales were noted.   Neuro: Neurologically, the patient was awake, alert, and oriented to person, place and time. There were no obvious focal neurologic abnormalities.  Extremities: No clubbing cyanosis or edema.  Right foot swollen and tender  Back: Moderate tenderness and spasm the lumbar region, negative straight leg raise, DTRs are 2+ and equal in knees, Absent in ankles.  Strength in the legs is 5+ equally.  Scoliosis is present.  Straight leg raise did cause lumbar pain and pain into left leg.  Subjective numbness in left lower leg.    Lab Results   Component Value Date    LDLCALC 136.8 05/02/2018     BMP  Lab Results   Component Value Date     05/02/2018    K 3.6 05/02/2018     05/02/2018    CO2 28 05/02/2018    BUN 17 05/02/2018    CREATININE 0.9 05/02/2018    CALCIUM 9.7 05/02/2018    ANIONGAP 12  05/02/2018    ESTGFRAFRICA >60.0 05/02/2018    EGFRNONAA >60.0 05/02/2018       Lab Results   Component Value Date    URICACID 3.8 12/20/2017     Assessment/plan:     There are no diagnoses linked to this encounter.  Attention deficit hyperactivity disorder - stable on current medication  The current medication will be changed to Adderall  15 minute discussion with the patient regarding the importance of proper sleep hygiene was completed. The patientwill be encouraged to go to bed at the same time and wake up at the same time even on the weekends. The patient was encouraged to continue giving the medication even on weekends and holidays.    Cervical spondylolysis.    Continue mobic and gabapentin.     Lumbar facet arthropathy/moderate lumbar spinal stenosis on MRI/left sciatic pain/Scoliosis  Celebrex  Gabapentin  Tramadol prn pain and Motrin 600 mg po tid prn    Cervical spondylarthritis  Celebrex  Tramadol as needed    HTN (hypertension)  Two gram sodium diet.    Weight loss discussed.    Try to walk 2 miles per day.    Quit smoking.    Current medications will be:  Chlorthalidone (HYGROTEN) 25 MG Tab; Take 1 tablet (25 mg total) by mouth once daily.    Hypokalemia  -     potassium chloride SA (K-DUR,KLOR-CON) 20 MEQ tablet; Take 1 tablet (20 mEq total) by mouth 2 (two) times daily.  Dispense: 60 tablet; Refill: 2    Attention deficit hyperactivity disorder (ADHD), unspecified ADHD type  -     dextroamphetamine-amphetamine (ADDERALL) 30 mg Tab; 1 po q am, noon  Dispense: 60 tablet; Refill: 0    Benign essential HTN  -     chlorthalidone (HYGROTEN) 25 MG Tab; Take 1 tablet (25 mg total) by mouth once daily.  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  Dispense: 30 tablet; Refill: 0  -     colchicine (COLCRYS) 0.6 mg tablet; Take 1 tablet (0.6 mg total) by mouth once daily.   Dispense: 30 tablet; Refill: 5      The next appointment will be every 3 months.

## 2018-06-22 RX ORDER — PANTOPRAZOLE SODIUM 40 MG/1
TABLET, DELAYED RELEASE ORAL
Qty: 30 TABLET | Refills: 1 | Status: SHIPPED | OUTPATIENT
Start: 2018-06-22 | End: 2018-08-24 | Stop reason: SDUPTHER

## 2018-07-11 DIAGNOSIS — I10 BENIGN ESSENTIAL HTN: ICD-10-CM

## 2018-07-11 RX ORDER — CHLORTHALIDONE 25 MG/1
TABLET ORAL
Qty: 30 TABLET | Refills: 2 | Status: SHIPPED | OUTPATIENT
Start: 2018-07-11 | End: 2018-10-12

## 2018-08-24 RX ORDER — PANTOPRAZOLE SODIUM 40 MG/1
TABLET, DELAYED RELEASE ORAL
Qty: 30 TABLET | Refills: 5 | Status: SHIPPED | OUTPATIENT
Start: 2018-08-24 | End: 2019-02-25 | Stop reason: SDUPTHER

## 2018-09-14 ENCOUNTER — OFFICE VISIT (OUTPATIENT)
Dept: FAMILY MEDICINE | Facility: CLINIC | Age: 53
End: 2018-09-14

## 2018-09-14 VITALS
HEART RATE: 100 BPM | DIASTOLIC BLOOD PRESSURE: 80 MMHG | HEIGHT: 63 IN | BODY MASS INDEX: 31.68 KG/M2 | RESPIRATION RATE: 20 BRPM | WEIGHT: 178.81 LBS | SYSTOLIC BLOOD PRESSURE: 128 MMHG

## 2018-09-14 DIAGNOSIS — F90.9 ATTENTION DEFICIT HYPERACTIVITY DISORDER (ADHD), UNSPECIFIED ADHD TYPE: ICD-10-CM

## 2018-09-14 DIAGNOSIS — F41.9 ANXIETY: ICD-10-CM

## 2018-09-14 DIAGNOSIS — M47.816 LUMBAR FACET ARTHROPATHY: Primary | ICD-10-CM

## 2018-09-14 DIAGNOSIS — F41.1 GAD (GENERALIZED ANXIETY DISORDER): ICD-10-CM

## 2018-09-14 DIAGNOSIS — F90.0 ATTENTION DEFICIT HYPERACTIVITY DISORDER (ADHD), PREDOMINANTLY INATTENTIVE TYPE: ICD-10-CM

## 2018-09-14 DIAGNOSIS — I10 BENIGN ESSENTIAL HTN: ICD-10-CM

## 2018-09-14 DIAGNOSIS — M48.062 SPINAL STENOSIS OF LUMBAR REGION WITH NEUROGENIC CLAUDICATION: ICD-10-CM

## 2018-09-14 DIAGNOSIS — M1A.9XX0 CHRONIC GOUT WITHOUT TOPHUS, UNSPECIFIED CAUSE, UNSPECIFIED SITE: ICD-10-CM

## 2018-09-14 DIAGNOSIS — M1A.0710 IDIOPATHIC CHRONIC GOUT OF RIGHT FOOT WITHOUT TOPHUS: ICD-10-CM

## 2018-09-14 PROCEDURE — 99999 PR PBB SHADOW E&M-EST. PATIENT-LVL III: CPT | Mod: PBBFAC,,, | Performed by: FAMILY MEDICINE

## 2018-09-14 PROCEDURE — 99213 OFFICE O/P EST LOW 20 MIN: CPT | Mod: S$PBB,,, | Performed by: FAMILY MEDICINE

## 2018-09-14 PROCEDURE — 99213 OFFICE O/P EST LOW 20 MIN: CPT | Mod: PBBFAC | Performed by: FAMILY MEDICINE

## 2018-09-14 RX ORDER — DEXTROAMPHETAMINE SACCHARATE, AMPHETAMINE ASPARTATE, DEXTROAMPHETAMINE SULFATE AND AMPHETAMINE SULFATE 7.5; 7.5; 7.5; 7.5 MG/1; MG/1; MG/1; MG/1
TABLET ORAL
Qty: 60 TABLET | Refills: 0 | Status: SHIPPED | OUTPATIENT
Start: 2018-11-19 | End: 2018-12-14 | Stop reason: SDUPTHER

## 2018-09-14 RX ORDER — DEXTROAMPHETAMINE SACCHARATE, AMPHETAMINE ASPARTATE, DEXTROAMPHETAMINE SULFATE AND AMPHETAMINE SULFATE 7.5; 7.5; 7.5; 7.5 MG/1; MG/1; MG/1; MG/1
TABLET ORAL
Qty: 60 TABLET | Refills: 0 | Status: SHIPPED | OUTPATIENT
Start: 2018-09-22 | End: 2018-09-25 | Stop reason: SDUPTHER

## 2018-09-14 RX ORDER — TRAMADOL HYDROCHLORIDE 50 MG/1
50 TABLET ORAL EVERY 6 HOURS PRN
Qty: 30 TABLET | Refills: 2 | Status: SHIPPED | OUTPATIENT
Start: 2018-09-14 | End: 2019-04-28 | Stop reason: SDUPTHER

## 2018-09-14 RX ORDER — ALLOPURINOL 300 MG/1
300 TABLET ORAL DAILY
Qty: 30 TABLET | Refills: 5 | Status: SHIPPED | OUTPATIENT
Start: 2018-09-14 | End: 2019-03-15 | Stop reason: SDUPTHER

## 2018-09-14 RX ORDER — ALPRAZOLAM 0.5 MG/1
0.5 TABLET ORAL 2 TIMES DAILY PRN
Qty: 60 TABLET | Refills: 5 | Status: SHIPPED | OUTPATIENT
Start: 2018-09-14 | End: 2019-03-15 | Stop reason: SDUPTHER

## 2018-09-14 RX ORDER — GABAPENTIN 300 MG/1
300 CAPSULE ORAL 2 TIMES DAILY
Qty: 60 CAPSULE | Refills: 5 | Status: SHIPPED | OUTPATIENT
Start: 2018-09-14 | End: 2019-03-15 | Stop reason: SDUPTHER

## 2018-09-14 RX ORDER — DEXTROAMPHETAMINE SACCHARATE, AMPHETAMINE ASPARTATE, DEXTROAMPHETAMINE SULFATE AND AMPHETAMINE SULFATE 7.5; 7.5; 7.5; 7.5 MG/1; MG/1; MG/1; MG/1
TABLET ORAL
Qty: 60 TABLET | Refills: 0 | Status: SHIPPED | OUTPATIENT
Start: 2018-10-20 | End: 2018-12-14 | Stop reason: SDUPTHER

## 2018-09-14 NOTE — PROGRESS NOTES
Pt is a 53 y.o. female who presents for check up for   Encounter Diagnoses   Name Primary?    Lumbar facet arthropathy Yes    Spinal stenosis of lumbar region with neurogenic claudication     Attention deficit hyperactivity disorder (ADHD), predominantly inattentive type     Attention deficit hyperactivity disorder (ADHD), unspecified ADHD type     GIOVANA (generalized anxiety disorder)     Idiopathic chronic gout of right foot without tophus     Chronic gout without tophus, unspecified cause, unspecified site     Benign essential HTN     Anxiety    . Doing well on current meds. Denies any side effects. Prevention is up to date.    History of present illness:   Sylvain Robison is a 53 y.o. female here for a checkup.  She recently lost her health insurance.  She is now getting most of her care at [facility].  She has a history of hypertension She was started on chlorthalidone 25 mg every morning.  She tolerates it well.  Her blood pressure is better.  She is losing weight.  Patient has a history of gout in her right foot.  She's taking colchicine, allopurinol.  Her foot pain has improved.  She is doing very well on Adderall.  She is not having trouble sleeping. She is Handling stress at home very well.  She is not having side effects from the medication such as palpitations, anxiety attacks.  She is able to focus and stay on track and get projects finished.  Her  is being treated for lymphoma recurrence and is scheduled for bone marrow transplant.  There is a lot of stress at home.   Complains of neck pain with pain and numbness radiating into her right shoulder.  Comes and goes.  Gabapentin and Anaprox helps.    X-ray was reviewed.  She has facet joint hypertrophy and loss of disc space at C5-6.  She also takes gabapentin which helps but it makes her sleepy.  She takes it twice daily.  Having sciatica pain in left leg.  Tried neurontin which helped.  MRI shows lumbar facet arthropathy, mild lumbar spinal  stenosis.  Gabapentin has been very effective.  She is not taking her Celebrex.  She gets a lot of pain in her left hip and leg at night.  Tramadol usually helps but not lately.  She received epidural sterile injections which helped a little.  She states the pain is back and she would like to pursue surgical options.  She will need an updated MRI before neurosurgical evaluation can take place.    Review of systems:  Gen.: No weight loss  HEENT: No headaches, sinus congestion, change in vision  Cardiovascular: No chest pain, palpitations  Respiratory: No cough, shortness of breath  Neurological: Sleeping well, no weakness, no syncope, normal memory, no seizure, no ticks  MSK:  Leg cramps    Physical exam:   Vitals:    09/14/18 1229   BP: 128/80   Pulse: 100   Resp: 20     Gen.: Well developed, well nourished white female in no apparent distress  HEENT: Pupils equal round reactive to light and accommodation, extraocular muscles intact, TMs are normal translucent mobile, neck is supple no lymphadenopathy no JVD, throat is clear.  Neck exam: Good range of motion.  Mild Spurling sign.  Cracking palpable.  Upper extremity strength equal and strong.  Upper extremity reflexes 2+ bilaterally   Heart:Cardiac exam revealed the PMI to be normally situated and sized. The rhythm was regular and no extrasystoles were noted during several minutes of auscultation. The first and second heart sounds were normal and physiologic splitting of the second heart sound was noted. There were no murmurs, rubs, clicks, or gallops.  Lungs:Lungs were clear to auscultation and percussion, and with normal diaphragmatic excursion. No wheezes or rales were noted.   Neuro: Neurologically, the patient was awake, alert, and oriented to person, place and time. There were no obvious focal neurologic abnormalities.  Extremities: No clubbing cyanosis or edema.  Right foot swollen and tender  Back: Moderate tenderness and spasm the lumbar region, negative  straight leg raise, DTRs are 2+ and equal in knees, Absent in ankles.  Strength in the legs is 5+ equally.  Scoliosis is present.  Straight leg raise did cause lumbar pain and pain into left leg.  Subjective numbness in left lower leg.    Lab Results   Component Value Date    LDLCALC 136.8 05/02/2018     BMP  Lab Results   Component Value Date     05/02/2018    K 3.6 05/02/2018     05/02/2018    CO2 28 05/02/2018    BUN 17 05/02/2018    CREATININE 0.9 05/02/2018    CALCIUM 9.7 05/02/2018    ANIONGAP 12 05/02/2018    ESTGFRAFRICA >60.0 05/02/2018    EGFRNONAA >60.0 05/02/2018       Lab Results   Component Value Date    URICACID 3.8 12/20/2017     Assessment/plan:     Lumbar facet arthropathy  -     traMADol (ULTRAM) 50 mg tablet; Take 1 tablet (50 mg total) by mouth every 6 (six) hours as needed for Pain.  Dispense: 30 tablet; Refill: 2    Spinal stenosis of lumbar region with neurogenic claudication  -     traMADol (ULTRAM) 50 mg tablet; Take 1 tablet (50 mg total) by mouth every 6 (six) hours as needed for Pain.  Dispense: 30 tablet; Refill: 2  -     gabapentin (NEURONTIN) 300 MG capsule; Take 1 capsule (300 mg total) by mouth 2 (two) times daily.  Dispense: 60 capsule; Refill: 5    Attention deficit hyperactivity disorder (ADHD), predominantly inattentive type  -     dextroamphetamine-amphetamine 30 mg Tab; TAKE 1 TABLET BY MOUTH THE MORNING AND ONE AT NOON  Dispense: 60 tablet; Refill: 0  -     dextroamphetamine-amphetamine 30 mg Tab; TAKE 1 TABLET BY MOUTH THE MORNING AND ONE AT NOON  Dispense: 60 tablet; Refill: 0  -     dextroamphetamine-amphetamine 30 mg Tab; TAKE 1 TABLET BY MOUTH THE MORNING AND ONE AT NOON  Dispense: 60 tablet; Refill: 0    Attention deficit hyperactivity disorder (ADHD), unspecified ADHD type  -     dextroamphetamine-amphetamine 30 mg Tab; TAKE 1 TABLET BY MOUTH THE MORNING AND ONE AT NOON  Dispense: 60 tablet; Refill: 0  -     dextroamphetamine-amphetamine 30 mg Tab; TAKE 1  TABLET BY MOUTH THE MORNING AND ONE AT NOON  Dispense: 60 tablet; Refill: 0  -     dextroamphetamine-amphetamine 30 mg Tab; TAKE 1 TABLET BY MOUTH THE MORNING AND ONE AT NOON  Dispense: 60 tablet; Refill: 0    GIOVANA (generalized anxiety disorder)  -     ALPRAZolam (XANAX) 0.5 MG tablet; Take 1 tablet (0.5 mg total) by mouth 2 (two) times daily as needed.  Dispense: 60 tablet; Refill: 5    Idiopathic chronic gout of right foot without tophus  -     allopurinol (ZYLOPRIM) 300 MG tablet; Take 1 tablet (300 mg total) by mouth once daily.  Dispense: 30 tablet; Refill: 5    Chronic gout without tophus, unspecified cause, unspecified site    Benign essential HTN    Anxiety      Attention deficit hyperactivity disorder - stable on current medication  The current medication will be changed to Adderall  15 minute discussion with the patient regarding the importance of proper sleep hygiene was completed. The patientwill be encouraged to go to bed at the same time and wake up at the same time even on the weekends. The patient was encouraged to continue giving the medication even on weekends and holidays.    Cervical spondylolysis.    Continue mobic and gabapentin.     Lumbar facet arthropathy/moderate lumbar spinal stenosis on MRI/left sciatic pain/Scoliosis  Celebrex  Gabapentin  Tramadol prn pain and Motrin 600 mg po tid prn  Continue gabapentin    Cervical spondylarthritis  Celebrex  Tramadol as needed    HTN (hypertension)  Two gram sodium diet.    Weight loss discussed.    Try to walk 2 miles per day.    Quit smoking.    Current medications will be:  Chlorthalidone (HYGROTEN) 25 MG Tab; Take 1 tablet (25 mg total) by mouth once daily.    Hypokalemia  -     potassium chloride SA (K-DUR,KLOR-CON) 20 MEQ tablet; Take 1 tablet (20 mEq total) by mouth 2 (two) times daily.  Dispense: 60 tablet; Refill: 2    Attention deficit hyperactivity disorder (ADHD), unspecified ADHD type  -     dextroamphetamine-amphetamine (ADDERALL) 30 mg  Tab; 1 po q am, noon  Dispense: 60 tablet; Refill: 0    Benign essential HTN  -     chlorthalidone (HYGROTEN) 25 MG Tab; Take 1 tablet (25 mg total) by mouth once daily.  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  Dispense: 30 tablet; Refill: 0  -     colchicine (COLCRYS) 0.6 mg tablet; Take 1 tablet (0.6 mg total) by mouth once daily.  Dispense: 30 tablet; Refill: 5      The next appointment will be every 3 months.

## 2018-09-25 DIAGNOSIS — F90.9 ATTENTION DEFICIT HYPERACTIVITY DISORDER (ADHD), UNSPECIFIED ADHD TYPE: ICD-10-CM

## 2018-09-25 DIAGNOSIS — F90.0 ATTENTION DEFICIT HYPERACTIVITY DISORDER (ADHD), PREDOMINANTLY INATTENTIVE TYPE: ICD-10-CM

## 2018-09-25 RX ORDER — DEXTROAMPHETAMINE SACCHARATE, AMPHETAMINE ASPARTATE, DEXTROAMPHETAMINE SULFATE AND AMPHETAMINE SULFATE 7.5; 7.5; 7.5; 7.5 MG/1; MG/1; MG/1; MG/1
TABLET ORAL
Qty: 60 TABLET | Refills: 0 | Status: SHIPPED | OUTPATIENT
Start: 2018-09-25 | End: 2018-12-14 | Stop reason: SDUPTHER

## 2018-09-25 NOTE — TELEPHONE ENCOUNTER
----- Message from Neda Zamudio sent at 2018  2:30 PM CDT -----  Contact: Self  Sylvain Robison  MRN: [id number]  : 1965  PCP: Diego Day  Home Phone      [phone]  Work Phone      Not on file.  Mobile          [phone]      MESSAGE:   Pt requesting refill or new Rx.   RX name and strength: dextroamphetamine-amphetamine 30 mg Tab  Is this a 30-day or 90-day RX:  30-Day  Pharm[facility] location[facility] [place]  Comments:    Patient would like us to [facility] [place] it is cheaper there than at [facility]    Phone: [phone]

## 2018-10-12 DIAGNOSIS — I10 BENIGN ESSENTIAL HTN: ICD-10-CM

## 2018-10-15 RX ORDER — CHLORTHALIDONE 25 MG/1
25 TABLET ORAL DAILY
Qty: 30 TABLET | Refills: 2 | Status: SHIPPED | OUTPATIENT
Start: 2018-10-15 | End: 2019-01-13 | Stop reason: SDUPTHER

## 2018-12-14 ENCOUNTER — OFFICE VISIT (OUTPATIENT)
Dept: FAMILY MEDICINE | Facility: CLINIC | Age: 53
End: 2018-12-14

## 2018-12-14 VITALS
WEIGHT: 176.63 LBS | HEART RATE: 90 BPM | SYSTOLIC BLOOD PRESSURE: 118 MMHG | RESPIRATION RATE: 18 BRPM | HEIGHT: 63 IN | DIASTOLIC BLOOD PRESSURE: 68 MMHG | BODY MASS INDEX: 31.3 KG/M2

## 2018-12-14 DIAGNOSIS — M1A.9XX0 CHRONIC GOUT WITHOUT TOPHUS, UNSPECIFIED CAUSE, UNSPECIFIED SITE: ICD-10-CM

## 2018-12-14 DIAGNOSIS — F90.0 ATTENTION DEFICIT HYPERACTIVITY DISORDER (ADHD), PREDOMINANTLY INATTENTIVE TYPE: Primary | ICD-10-CM

## 2018-12-14 DIAGNOSIS — F90.9 ATTENTION DEFICIT HYPERACTIVITY DISORDER (ADHD), UNSPECIFIED ADHD TYPE: ICD-10-CM

## 2018-12-14 DIAGNOSIS — F41.9 ANXIETY: ICD-10-CM

## 2018-12-14 PROCEDURE — 99213 OFFICE O/P EST LOW 20 MIN: CPT | Mod: PBBFAC | Performed by: FAMILY MEDICINE

## 2018-12-14 PROCEDURE — 99213 OFFICE O/P EST LOW 20 MIN: CPT | Mod: S$PBB,,, | Performed by: FAMILY MEDICINE

## 2018-12-14 PROCEDURE — 99999 PR PBB SHADOW E&M-EST. PATIENT-LVL III: CPT | Mod: PBBFAC,,, | Performed by: FAMILY MEDICINE

## 2018-12-14 RX ORDER — COLCHICINE 0.6 MG/1
0.6 TABLET ORAL DAILY
Qty: 30 TABLET | Refills: 11 | Status: SHIPPED | OUTPATIENT
Start: 2018-12-14 | End: 2019-03-15

## 2018-12-14 RX ORDER — DEXTROAMPHETAMINE SACCHARATE, AMPHETAMINE ASPARTATE, DEXTROAMPHETAMINE SULFATE AND AMPHETAMINE SULFATE 7.5; 7.5; 7.5; 7.5 MG/1; MG/1; MG/1; MG/1
TABLET ORAL
Qty: 60 TABLET | Refills: 0 | Status: SHIPPED | OUTPATIENT
Start: 2019-02-18 | End: 2019-03-15 | Stop reason: SDUPTHER

## 2018-12-14 RX ORDER — DEXTROAMPHETAMINE SACCHARATE, AMPHETAMINE ASPARTATE, DEXTROAMPHETAMINE SULFATE AND AMPHETAMINE SULFATE 7.5; 7.5; 7.5; 7.5 MG/1; MG/1; MG/1; MG/1
TABLET ORAL
Qty: 60 TABLET | Refills: 0 | Status: SHIPPED | OUTPATIENT
Start: 2018-12-21 | End: 2019-03-15 | Stop reason: SDUPTHER

## 2018-12-14 RX ORDER — DEXTROAMPHETAMINE SACCHARATE, AMPHETAMINE ASPARTATE, DEXTROAMPHETAMINE SULFATE AND AMPHETAMINE SULFATE 7.5; 7.5; 7.5; 7.5 MG/1; MG/1; MG/1; MG/1
TABLET ORAL
Qty: 60 TABLET | Refills: 0 | Status: SHIPPED | OUTPATIENT
Start: 2019-01-19 | End: 2019-03-15 | Stop reason: SDUPTHER

## 2018-12-14 NOTE — PROGRESS NOTES
Pt is a 53 y.o. female who presents for check up for   Encounter Diagnoses   Name Primary?    Anxiety     Attention deficit hyperactivity disorder (ADHD), predominantly inattentive type Yes    Attention deficit hyperactivity disorder (ADHD), unspecified ADHD type     Chronic gout without tophus, unspecified cause, unspecified site    . Doing well on current meds. Denies any side effects. Prevention is up to date.    History of present illness:   Sylvain Robison is a 53 y.o. female here for a checkup.  She lost her health insurance.  She is now getting most of her care at [facility].  She has a history of hypertension She was started on chlorthalidone 25 mg every morning.  She tolerates it well.  Her blood pressure is better.  She is losing weight.  Patient has a history of gout in her right foot.  She's taking colchicine, allopurinol.  Her foot pain has improved.  She is doing very well on Adderall.  She is not having trouble sleeping. She is Handling stress at home very well.  She is not having side effects from the medication such as palpitations, anxiety attacks.  She is able to focus and stay on track and get projects finished.  Her  is being treated for lymphoma recurrence and is scheduled for bone marrow transplant.  There is a lot of stress at home.   Complains of neck pain with pain and numbness radiating into her right shoulder.  Comes and goes.  Gabapentin and Anaprox helps.    X-ray was reviewed.  She has facet joint hypertrophy and loss of disc space at C5-6.  She also takes gabapentin which helps but it makes her sleepy.  She takes it twice daily.  Having sciatica pain in left leg.  Tried neurontin which helped.  MRI shows lumbar facet arthropathy, mild lumbar spinal stenosis.  Gabapentin has been very effective.  She is not taking her Celebrex.  She gets a lot of pain in her left hip and leg at night.  Tramadol usually helps but not lately.  She received epidural sterile injections which helped a  little.  Lately she has been having pain in her right foot.    Review of systems:  Gen.: No weight loss  HEENT: No headaches, sinus congestion, change in vision  Cardiovascular: No chest pain, palpitations  Respiratory: No cough, shortness of breath  Neurological: Sleeping well, no weakness, no syncope, normal memory, no seizure, no ticks  MSK:  Leg cramps    Physical exam:   Vitals:    12/14/18 1248   BP: 118/68   Pulse: 90   Resp: 18     Gen.: Well developed, well nourished white female in no apparent distress  HEENT: Pupils equal round reactive to light and accommodation, extraocular muscles intact, TMs are normal translucent mobile, neck is supple no lymphadenopathy no JVD, throat is clear.  Neck exam: Good range of motion.  Mild Spurling sign.  Cracking palpable.  Upper extremity strength equal and strong.  Upper extremity reflexes 2+ bilaterally   Heart:Cardiac exam revealed the PMI to be normally situated and sized. The rhythm was regular and no extrasystoles were noted during several minutes of auscultation. The first and second heart sounds were normal and physiologic splitting of the second heart sound was noted. There were no murmurs, rubs, clicks, or gallops.  Lungs:Lungs were clear to auscultation and percussion, and with normal diaphragmatic excursion. No wheezes or rales were noted.   Neuro: Neurologically, the patient was awake, alert, and oriented to person, place and time. There were no obvious focal neurologic abnormalities.  Extremities: No clubbing cyanosis or edema.  Right foot swollen and tender  Back: Moderate tenderness and spasm the lumbar region, negative straight leg raise, DTRs are 2+ and equal in knees, Absent in ankles.  Strength in the legs is 5+ equally.  Scoliosis is present.  Straight leg raise did cause lumbar pain and pain into left leg.  Subjective numbness in left lower leg.  Right foot is tender over the dorsum.  She seems to have some metatarsalgia.  Both thumb joints are  tender.    Lab Results   Component Value Date    LDLCALC 136.8 05/02/2018     BMP  Lab Results   Component Value Date     05/02/2018    K 3.6 05/02/2018     05/02/2018    CO2 28 05/02/2018    BUN 17 05/02/2018    CREATININE 0.9 05/02/2018    CALCIUM 9.7 05/02/2018    ANIONGAP 12 05/02/2018    ESTGFRAFRICA >60.0 05/02/2018    EGFRNONAA >60.0 05/02/2018       Lab Results   Component Value Date    URICACID 3.8 12/20/2017     Assessment/plan:     Attention deficit hyperactivity disorder (ADHD), predominantly inattentive type  -     dextroamphetamine-amphetamine 30 mg Tab; TAKE 1 TABLET BY MOUTH THE MORNING AND ONE AT NOON  Dispense: 60 tablet; Refill: 0  -     dextroamphetamine-amphetamine 30 mg Tab; TAKE 1 TABLET BY MOUTH THE MORNING AND ONE AT NOON  Dispense: 60 tablet; Refill: 0  -     dextroamphetamine-amphetamine 30 mg Tab; TAKE 1 TABLET BY MOUTH THE MORNING AND ONE AT NOON  Dispense: 60 tablet; Refill: 0    Anxiety    Attention deficit hyperactivity disorder (ADHD), unspecified ADHD type  -     dextroamphetamine-amphetamine 30 mg Tab; TAKE 1 TABLET BY MOUTH THE MORNING AND ONE AT NOON  Dispense: 60 tablet; Refill: 0  -     dextroamphetamine-amphetamine 30 mg Tab; TAKE 1 TABLET BY MOUTH THE MORNING AND ONE AT NOON  Dispense: 60 tablet; Refill: 0  -     dextroamphetamine-amphetamine 30 mg Tab; TAKE 1 TABLET BY MOUTH THE MORNING AND ONE AT NOON  Dispense: 60 tablet; Refill: 0    Chronic gout without tophus, unspecified cause, unspecified site  -     colchicine (COLCRYS) 0.6 mg tablet; Take 1 tablet (0.6 mg total) by mouth once daily.  Dispense: 30 tablet; Refill: 11      Attention deficit hyperactivity disorder - stable on current medication  The current medication will be changed to Adderall  15 minute discussion with the patient regarding the importance of proper sleep hygiene was completed. The patientwill be encouraged to go to bed at the same time and wake up at the same time even on the weekends.  The patient was encouraged to continue giving the medication even on weekends and holidays.    Cervical spondylolysis.    Continue mobic and gabapentin.     Lumbar facet arthropathy/moderate lumbar spinal stenosis on MRI/left sciatic pain/Scoliosis  Celebrex  Gabapentin  Tramadol prn pain and Motrin 600 mg po tid prn  Continue gabapentin    Cervical spondylarthritis  Celebrex  Tramadol as needed    HTN (hypertension)  Two gram sodium diet.    Weight loss discussed.    Try to walk 2 miles per day.    Quit smoking.    Current medications will be:  Chlorthalidone (HYGROTEN) 25 MG Tab; Take 1 tablet (25 mg total) by mouth once daily.    Hypokalemia  -     potassium chloride SA (K-DUR,KLOR-CON) 20 MEQ tablet; Take 1 tablet (20 mEq total) by mouth 2 (two) times daily.  Dispense: 60 tablet; Refill: 2    Attention deficit hyperactivity disorder (ADHD), unspecified ADHD type  -     dextroamphetamine-amphetamine (ADDERALL) 30 mg Tab; 1 po q am, noon  Dispense: 60 tablet; Refill: 0    Benign essential HTN  -     chlorthalidone (HYGROTEN) 25 MG Tab; Take 1 tablet (25 mg total) by mouth once daily.  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  Dispense: 30 tablet; Refill: 0  -     colchicine (COLCRYS) 0.6 mg tablet; Take 1 tablet (0.6 mg total) by mouth once daily.  Dispense: 30 tablet; Refill: 5      The next appointment will be every 3 months.

## 2019-01-13 DIAGNOSIS — I10 BENIGN ESSENTIAL HTN: ICD-10-CM

## 2019-01-15 RX ORDER — CHLORTHALIDONE 25 MG/1
25 TABLET ORAL DAILY
Qty: 30 TABLET | Refills: 2 | Status: SHIPPED | OUTPATIENT
Start: 2019-01-15 | End: 2019-03-15 | Stop reason: SDUPTHER

## 2019-02-25 RX ORDER — PANTOPRAZOLE SODIUM 40 MG/1
TABLET, DELAYED RELEASE ORAL
Qty: 30 TABLET | Refills: 5 | Status: SHIPPED | OUTPATIENT
Start: 2019-02-25 | End: 2019-03-15 | Stop reason: SDUPTHER

## 2019-03-15 ENCOUNTER — OFFICE VISIT (OUTPATIENT)
Dept: FAMILY MEDICINE | Facility: CLINIC | Age: 54
End: 2019-03-15

## 2019-03-15 VITALS
BODY MASS INDEX: 32.57 KG/M2 | DIASTOLIC BLOOD PRESSURE: 86 MMHG | WEIGHT: 177 LBS | RESPIRATION RATE: 16 BRPM | SYSTOLIC BLOOD PRESSURE: 146 MMHG | HEART RATE: 88 BPM | HEIGHT: 62 IN

## 2019-03-15 DIAGNOSIS — F41.1 GAD (GENERALIZED ANXIETY DISORDER): ICD-10-CM

## 2019-03-15 DIAGNOSIS — F90.9 ATTENTION DEFICIT HYPERACTIVITY DISORDER (ADHD), UNSPECIFIED ADHD TYPE: Primary | ICD-10-CM

## 2019-03-15 DIAGNOSIS — M54.30 SCIATICA, UNSPECIFIED LATERALITY: ICD-10-CM

## 2019-03-15 DIAGNOSIS — I10 BENIGN ESSENTIAL HTN: ICD-10-CM

## 2019-03-15 DIAGNOSIS — M48.062 SPINAL STENOSIS OF LUMBAR REGION WITH NEUROGENIC CLAUDICATION: ICD-10-CM

## 2019-03-15 DIAGNOSIS — F90.0 ATTENTION DEFICIT HYPERACTIVITY DISORDER (ADHD), PREDOMINANTLY INATTENTIVE TYPE: ICD-10-CM

## 2019-03-15 DIAGNOSIS — M1A.0710 IDIOPATHIC CHRONIC GOUT OF RIGHT FOOT WITHOUT TOPHUS: ICD-10-CM

## 2019-03-15 DIAGNOSIS — M47.816 LUMBAR FACET ARTHROPATHY: ICD-10-CM

## 2019-03-15 DIAGNOSIS — K21.00 GASTROESOPHAGEAL REFLUX DISEASE WITH ESOPHAGITIS: ICD-10-CM

## 2019-03-15 PROCEDURE — 99213 OFFICE O/P EST LOW 20 MIN: CPT | Mod: PBBFAC | Performed by: FAMILY MEDICINE

## 2019-03-15 PROCEDURE — 99212 PR OFFICE/OUTPT VISIT, EST, LEVL II, 10-19 MIN: ICD-10-PCS | Mod: S$PBB,,, | Performed by: FAMILY MEDICINE

## 2019-03-15 PROCEDURE — 99999 PR PBB SHADOW E&M-EST. PATIENT-LVL III: ICD-10-PCS | Mod: PBBFAC,,, | Performed by: FAMILY MEDICINE

## 2019-03-15 PROCEDURE — 99999 PR PBB SHADOW E&M-EST. PATIENT-LVL III: CPT | Mod: PBBFAC,,, | Performed by: FAMILY MEDICINE

## 2019-03-15 PROCEDURE — 99212 OFFICE O/P EST SF 10 MIN: CPT | Mod: S$PBB,,, | Performed by: FAMILY MEDICINE

## 2019-03-15 RX ORDER — ALPRAZOLAM 0.5 MG/1
0.5 TABLET ORAL 2 TIMES DAILY PRN
Qty: 60 TABLET | Refills: 5 | Status: SHIPPED | OUTPATIENT
Start: 2019-03-15 | End: 2019-06-14 | Stop reason: SDUPTHER

## 2019-03-15 RX ORDER — DEXTROAMPHETAMINE SACCHARATE, AMPHETAMINE ASPARTATE, DEXTROAMPHETAMINE SULFATE AND AMPHETAMINE SULFATE 7.5; 7.5; 7.5; 7.5 MG/1; MG/1; MG/1; MG/1
TABLET ORAL
Qty: 60 TABLET | Refills: 0 | Status: SHIPPED | OUTPATIENT
Start: 2019-05-15 | End: 2019-06-14 | Stop reason: SDUPTHER

## 2019-03-15 RX ORDER — CHLORTHALIDONE 25 MG/1
25 TABLET ORAL DAILY
Qty: 30 TABLET | Refills: 2 | Status: SHIPPED | OUTPATIENT
Start: 2019-03-15 | End: 2019-06-12 | Stop reason: SDUPTHER

## 2019-03-15 RX ORDER — PANTOPRAZOLE SODIUM 40 MG/1
TABLET, DELAYED RELEASE ORAL
Qty: 30 TABLET | Refills: 5 | Status: SHIPPED | OUTPATIENT
Start: 2019-03-15 | End: 2019-06-14 | Stop reason: SDUPTHER

## 2019-03-15 RX ORDER — DEXTROAMPHETAMINE SACCHARATE, AMPHETAMINE ASPARTATE, DEXTROAMPHETAMINE SULFATE AND AMPHETAMINE SULFATE 7.5; 7.5; 7.5; 7.5 MG/1; MG/1; MG/1; MG/1
TABLET ORAL
Qty: 60 TABLET | Refills: 0 | Status: SHIPPED | OUTPATIENT
Start: 2019-03-15 | End: 2019-04-22 | Stop reason: SDUPTHER

## 2019-03-15 RX ORDER — ALLOPURINOL 300 MG/1
300 TABLET ORAL DAILY
Qty: 30 TABLET | Refills: 5 | Status: SHIPPED | OUTPATIENT
Start: 2019-03-15 | End: 2019-06-14 | Stop reason: SDUPTHER

## 2019-03-15 RX ORDER — GABAPENTIN 300 MG/1
300 CAPSULE ORAL 2 TIMES DAILY
Qty: 60 CAPSULE | Refills: 5 | Status: SHIPPED | OUTPATIENT
Start: 2019-03-15 | End: 2019-06-14 | Stop reason: SDUPTHER

## 2019-03-15 RX ORDER — DEXTROAMPHETAMINE SACCHARATE, AMPHETAMINE ASPARTATE, DEXTROAMPHETAMINE SULFATE AND AMPHETAMINE SULFATE 7.5; 7.5; 7.5; 7.5 MG/1; MG/1; MG/1; MG/1
TABLET ORAL
Qty: 60 TABLET | Refills: 0 | Status: SHIPPED | OUTPATIENT
Start: 2019-04-15 | End: 2019-06-14 | Stop reason: ALTCHOICE

## 2019-03-15 NOTE — PROGRESS NOTES
Pt is a 54 y.o. female who presents for check up for   Encounter Diagnoses   Name Primary?    Attention deficit hyperactivity disorder (ADHD), unspecified ADHD type Yes    Attention deficit hyperactivity disorder (ADHD), predominantly inattentive type     Sciatica, unspecified laterality     Spinal stenosis of lumbar region with neurogenic claudication     Lumbar facet arthropathy     Benign essential HTN     GIOVANA (generalized anxiety disorder)     Idiopathic chronic gout of right foot without tophus     Gastroesophageal reflux disease with esophagitis    . Doing well on current meds. Denies any side effects. Prevention is up to date.    History of present illness:   Sylvain Robison is a 54 y.o. female here for a checkup.  She lost her health insurance.  She is now getting most of her care at [facility].  She has a history of hypertension She was started on chlorthalidone 25 mg every morning.  She tolerates it well.  Her blood pressure is better.  She is losing weight.  Patient has a history of gout in her right foot.  She's taking colchicine, allopurinol.  Her foot pain has improved.  She is doing very well on Adderall.  She is not having trouble sleeping. She is Handling stress at home very well.  She is not having side effects from the medication such as palpitations, anxiety attacks.  She is able to focus and stay on track and get projects finished.  Her  is being treated for lymphoma recurrence and is scheduled for bone marrow transplant.  There is a lot of stress at home.   Complains of neck pain with pain and numbness radiating into her right shoulder.  Comes and goes.  Gabapentin and Anaprox helps.    X-ray was reviewed.  She has facet joint hypertrophy and loss of disc space at C5-6.  She also takes gabapentin which helps but it makes her sleepy.  She takes it twice daily.  Having sciatica pain in left leg.  Tried neurontin which helped.  MRI shows lumbar facet arthropathy, mild lumbar spinal  stenosis.  Gabapentin has been very effective.  She is not taking her Celebrex.  She gets a lot of pain in her left hip and leg at night.  Tramadol usually helps but not lately.  She received epidural sterile injections which helped a little.  Lately she has been having pain in her right foot.    Review of systems:  Gen.: No weight loss  HEENT: No headaches, sinus congestion, change in vision  Cardiovascular: No chest pain, palpitations  Respiratory: No cough, shortness of breath  Neurological: Sleeping well, no weakness, no syncope, normal memory, no seizure, no ticks  MSK:  Leg cramps    Physical exam:   Vitals:    03/15/19 1249   BP: (!) 146/86   Pulse: 88   Resp: 16     Gen.: Well developed, well nourished white female in no apparent distress  HEENT: Pupils equal round reactive to light and accommodation, extraocular muscles intact, TMs are normal translucent mobile, neck is supple no lymphadenopathy no JVD, throat is clear.  Neck exam: Good range of motion.  Mild Spurling sign.  Cracking palpable.  Upper extremity strength equal and strong.  Upper extremity reflexes 2+ bilaterally   Heart:Cardiac exam revealed the PMI to be normally situated and sized. The rhythm was regular and no extrasystoles were noted during several minutes of auscultation. The first and second heart sounds were normal and physiologic splitting of the second heart sound was noted. There were no murmurs, rubs, clicks, or gallops.  Lungs:Lungs were clear to auscultation and percussion, and with normal diaphragmatic excursion. No wheezes or rales were noted.   Neuro: Neurologically, the patient was awake, alert, and oriented to person, place and time. There were no obvious focal neurologic abnormalities.  Extremities: No clubbing cyanosis or edema.  Right foot swollen and tender  Back: Moderate tenderness and spasm the lumbar region, negative straight leg raise, DTRs are 2+ and equal in knees, Absent in ankles.  Strength in the legs is 5+  equally.  Scoliosis is present.  Straight leg raise did cause lumbar pain and pain into left leg.  Subjective numbness in left lower leg.  Right foot is tender over the dorsum.  She seems to have some metatarsalgia.  Both thumb joints are tender.    Lab Results   Component Value Date    LDLCALC 136.8 05/02/2018     BMP  Lab Results   Component Value Date     05/02/2018    K 3.6 05/02/2018     05/02/2018    CO2 28 05/02/2018    BUN 17 05/02/2018    CREATININE 0.9 05/02/2018    CALCIUM 9.7 05/02/2018    ANIONGAP 12 05/02/2018    ESTGFRAFRICA >60.0 05/02/2018    EGFRNONAA >60.0 05/02/2018       Lab Results   Component Value Date    URICACID 3.8 12/20/2017     Assessment/plan:     Attention deficit hyperactivity disorder (ADHD), unspecified ADHD type  -     dextroamphetamine-amphetamine 30 mg Tab; TAKE 1 TABLET BY MOUTH THE MORNING AND ONE AT NOON  Dispense: 60 tablet; Refill: 0  -     dextroamphetamine-amphetamine 30 mg Tab; TAKE 1 TABLET BY MOUTH THE MORNING AND ONE AT NOON  Dispense: 60 tablet; Refill: 0  -     dextroamphetamine-amphetamine 30 mg Tab; TAKE 1 TABLET BY MOUTH THE MORNING AND ONE AT NOON  Dispense: 60 tablet; Refill: 0    Attention deficit hyperactivity disorder (ADHD), predominantly inattentive type  -     dextroamphetamine-amphetamine 30 mg Tab; TAKE 1 TABLET BY MOUTH THE MORNING AND ONE AT NOON  Dispense: 60 tablet; Refill: 0  -     dextroamphetamine-amphetamine 30 mg Tab; TAKE 1 TABLET BY MOUTH THE MORNING AND ONE AT NOON  Dispense: 60 tablet; Refill: 0  -     dextroamphetamine-amphetamine 30 mg Tab; TAKE 1 TABLET BY MOUTH THE MORNING AND ONE AT NOON  Dispense: 60 tablet; Refill: 0    Sciatica, unspecified laterality    Spinal stenosis of lumbar region with neurogenic claudication  -     gabapentin (NEURONTIN) 300 MG capsule; Take 1 capsule (300 mg total) by mouth 2 (two) times daily.  Dispense: 60 capsule; Refill: 5    Lumbar facet arthropathy    Benign essential HTN  -      chlorthalidone (HYGROTEN) 25 MG Tab; TAKE 1 TABLET BY MOUTH ONCE DAILY  Dispense: 30 tablet; Refill: 2    GIOVANA (generalized anxiety disorder)  -     ALPRAZolam (XANAX) 0.5 MG tablet; Take 1 tablet (0.5 mg total) by mouth 2 (two) times daily as needed.  Dispense: 60 tablet; Refill: 5    Idiopathic chronic gout of right foot without tophus  -     allopurinol (ZYLOPRIM) 300 MG tablet; Take 1 tablet (300 mg total) by mouth once daily.  Dispense: 30 tablet; Refill: 5    Gastroesophageal reflux disease with esophagitis  -     pantoprazole (PROTONIX) 40 MG tablet; TAKE 1 TABLET BY MOUTH ONCE DAILY  Dispense: 30 tablet; Refill: 5      Attention deficit hyperactivity disorder - stable on current medication  The current medication will be changed to Adderall  15 minute discussion with the patient regarding the importance of proper sleep hygiene was completed. The patientwill be encouraged to go to bed at the same time and wake up at the same time even on the weekends. The patient was encouraged to continue giving the medication even on weekends and holidays.    Cervical spondylolysis.    Continue mobic and gabapentin.     Lumbar facet arthropathy/moderate lumbar spinal stenosis on MRI/left sciatic pain/Scoliosis  Celebrex  Gabapentin  Tramadol prn pain and Motrin 600 mg po tid prn  Continue gabapentin    Cervical spondylarthritis  Celebrex  Tramadol as needed    HTN (hypertension)  Two gram sodium diet.    Weight loss discussed.    Try to walk 2 miles per day.    Quit smoking.    Current medications will be:  Chlorthalidone (HYGROTEN) 25 MG Tab; Take 1 tablet (25 mg total) by mouth once daily.    Hypokalemia  -     potassium chloride SA (K-DUR,KLOR-CON) 20 MEQ tablet; Take 1 tablet (20 mEq total) by mouth 2 (two) times daily.  Dispense: 60 tablet; Refill: 2    Attention deficit hyperactivity disorder (ADHD), unspecified ADHD type  -     dextroamphetamine-amphetamine (ADDERALL) 30 mg Tab; 1 po q am, noon  Dispense: 60 tablet;  Refill: 0    Benign essential HTN  -     chlorthalidone (HYGROTEN) 25 MG Tab; Take 1 tablet (25 mg total) by mouth once daily.  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  Dispense: 30 tablet; Refill: 0  -     colchicine (COLCRYS) 0.6 mg tablet; Take 1 tablet (0.6 mg total) by mouth once daily.  Dispense: 30 tablet; Refill: 5      The next appointment will be every 3 months.

## 2019-04-28 DIAGNOSIS — M47.816 LUMBAR FACET ARTHROPATHY: ICD-10-CM

## 2019-04-28 DIAGNOSIS — M48.062 SPINAL STENOSIS OF LUMBAR REGION WITH NEUROGENIC CLAUDICATION: ICD-10-CM

## 2019-04-29 RX ORDER — TRAMADOL HYDROCHLORIDE 50 MG/1
50 TABLET ORAL EVERY 6 HOURS PRN
Qty: 30 TABLET | Refills: 2 | Status: SHIPPED | OUTPATIENT
Start: 2019-04-29 | End: 2019-05-09

## 2019-06-12 DIAGNOSIS — I10 BENIGN ESSENTIAL HTN: ICD-10-CM

## 2019-06-12 RX ORDER — CHLORTHALIDONE 25 MG/1
25 TABLET ORAL DAILY
Qty: 90 TABLET | Refills: 1 | Status: SHIPPED | OUTPATIENT
Start: 2019-06-12 | End: 2019-06-14 | Stop reason: SDUPTHER

## 2019-06-14 ENCOUNTER — OFFICE VISIT (OUTPATIENT)
Dept: FAMILY MEDICINE | Facility: CLINIC | Age: 54
End: 2019-06-14

## 2019-06-14 VITALS
BODY MASS INDEX: 31.62 KG/M2 | WEIGHT: 171.81 LBS | HEART RATE: 80 BPM | DIASTOLIC BLOOD PRESSURE: 90 MMHG | RESPIRATION RATE: 16 BRPM | HEIGHT: 62 IN | SYSTOLIC BLOOD PRESSURE: 134 MMHG

## 2019-06-14 DIAGNOSIS — F90.9 ATTENTION DEFICIT HYPERACTIVITY DISORDER (ADHD), UNSPECIFIED ADHD TYPE: ICD-10-CM

## 2019-06-14 DIAGNOSIS — Z12.11 COLON CANCER SCREENING: ICD-10-CM

## 2019-06-14 DIAGNOSIS — F90.0 ATTENTION DEFICIT HYPERACTIVITY DISORDER (ADHD), PREDOMINANTLY INATTENTIVE TYPE: ICD-10-CM

## 2019-06-14 DIAGNOSIS — M1A.0710 IDIOPATHIC CHRONIC GOUT OF RIGHT FOOT WITHOUT TOPHUS: ICD-10-CM

## 2019-06-14 DIAGNOSIS — M48.062 SPINAL STENOSIS OF LUMBAR REGION WITH NEUROGENIC CLAUDICATION: ICD-10-CM

## 2019-06-14 DIAGNOSIS — F41.1 GAD (GENERALIZED ANXIETY DISORDER): ICD-10-CM

## 2019-06-14 DIAGNOSIS — K21.00 GASTROESOPHAGEAL REFLUX DISEASE WITH ESOPHAGITIS: ICD-10-CM

## 2019-06-14 DIAGNOSIS — G25.81 RESTLESS LEGS SYNDROME: Primary | ICD-10-CM

## 2019-06-14 DIAGNOSIS — I10 BENIGN ESSENTIAL HTN: ICD-10-CM

## 2019-06-14 PROCEDURE — 99999 PR PBB SHADOW E&M-EST. PATIENT-LVL III: CPT | Mod: PBBFAC,,, | Performed by: FAMILY MEDICINE

## 2019-06-14 PROCEDURE — 99999 PR PBB SHADOW E&M-EST. PATIENT-LVL III: ICD-10-PCS | Mod: PBBFAC,,, | Performed by: FAMILY MEDICINE

## 2019-06-14 PROCEDURE — 99212 OFFICE O/P EST SF 10 MIN: CPT | Mod: S$PBB,,, | Performed by: FAMILY MEDICINE

## 2019-06-14 PROCEDURE — 99212 PR OFFICE/OUTPT VISIT, EST, LEVL II, 10-19 MIN: ICD-10-PCS | Mod: S$PBB,,, | Performed by: FAMILY MEDICINE

## 2019-06-14 PROCEDURE — 99213 OFFICE O/P EST LOW 20 MIN: CPT | Mod: PBBFAC | Performed by: FAMILY MEDICINE

## 2019-06-14 RX ORDER — ALLOPURINOL 300 MG/1
300 TABLET ORAL DAILY
Qty: 30 TABLET | Refills: 5 | Status: SHIPPED | OUTPATIENT
Start: 2019-06-14 | End: 2019-12-05 | Stop reason: SDUPTHER

## 2019-06-14 RX ORDER — ALPRAZOLAM 0.5 MG/1
0.5 TABLET ORAL 2 TIMES DAILY PRN
Qty: 60 TABLET | Refills: 5 | Status: SHIPPED | OUTPATIENT
Start: 2019-06-14 | End: 2019-12-13 | Stop reason: SDUPTHER

## 2019-06-14 RX ORDER — DEXTROAMPHETAMINE SACCHARATE, AMPHETAMINE ASPARTATE, DEXTROAMPHETAMINE SULFATE AND AMPHETAMINE SULFATE 7.5; 7.5; 7.5; 7.5 MG/1; MG/1; MG/1; MG/1
TABLET ORAL
Qty: 60 TABLET | Refills: 0 | Status: SHIPPED | OUTPATIENT
Start: 2019-06-20 | End: 2019-09-16 | Stop reason: ALTCHOICE

## 2019-06-14 RX ORDER — DEXTROAMPHETAMINE SACCHARATE, AMPHETAMINE ASPARTATE, DEXTROAMPHETAMINE SULFATE AND AMPHETAMINE SULFATE 7.5; 7.5; 7.5; 7.5 MG/1; MG/1; MG/1; MG/1
TABLET ORAL
Qty: 60 TABLET | Refills: 0 | Status: SHIPPED | OUTPATIENT
Start: 2019-07-19 | End: 2019-08-26 | Stop reason: SDUPTHER

## 2019-06-14 RX ORDER — DEXTROAMPHETAMINE SACCHARATE, AMPHETAMINE ASPARTATE, DEXTROAMPHETAMINE SULFATE AND AMPHETAMINE SULFATE 7.5; 7.5; 7.5; 7.5 MG/1; MG/1; MG/1; MG/1
TABLET ORAL
Qty: 60 TABLET | Refills: 0 | Status: SHIPPED | OUTPATIENT
Start: 2019-08-17 | End: 2019-09-16

## 2019-06-14 RX ORDER — GABAPENTIN 300 MG/1
300 CAPSULE ORAL 2 TIMES DAILY
Qty: 60 CAPSULE | Refills: 5 | Status: SHIPPED | OUTPATIENT
Start: 2019-06-14 | End: 2019-12-13 | Stop reason: SDUPTHER

## 2019-06-14 RX ORDER — PANTOPRAZOLE SODIUM 40 MG/1
TABLET, DELAYED RELEASE ORAL
Qty: 30 TABLET | Refills: 5 | Status: SHIPPED | OUTPATIENT
Start: 2019-06-14 | End: 2019-12-13 | Stop reason: SDUPTHER

## 2019-06-14 RX ORDER — CHLORTHALIDONE 25 MG/1
25 TABLET ORAL DAILY
Qty: 90 TABLET | Refills: 1 | Status: SHIPPED | OUTPATIENT
Start: 2019-06-14 | End: 2019-09-16

## 2019-06-14 RX ORDER — PRAMIPEXOLE DIHYDROCHLORIDE 0.25 MG/1
0.5 TABLET ORAL NIGHTLY
Qty: 60 TABLET | Refills: 5 | Status: SHIPPED | OUTPATIENT
Start: 2019-06-14 | End: 2019-12-13 | Stop reason: SDUPTHER

## 2019-06-14 NOTE — PROGRESS NOTES
Pt is a 54 y.o. female who presents for check up for   Encounter Diagnoses   Name Primary?    Idiopathic chronic gout of right foot without tophus     GIOVANA (generalized anxiety disorder)     Benign essential HTN     Spinal stenosis of lumbar region with neurogenic claudication     Gastroesophageal reflux disease with esophagitis     Attention deficit hyperactivity disorder (ADHD), unspecified ADHD type     Attention deficit hyperactivity disorder (ADHD), predominantly inattentive type     Restless legs syndrome Yes   . Doing well on current meds. Denies any side effects. Prevention is up to date.    History of present illness:   Sylvain Robison is a 54 y.o. female here for a checkup.  She lost her health insurance.  She is now getting most of her care at [facility].  She has a history of hypertension She was started on chlorthalidone 25 mg every morning.  She tolerates it well.  Her blood pressure is better.  She is losing weight.  Patient has a history of gout in her right foot.  She's taking colchicine, allopurinol.  Her foot pain has improved.  She is doing very well on Adderall.  She is not having trouble sleeping. She is Handling stress at home very well.  She is not having side effects from the medication such as palpitations, anxiety attacks.  She is able to focus and stay on track and get projects finished.  Her  is being treated for lymphoma recurrence and is scheduled for bone marrow transplant.  There is a lot of stress at home.   Complains of neck pain with pain and numbness radiating into her right shoulder.  Comes and goes.  Gabapentin and Anaprox helps.    X-ray was reviewed.  She has facet joint hypertrophy and loss of disc space at C5-6.  She also takes gabapentin which helps but it makes her sleepy.  She takes it twice daily.  Having sciatica pain in left leg.  Tried neurontin which helped.  MRI shows lumbar facet arthropathy, mild lumbar spinal stenosis.  Gabapentin has been very  effective.  She is not taking her Celebrex.  She gets a lot of pain in her left hip and leg at night.  Tramadol usually helps but not lately.  She received epidural sterile injections which helped a little.  Lately she has been having pain in her right foot.  Having restless leg issues at night.    Review of systems:  Gen.: No weight loss  HEENT: No headaches, sinus congestion, change in vision  Cardiovascular: No chest pain, palpitations  Respiratory: No cough, shortness of breath  Neurological: Sleeping well, no weakness, no syncope, normal memory, no seizure, no ticks  MSK:  Leg cramps    Physical exam:   Vitals:    06/14/19 1257   BP: (!) 134/90   Pulse: 80   Resp: 16     Gen.: Well developed, well nourished white female in no apparent distress  HEENT: Pupils equal round reactive to light and accommodation, extraocular muscles intact, TMs are normal translucent mobile, neck is supple no lymphadenopathy no JVD, throat is clear.  Neck exam: Good range of motion.  Mild Spurling sign.  Cracking palpable.  Upper extremity strength equal and strong.  Upper extremity reflexes 2+ bilaterally   Heart:Cardiac exam revealed the PMI to be normally situated and sized. The rhythm was regular and no extrasystoles were noted during several minutes of auscultation. The first and second heart sounds were normal and physiologic splitting of the second heart sound was noted. There were no murmurs, rubs, clicks, or gallops.  Lungs:Lungs were clear to auscultation and percussion, and with normal diaphragmatic excursion. No wheezes or rales were noted.   Neuro: Neurologically, the patient was awake, alert, and oriented to person, place and time. There were no obvious focal neurologic abnormalities.  Extremities: No clubbing cyanosis or edema.  Right foot swollen and tender  Back: Moderate tenderness and spasm the lumbar region, negative straight leg raise, DTRs are 2+ and equal in knees, Absent in ankles.  Strength in the legs is 5+  equally.  Scoliosis is present.  Straight leg raise did cause lumbar pain and pain into left leg.  Subjective numbness in left lower leg.  Right foot is tender over the dorsum.  She seems to have some metatarsalgia.  Both thumb joints are tender.    Lab Results   Component Value Date    LDLCALC 136.8 05/02/2018     BMP  Lab Results   Component Value Date     05/02/2018    K 3.6 05/02/2018     05/02/2018    CO2 28 05/02/2018    BUN 17 05/02/2018    CREATININE 0.9 05/02/2018    CALCIUM 9.7 05/02/2018    ANIONGAP 12 05/02/2018    ESTGFRAFRICA >60.0 05/02/2018    EGFRNONAA >60.0 05/02/2018       Lab Results   Component Value Date    URICACID 3.8 12/20/2017     Assessment/plan:     Restless legs syndrome  -     pramipexole (MIRAPEX) 0.25 MG tablet; Take 2 tablets (0.5 mg total) by mouth every evening.  Dispense: 60 tablet; Refill: 5    Idiopathic chronic gout of right foot without tophus  -     allopurinol (ZYLOPRIM) 300 MG tablet; Take 1 tablet (300 mg total) by mouth once daily.  Dispense: 30 tablet; Refill: 5    GIOVANA (generalized anxiety disorder)  -     ALPRAZolam (XANAX) 0.5 MG tablet; Take 1 tablet (0.5 mg total) by mouth 2 (two) times daily as needed.  Dispense: 60 tablet; Refill: 5    Benign essential HTN  -     chlorthalidone (HYGROTEN) 25 MG Tab; TAKE 1 TABLET BY MOUTH ONCE DAILY  Dispense: 90 tablet; Refill: 1    Spinal stenosis of lumbar region with neurogenic claudication  -     gabapentin (NEURONTIN) 300 MG capsule; Take 1 capsule (300 mg total) by mouth 2 (two) times daily.  Dispense: 60 capsule; Refill: 5    Gastroesophageal reflux disease with esophagitis  -     pantoprazole (PROTONIX) 40 MG tablet; TAKE 1 TABLET BY MOUTH ONCE DAILY  Dispense: 30 tablet; Refill: 5    Attention deficit hyperactivity disorder (ADHD), unspecified ADHD type  -     dextroamphetamine-amphetamine 30 mg Tab; TAKE 1 TABLET BY MOUTH IN THE MORNING AND ONE AT NOON  Dispense: 60 tablet; Refill: 0  -      dextroamphetamine-amphetamine 30 mg Tab; TAKE 1 TABLET BY MOUTH IN THE MORNING AND ONE AT NOON  Dispense: 60 tablet; Refill: 0  -     dextroamphetamine-amphetamine 30 mg Tab; TAKE 1 TABLET BY MOUTH IN THE MORNING AND ONE AT NOON  Dispense: 60 tablet; Refill: 0    Attention deficit hyperactivity disorder (ADHD), predominantly inattentive type  -     dextroamphetamine-amphetamine 30 mg Tab; TAKE 1 TABLET BY MOUTH IN THE MORNING AND ONE AT NOON  Dispense: 60 tablet; Refill: 0  -     dextroamphetamine-amphetamine 30 mg Tab; TAKE 1 TABLET BY MOUTH IN THE MORNING AND ONE AT NOON  Dispense: 60 tablet; Refill: 0  -     dextroamphetamine-amphetamine 30 mg Tab; TAKE 1 TABLET BY MOUTH IN THE MORNING AND ONE AT NOON  Dispense: 60 tablet; Refill: 0    Attention deficit hyperactivity disorder - stable on current medication  The current medication will be changed to Adderall  15 minute discussion with the patient regarding the importance of proper sleep hygiene was completed. The patientwill be encouraged to go to bed at the same time and wake up at the same time even on the weekends. The patient was encouraged to continue giving the medication even on weekends and holidays.    Cervical spondylolysis.    Continue mobic and gabapentin.     Lumbar facet arthropathy/moderate lumbar spinal stenosis on MRI/left sciatic pain/Scoliosis  Celebrex  Gabapentin  Tramadol prn pain and Motrin 600 mg po tid prn  Continue gabapentin    Cervical spondylarthritis  Celebrex  Tramadol as needed    HTN (hypertension)  Two gram sodium diet.    Weight loss discussed.    Try to walk 2 miles per day.    Quit smoking.    Current medications will be:  Chlorthalidone (HYGROTEN) 25 MG Tab; Take 1 tablet (25 mg total) by mouth once daily.    Hypokalemia  -     potassium chloride SA (K-DUR,KLOR-CON) 20 MEQ tablet; Take 1 tablet (20 mEq total) by mouth 2 (two) times daily.  Dispense: 60 tablet; Refill: 2    Attention deficit hyperactivity disorder (ADHD),  unspecified ADHD type  -     dextroamphetamine-amphetamine (ADDERALL) 30 mg Tab; 1 po q am, noon  Dispense: 60 tablet; Refill: 0    Benign essential HYPERTENSION  -     chlorthalidone (HYGROTEN) 25 MG Tab; Take 1 tablet (25 mg total) by mouth once daily.  Dispense: 30 tablet; Refill: 5\    Idiopathic chronic gout of right foot without tophus  -     allopurinol (ZYLOPRIM) 300 MG tablet; Take 1 tablet (300 mg total) by mouth once daily.  Dispense: 30 tablet; Refill: 0  -     colchicine (COLCRYS) 0.6 mg tablet; Take 1 tablet (0.6 mg total) by mouth once daily.  Dispense: 30 tablet; Refill: 5    The next appointment will be every 3 months.

## 2019-09-16 ENCOUNTER — OFFICE VISIT (OUTPATIENT)
Dept: FAMILY MEDICINE | Facility: CLINIC | Age: 54
End: 2019-09-16

## 2019-09-16 ENCOUNTER — TELEPHONE (OUTPATIENT)
Dept: FAMILY MEDICINE | Facility: CLINIC | Age: 54
End: 2019-09-16

## 2019-09-16 VITALS
HEIGHT: 62 IN | SYSTOLIC BLOOD PRESSURE: 148 MMHG | BODY MASS INDEX: 31.65 KG/M2 | WEIGHT: 172 LBS | RESPIRATION RATE: 16 BRPM | HEART RATE: 80 BPM | DIASTOLIC BLOOD PRESSURE: 80 MMHG

## 2019-09-16 DIAGNOSIS — F90.0 ATTENTION DEFICIT HYPERACTIVITY DISORDER (ADHD), PREDOMINANTLY INATTENTIVE TYPE: Primary | ICD-10-CM

## 2019-09-16 DIAGNOSIS — F41.9 ANXIETY: ICD-10-CM

## 2019-09-16 DIAGNOSIS — I10 ESSENTIAL HYPERTENSION: Primary | ICD-10-CM

## 2019-09-16 DIAGNOSIS — F41.1 GAD (GENERALIZED ANXIETY DISORDER): ICD-10-CM

## 2019-09-16 DIAGNOSIS — I10 BENIGN ESSENTIAL HTN: ICD-10-CM

## 2019-09-16 PROCEDURE — 99212 PR OFFICE/OUTPT VISIT, EST, LEVL II, 10-19 MIN: ICD-10-PCS | Mod: S$PBB,,, | Performed by: FAMILY MEDICINE

## 2019-09-16 PROCEDURE — 99213 OFFICE O/P EST LOW 20 MIN: CPT | Mod: PBBFAC | Performed by: FAMILY MEDICINE

## 2019-09-16 PROCEDURE — 99999 PR PBB SHADOW E&M-EST. PATIENT-LVL III: CPT | Mod: PBBFAC,,, | Performed by: FAMILY MEDICINE

## 2019-09-16 PROCEDURE — 99999 PR PBB SHADOW E&M-EST. PATIENT-LVL III: ICD-10-PCS | Mod: PBBFAC,,, | Performed by: FAMILY MEDICINE

## 2019-09-16 PROCEDURE — 99212 OFFICE O/P EST SF 10 MIN: CPT | Mod: S$PBB,,, | Performed by: FAMILY MEDICINE

## 2019-09-16 RX ORDER — HYDROCHLOROTHIAZIDE 25 MG/1
25 TABLET ORAL DAILY
Qty: 30 TABLET | Refills: 11 | Status: SHIPPED | OUTPATIENT
Start: 2019-09-16 | End: 2019-12-13 | Stop reason: SDUPTHER

## 2019-09-16 RX ORDER — DEXTROAMPHETAMINE SACCHARATE, AMPHETAMINE ASPARTATE, DEXTROAMPHETAMINE SULFATE AND AMPHETAMINE SULFATE 7.5; 7.5; 7.5; 7.5 MG/1; MG/1; MG/1; MG/1
TABLET ORAL
Qty: 60 TABLET | Refills: 0 | Status: SHIPPED | OUTPATIENT
Start: 2019-11-22 | End: 2019-12-13 | Stop reason: SDUPTHER

## 2019-09-16 RX ORDER — DEXTROAMPHETAMINE SACCHARATE, AMPHETAMINE ASPARTATE, DEXTROAMPHETAMINE SULFATE AND AMPHETAMINE SULFATE 7.5; 7.5; 7.5; 7.5 MG/1; MG/1; MG/1; MG/1
TABLET ORAL
Qty: 60 TABLET | Refills: 0 | Status: SHIPPED | OUTPATIENT
Start: 2019-09-24 | End: 2019-09-24 | Stop reason: SDUPTHER

## 2019-09-16 RX ORDER — DEXTROAMPHETAMINE SACCHARATE, AMPHETAMINE ASPARTATE, DEXTROAMPHETAMINE SULFATE AND AMPHETAMINE SULFATE 7.5; 7.5; 7.5; 7.5 MG/1; MG/1; MG/1; MG/1
TABLET ORAL
Qty: 60 TABLET | Refills: 0 | Status: SHIPPED | OUTPATIENT
Start: 2019-10-23 | End: 2019-12-13 | Stop reason: SDUPTHER

## 2019-09-16 NOTE — TELEPHONE ENCOUNTER
Pt seen today.     Spoke with Shante from [facility]. She said the hctz would be cheaper than hygroten. Please advise, thank you

## 2019-09-16 NOTE — PROGRESS NOTES
Pt is a 54 y.o. female who presents for check up for   Encounter Diagnoses   Name Primary?    Attention deficit hyperactivity disorder (ADHD), predominantly inattentive type Yes    GIOVANA (generalized anxiety disorder)     Anxiety     Benign essential HTN    . Doing well on current meds. Denies any side effects. Prevention is up to date.    History of present illness:   Sylvain Robison is a 54 y.o. female here for a checkup.  She lost her health insurance.  She is now getting most of her care at [facility].  She has a history of hypertension She was started on chlorthalidone 25 mg every morning.  She tolerates it well.  Her blood pressure is better.  She is losing weight.  Patient has a history of gout in her right foot.  She's taking colchicine, allopurinol.  Her foot pain has improved.  She is doing very well on Adderall.  She is not having trouble sleeping. She is Handling stress at home very well.  She is not having side effects from the medication such as palpitations, anxiety attacks.  She is able to focus and stay on track and get projects finished.  Her  is being treated for lymphoma recurrence and is scheduled for bone marrow transplant.  There is a lot of stress at home.   Complains of neck pain with pain and numbness radiating into her right shoulder.  Comes and goes.  Gabapentin and Anaprox helps.    X-ray was reviewed.  She has facet joint hypertrophy and loss of disc space at C5-6.  She also takes gabapentin which helps but it makes her sleepy.  She takes it twice daily.  Having sciatica pain in left leg.  Tried neurontin which helped.  MRI shows lumbar facet arthropathy, mild lumbar spinal stenosis.  Gabapentin has been very effective.  She is not taking her Celebrex.  She gets a lot of pain in her left hip and leg at night.  Tramadol usually helps but not lately.  She received epidural sterile injections which helped a little.  Lately she has been having pain in her right foot.  Having restless  leg issues at night.    Review of systems:  Gen.: No weight loss  HEENT: No headaches, sinus congestion, change in vision  Cardiovascular: No chest pain, palpitations  Respiratory: No cough, shortness of breath  Neurological: Sleeping well, no weakness, no syncope, normal memory, no seizure, no ticks  MSK:  Leg cramps    Physical exam:   Vitals:    09/16/19 1558   BP: (!) 148/80   Pulse: 80   Resp: 16     Gen.: Well developed, well nourished white female in no apparent distress  HEENT: Pupils equal round reactive to light and accommodation, extraocular muscles intact, TMs are normal translucent mobile, neck is supple no lymphadenopathy no JVD, throat is clear.  Neck exam: Good range of motion.  Mild Spurling sign.  Cracking palpable.  Upper extremity strength equal and strong.  Upper extremity reflexes 2+ bilaterally   Heart:Cardiac exam revealed the PMI to be normally situated and sized. The rhythm was regular and no extrasystoles were noted during several minutes of auscultation. The first and second heart sounds were normal and physiologic splitting of the second heart sound was noted. There were no murmurs, rubs, clicks, or gallops.  Lungs:Lungs were clear to auscultation and percussion, and with normal diaphragmatic excursion. No wheezes or rales were noted.   Neuro: Neurologically, the patient was awake, alert, and oriented to person, place and time. There were no obvious focal neurologic abnormalities.  Extremities: No clubbing cyanosis or edema.  Right foot swollen and tender  Back: Moderate tenderness and spasm the lumbar region, negative straight leg raise, DTRs are 2+ and equal in knees, Absent in ankles.  Strength in the legs is 5+ equally.  Scoliosis is present.  Straight leg raise did cause lumbar pain and pain into left leg.  Subjective numbness in left lower leg.  Right foot is tender over the dorsum.  She seems to have some metatarsalgia.  Both thumb joints are tender.    Lab Results   Component  Value Date    LDLCALC 136.8 05/02/2018     BMP  Lab Results   Component Value Date     05/02/2018    K 3.6 05/02/2018     05/02/2018    CO2 28 05/02/2018    BUN 17 05/02/2018    CREATININE 0.9 05/02/2018    CALCIUM 9.7 05/02/2018    ANIONGAP 12 05/02/2018    ESTGFRAFRICA >60.0 05/02/2018    EGFRNONAA >60.0 05/02/2018       Lab Results   Component Value Date    URICACID 3.8 12/20/2017     Assessment/plan:     Attention deficit hyperactivity disorder (ADHD), predominantly inattentive type  -     dextroamphetamine-amphetamine 30 mg Tab; TAKE 1 TABLET BY MOUTH IN THE MORNING AND ONE AT NOON  Dispense: 60 tablet; Refill: 0  -     dextroamphetamine-amphetamine (ADDERALL) 30 mg Tab; TAKE ONE TABLET BY MOUTH IN THE MORNING AND ONE TABLET AT NOON  Dispense: 60 tablet; Refill: 0  -     dextroamphetamine-amphetamine 30 mg Tab; TAKE 1 TABLET BY MOUTH IN THE MORNING AND ONE AT NOON  Dispense: 60 tablet; Refill: 0    GIOVANA (generalized anxiety disorder)    Anxiety    Benign essential HTN    Attention deficit hyperactivity disorder - stable on current medication  The current medication will be changed to Adderall  15 minute discussion with the patient regarding the importance of proper sleep hygiene was completed. The patientwill be encouraged to go to bed at the same time and wake up at the same time even on the weekends. The patient was encouraged to continue giving the medication even on weekends and holidays.    Cervical spondylolysis.    Continue mobic and gabapentin.     Lumbar facet arthropathy/moderate lumbar spinal stenosis on MRI/left sciatic pain/Scoliosis  Celebrex  Gabapentin  Motrin 600 mg po tid prn  Continue gabapentin    Cervical spondylarthritis  Celebrex  Tramadol as needed    HTN (hypertension)  Two gram sodium diet.    Weight loss discussed.    Try to walk 2 miles per day.    Quit smoking.    Current medications will be:  Chlorthalidone (HYGROTEN) 25 MG Tab; Take 1 tablet (25 mg total) by mouth once  daily.    Hypokalemia  -     potassium chloride SA (K-DUR,KLOR-CON) 20 MEQ tablet; Take 1 tablet (20 mEq total) by mouth 2 (two) times daily.  Dispense: 60 tablet; Refill: 2    Attention deficit hyperactivity disorder (ADHD), unspecified ADHD type  -     dextroamphetamine-amphetamine (ADDERALL) 30 mg Tab; 1 po q am, noon  Dispense: 60 tablet; Refill: 0    Benign essential HYPERTENSION  -     chlorthalidone (HYGROTEN) 25 MG Tab; Take 1 tablet (25 mg total) by mouth once daily.  Dispense: 30 tablet; Refill: 5\    Idiopathic chronic gout of right foot without tophus  -     allopurinol (ZYLOPRIM) 300 MG tablet; Take 1 tablet (300 mg total) by mouth once daily.  Dispense: 30 tablet; Refill: 0  -     colchicine (COLCRYS) 0.6 mg tablet; Take 1 tablet (0.6 mg total) by mouth once daily.  Dispense: 30 tablet; Refill: 5    The next appointment will be every 3 months.

## 2019-09-24 DIAGNOSIS — F90.0 ATTENTION DEFICIT HYPERACTIVITY DISORDER (ADHD), PREDOMINANTLY INATTENTIVE TYPE: ICD-10-CM

## 2019-09-24 RX ORDER — DEXTROAMPHETAMINE SACCHARATE, AMPHETAMINE ASPARTATE, DEXTROAMPHETAMINE SULFATE AND AMPHETAMINE SULFATE 7.5; 7.5; 7.5; 7.5 MG/1; MG/1; MG/1; MG/1
TABLET ORAL
Qty: 60 TABLET | Refills: 0 | Status: SHIPPED | OUTPATIENT
Start: 2019-09-24 | End: 2019-12-13 | Stop reason: SDUPTHER

## 2019-09-24 NOTE — TELEPHONE ENCOUNTER
----- Message from Sylvain Villar sent at 2019  1:20 PM CDT -----  Contact: Patient  Sylvain Robison  MRN: [id number]  : 1965  PCP: Diego Day  Home Phone      [phone]  Work Phone      Not on file.  Mobile          [phone]      MESSAGE: Would like Rx for Adderall 30 mg  that was sent to [facility] cancelled -- she needs it sent to [facility] in [place] (No-insurance & can get it cheaper there)    Call [phone]    PCP: Barb

## 2019-10-08 ENCOUNTER — TELEPHONE (OUTPATIENT)
Dept: FAMILY MEDICINE | Facility: CLINIC | Age: 54
End: 2019-10-08

## 2019-12-05 DIAGNOSIS — M1A.0710 IDIOPATHIC CHRONIC GOUT OF RIGHT FOOT WITHOUT TOPHUS: ICD-10-CM

## 2019-12-05 NOTE — TELEPHONE ENCOUNTER
LOV: 9/16/2019    Pharmacy requesting refill for Allopurinol 300 mg    Pharmacy: [facility]

## 2019-12-06 RX ORDER — ALLOPURINOL 300 MG/1
300 TABLET ORAL DAILY
Qty: 30 TABLET | Refills: 5 | Status: SHIPPED | OUTPATIENT
Start: 2019-12-06 | End: 2020-06-12 | Stop reason: SDUPTHER

## 2019-12-13 ENCOUNTER — OFFICE VISIT (OUTPATIENT)
Dept: FAMILY MEDICINE | Facility: CLINIC | Age: 54
End: 2019-12-13

## 2019-12-13 VITALS
DIASTOLIC BLOOD PRESSURE: 80 MMHG | RESPIRATION RATE: 16 BRPM | BODY MASS INDEX: 31.6 KG/M2 | HEART RATE: 96 BPM | WEIGHT: 171.75 LBS | HEIGHT: 62 IN | SYSTOLIC BLOOD PRESSURE: 130 MMHG

## 2019-12-13 DIAGNOSIS — F90.0 ATTENTION DEFICIT HYPERACTIVITY DISORDER (ADHD), PREDOMINANTLY INATTENTIVE TYPE: ICD-10-CM

## 2019-12-13 DIAGNOSIS — F41.1 GAD (GENERALIZED ANXIETY DISORDER): ICD-10-CM

## 2019-12-13 DIAGNOSIS — M1A.9XX0 CHRONIC GOUT WITHOUT TOPHUS, UNSPECIFIED CAUSE, UNSPECIFIED SITE: ICD-10-CM

## 2019-12-13 DIAGNOSIS — K21.00 GASTROESOPHAGEAL REFLUX DISEASE WITH ESOPHAGITIS: ICD-10-CM

## 2019-12-13 DIAGNOSIS — I10 ESSENTIAL HYPERTENSION: ICD-10-CM

## 2019-12-13 DIAGNOSIS — G25.81 RESTLESS LEGS SYNDROME: ICD-10-CM

## 2019-12-13 DIAGNOSIS — M47.816 LUMBAR FACET ARTHROPATHY: Primary | ICD-10-CM

## 2019-12-13 DIAGNOSIS — M48.062 SPINAL STENOSIS OF LUMBAR REGION WITH NEUROGENIC CLAUDICATION: ICD-10-CM

## 2019-12-13 PROCEDURE — 99213 OFFICE O/P EST LOW 20 MIN: CPT | Mod: PBBFAC | Performed by: FAMILY MEDICINE

## 2019-12-13 PROCEDURE — 99213 PR OFFICE/OUTPT VISIT, EST, LEVL III, 20-29 MIN: ICD-10-PCS | Mod: S$PBB,,, | Performed by: FAMILY MEDICINE

## 2019-12-13 PROCEDURE — 99213 OFFICE O/P EST LOW 20 MIN: CPT | Mod: S$PBB,,, | Performed by: FAMILY MEDICINE

## 2019-12-13 PROCEDURE — 99999 PR PBB SHADOW E&M-EST. PATIENT-LVL III: ICD-10-PCS | Mod: PBBFAC,,, | Performed by: FAMILY MEDICINE

## 2019-12-13 PROCEDURE — 99999 PR PBB SHADOW E&M-EST. PATIENT-LVL III: CPT | Mod: PBBFAC,,, | Performed by: FAMILY MEDICINE

## 2019-12-13 RX ORDER — HYDROCHLOROTHIAZIDE 25 MG/1
25 TABLET ORAL DAILY
Qty: 30 TABLET | Refills: 5 | Status: SHIPPED | OUTPATIENT
Start: 2019-12-13 | End: 2020-07-14 | Stop reason: SDUPTHER

## 2019-12-13 RX ORDER — GABAPENTIN 300 MG/1
300 CAPSULE ORAL 2 TIMES DAILY
Qty: 60 CAPSULE | Refills: 5 | Status: SHIPPED | OUTPATIENT
Start: 2019-12-13 | End: 2020-07-03 | Stop reason: SDUPTHER

## 2019-12-13 RX ORDER — PRAMIPEXOLE DIHYDROCHLORIDE 0.25 MG/1
0.5 TABLET ORAL NIGHTLY
Qty: 60 TABLET | Refills: 5 | Status: SHIPPED | OUTPATIENT
Start: 2019-12-13 | End: 2020-07-25 | Stop reason: SDUPTHER

## 2019-12-13 RX ORDER — DEXTROAMPHETAMINE SACCHARATE, AMPHETAMINE ASPARTATE, DEXTROAMPHETAMINE SULFATE AND AMPHETAMINE SULFATE 7.5; 7.5; 7.5; 7.5 MG/1; MG/1; MG/1; MG/1
TABLET ORAL
Qty: 60 TABLET | Refills: 0 | Status: SHIPPED | OUTPATIENT
Start: 2020-02-19 | End: 2020-03-24 | Stop reason: SDUPTHER

## 2019-12-13 RX ORDER — ALPRAZOLAM 0.5 MG/1
0.5 TABLET ORAL 2 TIMES DAILY PRN
Qty: 60 TABLET | Refills: 5 | Status: SHIPPED | OUTPATIENT
Start: 2019-12-13 | End: 2020-06-12 | Stop reason: SDUPTHER

## 2019-12-13 RX ORDER — PANTOPRAZOLE SODIUM 40 MG/1
TABLET, DELAYED RELEASE ORAL
Qty: 30 TABLET | Refills: 5 | Status: SHIPPED | OUTPATIENT
Start: 2019-12-13 | End: 2020-06-16 | Stop reason: SDUPTHER

## 2019-12-13 RX ORDER — DEXTROAMPHETAMINE SACCHARATE, AMPHETAMINE ASPARTATE, DEXTROAMPHETAMINE SULFATE AND AMPHETAMINE SULFATE 7.5; 7.5; 7.5; 7.5 MG/1; MG/1; MG/1; MG/1
TABLET ORAL
Qty: 60 TABLET | Refills: 0 | Status: SHIPPED | OUTPATIENT
Start: 2020-01-20 | End: 2020-06-24 | Stop reason: SDUPTHER

## 2019-12-13 RX ORDER — DEXTROAMPHETAMINE SACCHARATE, AMPHETAMINE ASPARTATE, DEXTROAMPHETAMINE SULFATE AND AMPHETAMINE SULFATE 7.5; 7.5; 7.5; 7.5 MG/1; MG/1; MG/1; MG/1
TABLET ORAL
Qty: 60 TABLET | Refills: 0 | Status: SHIPPED | OUTPATIENT
Start: 2020-01-21 | End: 2019-12-23 | Stop reason: SDUPTHER

## 2019-12-13 NOTE — PROGRESS NOTES
Pt is a 54 y.o. female who presents for check up for   Encounter Diagnoses   Name Primary?    Attention deficit hyperactivity disorder (ADHD), predominantly inattentive type     GIOVANA (generalized anxiety disorder)     Essential hypertension     Gastroesophageal reflux disease with esophagitis     Restless legs syndrome     Spinal stenosis of lumbar region with neurogenic claudication     Lumbar facet arthropathy Yes    Chronic gout without tophus, unspecified cause, unspecified site    . Doing well on current meds. Denies any side effects. Prevention is up to date.    History of present illness:   Sylvain Robison is a 54 y.o. female here for a checkup.  She lost her health insurance.  She is now getting most of her care at [facility].  She has a history of hypertension She was started on chlorthalidone 25 mg every morning.  She tolerates it well.  Her blood pressure is better.  She is losing weight.  Patient has a history of gout in her right foot.  She's taking colchicine, allopurinol.  Her foot pain has improved.  She is doing very well on Adderall.  She is not having trouble sleeping. She is Handling stress at home very well.  She is not having side effects from the medication such as palpitations, anxiety attacks.  She is able to focus and stay on track and get projects finished.  Her  is being treated for lymphoma recurrence and is scheduled for bone marrow transplant.  There is a lot of stress at home.   Complains of neck pain with pain and numbness radiating into her right shoulder.  Comes and goes.  Gabapentin and Anaprox helps.    X-ray was reviewed.  She has facet joint hypertrophy and loss of disc space at C5-6.  She also takes gabapentin which helps but it makes her sleepy.  She takes it twice daily.  Having sciatica pain in left leg.  Tried neurontin which helped.  MRI shows lumbar facet arthropathy, mild lumbar spinal stenosis.  Gabapentin has been very effective.  She is not taking her  Celebrex.  She gets a lot of pain in her left hip and leg at night.  Tramadol usually helps but not lately.  She received epidural sterile injections which helped a little.  Lately she has been having pain in her right foot.  Having restless leg issues at night.    Review of systems:  Gen.: No weight loss  HEENT: No headaches, sinus congestion, change in vision.  Sore throat in the morning  Cardiovascular: No chest pain, palpitations  Respiratory: No cough, shortness of breath  Neurological: Sleeping well, no weakness, no syncope, normal memory, no seizure, no ticks  MSK:  Leg cramps    Physical exam:   Vitals:    12/13/19 1504   BP: 130/80   Pulse: 96   Resp: 16     Gen.: Well developed, well nourished white female in no apparent distress  HEENT: Pupils equal round reactive to light and accommodation, extraocular muscles intact, TMs are normal translucent mobile, neck is supple no lymphadenopathy no JVD, throat is clear.  Neck exam: Good range of motion.  Mild Spurling sign.  Cracking palpable.  Upper extremity strength equal and strong.  Upper extremity reflexes 2+ bilaterally   Heart:Cardiac exam revealed the PMI to be normally situated and sized. The rhythm was regular and no extrasystoles were noted during several minutes of auscultation. The first and second heart sounds were normal and physiologic splitting of the second heart sound was noted. There were no murmurs, rubs, clicks, or gallops.  Lungs:Lungs were clear to auscultation and percussion, and with normal diaphragmatic excursion. No wheezes or rales were noted.   Neuro: Neurologically, the patient was awake, alert, and oriented to person, place and time. There were no obvious focal neurologic abnormalities.  Extremities: No clubbing cyanosis or edema.  Right foot swollen and tender  Back: Moderate tenderness and spasm the lumbar region, negative straight leg raise, DTRs are 2+ and equal in knees, Absent in ankles.  Strength in the legs is 5+ equally.   Scoliosis is present.  Straight leg raise did cause lumbar pain and pain into left leg.  Subjective numbness in left lower leg.  Right foot is tender over the dorsum.  She seems to have some metatarsalgia.  Both thumb joints are tender.    Lab Results   Component Value Date    LDLCALC 136.8 05/02/2018     BMP  Lab Results   Component Value Date     05/02/2018    K 3.6 05/02/2018     05/02/2018    CO2 28 05/02/2018    BUN 17 05/02/2018    CREATININE 0.9 05/02/2018    CALCIUM 9.7 05/02/2018    ANIONGAP 12 05/02/2018    ESTGFRAFRICA >60.0 05/02/2018    EGFRNONAA >60.0 05/02/2018       Lab Results   Component Value Date    URICACID 3.8 12/20/2017     Assessment/plan:     Lumbar facet arthropathy    Attention deficit hyperactivity disorder (ADHD), predominantly inattentive type  -     dextroamphetamine-amphetamine 30 mg Tab; TAKE 1 TABLET BY MOUTH IN THE MORNING AND ONE AT NOON  Dispense: 60 tablet; Refill: 0  -     dextroamphetamine-amphetamine 30 mg Tab; TAKE 1 TABLET BY MOUTH IN THE MORNING AND ONE AT NOON  Dispense: 60 tablet; Refill: 0  -     dextroamphetamine-amphetamine (ADDERALL) 30 mg Tab; TAKE ONE TABLET BY MOUTH IN THE MORNING AND ONE TABLET AT NOON  Dispense: 60 tablet; Refill: 0    GIOVANA (generalized anxiety disorder)  -     ALPRAZolam (XANAX) 0.5 MG tablet; Take 1 tablet (0.5 mg total) by mouth 2 (two) times daily as needed.  Dispense: 60 tablet; Refill: 5    Essential hypertension  -     hydroCHLOROthiazide (HYDRODIURIL) 25 MG tablet; Take 1 tablet (25 mg total) by mouth once daily.  Dispense: 30 tablet; Refill: 5    Gastroesophageal reflux disease with esophagitis  -     pantoprazole (PROTONIX) 40 MG tablet; TAKE 1 TABLET BY MOUTH ONCE DAILY  Dispense: 30 tablet; Refill: 5    Restless legs syndrome  -     pramipexole (MIRAPEX) 0.25 MG tablet; Take 2 tablets (0.5 mg total) by mouth every evening.  Dispense: 60 tablet; Refill: 5    Spinal stenosis of lumbar region with neurogenic claudication  -      gabapentin (NEURONTIN) 300 MG capsule; Take 1 capsule (300 mg total) by mouth 2 (two) times daily.  Dispense: 60 capsule; Refill: 5    Chronic gout without tophus, unspecified cause, unspecified site    Attention deficit hyperactivity disorder - stable on current medication  The current medication will be changed to Adderall  15 minute discussion with the patient regarding the importance of proper sleep hygiene was completed. The patientwill be encouraged to go to bed at the same time and wake up at the same time even on the weekends. The patient was encouraged to continue giving the medication even on weekends and holidays.    Cervical spondylolysis.    Continue mobic and gabapentin.     Lumbar facet arthropathy/moderate lumbar spinal stenosis on MRI/left sciatic pain/Scoliosis  Celebrex  Gabapentin  Motrin 600 mg po tid prn  Continue gabapentin    Cervical spondylarthritis  Advil    HTN (hypertension)  Two gram sodium diet.    Weight loss discussed.    Try to walk 2 miles per day.    Quit smoking.    Current medications will be:  Chlorthalidone (HYGROTEN) 25 MG Tab; Take 1 tablet (25 mg total) by mouth once daily.    Hypokalemia  -     potassium chloride SA (K-DUR,KLOR-CON) 20 MEQ tablet; Take 1 tablet (20 mEq total) by mouth 2 (two) times daily.  Dispense: 60 tablet; Refill: 2    Attention deficit hyperactivity disorder (ADHD), unspecified ADHD type  -     dextroamphetamine-amphetamine (ADDERALL) 30 mg Tab; 1 po q am, noon  Dispense: 60 tablet; Refill: 0    Benign essential HYPERTENSION  -     chlorthalidone (HYGROTEN) 25 MG Tab; Take 1 tablet (25 mg total) by mouth once daily.  Dispense: 30 tablet; Refill: 5\    Idiopathic chronic gout of right foot without tophus  -     allopurinol (ZYLOPRIM) 300 MG tablet; Take 1 tablet (300 mg total) by mouth once daily.  Dispense: 30 tablet; Refill: 0  -     colchicine (COLCRYS) 0.6 mg tablet; Take 1 tablet (0.6 mg total) by mouth once daily.  Dispense: 30 tablet; Refill:  5    The next appointment will be every 3 months.

## 2019-12-23 DIAGNOSIS — F90.0 ATTENTION DEFICIT HYPERACTIVITY DISORDER (ADHD), PREDOMINANTLY INATTENTIVE TYPE: ICD-10-CM

## 2019-12-23 RX ORDER — DEXTROAMPHETAMINE SACCHARATE, AMPHETAMINE ASPARTATE, DEXTROAMPHETAMINE SULFATE AND AMPHETAMINE SULFATE 7.5; 7.5; 7.5; 7.5 MG/1; MG/1; MG/1; MG/1
TABLET ORAL
Qty: 60 TABLET | Refills: 0 | Status: SHIPPED | OUTPATIENT
Start: 2019-12-23 | End: 2021-06-16 | Stop reason: SDUPTHER

## 2019-12-23 NOTE — TELEPHONE ENCOUNTER
----- Message from Anu Soares sent at 2019  1:51 PM CST -----  Contact: self  Sylvain Robison  MRN: [id number]  : 1965  PCP: Diego Day  Home Phone      [phone]  Work Phone      Not on file.  Mobile          [phone]      MESSAGE:   Pt requesting refill or new Rx.   Is this a refill or new RX:  Refill   RX name and strength: dextroamphetamine-amphetamine (ADDERALL) 30 mg Tab  Last office visit: 19  Is this a 30-day or 90-day RX:  30  [facility] [place]   Comments:  [facility] but she doesn't have one for December.    Phone:  [phone]

## 2020-03-24 DIAGNOSIS — F90.0 ATTENTION DEFICIT HYPERACTIVITY DISORDER (ADHD), PREDOMINANTLY INATTENTIVE TYPE: ICD-10-CM

## 2020-03-24 RX ORDER — DEXTROAMPHETAMINE SACCHARATE, AMPHETAMINE ASPARTATE, DEXTROAMPHETAMINE SULFATE AND AMPHETAMINE SULFATE 7.5; 7.5; 7.5; 7.5 MG/1; MG/1; MG/1; MG/1
TABLET ORAL
Qty: 60 TABLET | Refills: 0 | Status: SHIPPED | OUTPATIENT
Start: 2020-03-24 | End: 2020-04-23 | Stop reason: SDUPTHER

## 2020-03-24 NOTE — TELEPHONE ENCOUNTER
----- Message from Sara Hdz sent at 3/24/2020 10:39 AM CDT -----  Contact: self  Sylvain Robison  MRN: [id number]  : 1965  PCP: Diego Day  Home Phone      [phone]  Work Phone      Not on file.  Mobile          [phone]      MESSAGE:   Pt requesting refill or new Rx.   Is this a refill or new RX:  refill  RX name and strength: dextroamphetamine-amphetamine 30 mg Tab  Last office visit: 19  Is this a 30-day or 90-day RX:  30  [facility] [place]  Comments:      Phone:  [phone]

## 2020-04-23 DIAGNOSIS — F90.0 ATTENTION DEFICIT HYPERACTIVITY DISORDER (ADHD), PREDOMINANTLY INATTENTIVE TYPE: ICD-10-CM

## 2020-04-23 RX ORDER — DEXTROAMPHETAMINE SACCHARATE, AMPHETAMINE ASPARTATE, DEXTROAMPHETAMINE SULFATE AND AMPHETAMINE SULFATE 7.5; 7.5; 7.5; 7.5 MG/1; MG/1; MG/1; MG/1
TABLET ORAL
Qty: 60 TABLET | Refills: 0 | Status: SHIPPED | OUTPATIENT
Start: 2020-04-23 | End: 2020-05-25 | Stop reason: SDUPTHER

## 2020-05-25 DIAGNOSIS — F90.0 ATTENTION DEFICIT HYPERACTIVITY DISORDER (ADHD), PREDOMINANTLY INATTENTIVE TYPE: ICD-10-CM

## 2020-05-25 RX ORDER — DEXTROAMPHETAMINE SACCHARATE, AMPHETAMINE ASPARTATE, DEXTROAMPHETAMINE SULFATE AND AMPHETAMINE SULFATE 7.5; 7.5; 7.5; 7.5 MG/1; MG/1; MG/1; MG/1
TABLET ORAL
Qty: 60 TABLET | Refills: 0 | Status: SHIPPED | OUTPATIENT
Start: 2020-05-25 | End: 2021-06-16 | Stop reason: SDUPTHER

## 2020-06-12 DIAGNOSIS — M1A.0710 IDIOPATHIC CHRONIC GOUT OF RIGHT FOOT WITHOUT TOPHUS: ICD-10-CM

## 2020-06-12 DIAGNOSIS — F41.1 GAD (GENERALIZED ANXIETY DISORDER): ICD-10-CM

## 2020-06-15 RX ORDER — ALPRAZOLAM 0.5 MG/1
0.5 TABLET ORAL 2 TIMES DAILY PRN
Qty: 60 TABLET | Refills: 5 | Status: SHIPPED | OUTPATIENT
Start: 2020-06-15 | End: 2020-12-16 | Stop reason: SDUPTHER

## 2020-06-15 RX ORDER — ALLOPURINOL 300 MG/1
300 TABLET ORAL DAILY
Qty: 30 TABLET | Refills: 5 | Status: SHIPPED | OUTPATIENT
Start: 2020-06-15 | End: 2020-12-13 | Stop reason: SDUPTHER

## 2020-06-16 DIAGNOSIS — K21.00 GASTROESOPHAGEAL REFLUX DISEASE WITH ESOPHAGITIS: ICD-10-CM

## 2020-06-16 RX ORDER — PANTOPRAZOLE SODIUM 40 MG/1
TABLET, DELAYED RELEASE ORAL
Qty: 30 TABLET | Refills: 5 | Status: SHIPPED | OUTPATIENT
Start: 2020-06-16 | End: 2020-12-13 | Stop reason: SDUPTHER

## 2020-06-24 DIAGNOSIS — F90.0 ATTENTION DEFICIT HYPERACTIVITY DISORDER (ADHD), PREDOMINANTLY INATTENTIVE TYPE: ICD-10-CM

## 2020-06-24 NOTE — TELEPHONE ENCOUNTER
----- Message from Neda Zamudio sent at 2020  3:45 PM CDT -----  Regarding: med refill  Contact: Self  Sylvain Robison  MRN: [id number]  : 1965  PCP: Diego Day  Home Phone      [phone]  Work Phone      Not on file.  Mobile          [phone]      MESSAGE:   Pt requesting refill or new Rx.   Is this a refill or new RX:  refill  RX name and strength: dextroamphetamine-amphetamine 30 mg Tab  Last office visit: 2019  Is this a 30-day or 90-day RX:  30-Day  [facility] [place]  Comments:      Phone:  [phone]

## 2020-06-25 RX ORDER — DEXTROAMPHETAMINE SACCHARATE, AMPHETAMINE ASPARTATE, DEXTROAMPHETAMINE SULFATE AND AMPHETAMINE SULFATE 7.5; 7.5; 7.5; 7.5 MG/1; MG/1; MG/1; MG/1
TABLET ORAL
Qty: 60 TABLET | Refills: 0 | Status: SHIPPED | OUTPATIENT
Start: 2020-06-25 | End: 2020-07-27 | Stop reason: SDUPTHER

## 2020-07-03 DIAGNOSIS — M48.062 SPINAL STENOSIS OF LUMBAR REGION WITH NEUROGENIC CLAUDICATION: ICD-10-CM

## 2020-07-07 RX ORDER — GABAPENTIN 300 MG/1
300 CAPSULE ORAL 2 TIMES DAILY
Qty: 60 CAPSULE | Refills: 5 | Status: SHIPPED | OUTPATIENT
Start: 2020-07-07 | End: 2020-12-16 | Stop reason: SDUPTHER

## 2020-07-14 DIAGNOSIS — I10 ESSENTIAL HYPERTENSION: ICD-10-CM

## 2020-07-15 RX ORDER — HYDROCHLOROTHIAZIDE 25 MG/1
25 TABLET ORAL DAILY
Qty: 30 TABLET | Refills: 5 | Status: SHIPPED | OUTPATIENT
Start: 2020-07-15 | End: 2020-12-16 | Stop reason: SDUPTHER

## 2020-07-25 DIAGNOSIS — G25.81 RESTLESS LEGS SYNDROME: ICD-10-CM

## 2020-07-27 ENCOUNTER — TELEPHONE (OUTPATIENT)
Dept: FAMILY MEDICINE | Facility: CLINIC | Age: 55
End: 2020-07-27

## 2020-07-27 DIAGNOSIS — F90.0 ATTENTION DEFICIT HYPERACTIVITY DISORDER (ADHD), PREDOMINANTLY INATTENTIVE TYPE: ICD-10-CM

## 2020-07-27 RX ORDER — DEXTROAMPHETAMINE SACCHARATE, AMPHETAMINE ASPARTATE, DEXTROAMPHETAMINE SULFATE AND AMPHETAMINE SULFATE 7.5; 7.5; 7.5; 7.5 MG/1; MG/1; MG/1; MG/1
TABLET ORAL
Qty: 60 TABLET | Refills: 0 | Status: SHIPPED | OUTPATIENT
Start: 2020-07-27 | End: 2020-08-27 | Stop reason: SDUPTHER

## 2020-07-27 RX ORDER — PRAMIPEXOLE DIHYDROCHLORIDE 0.25 MG/1
0.5 TABLET ORAL NIGHTLY
Qty: 60 TABLET | Refills: 5 | Status: SHIPPED | OUTPATIENT
Start: 2020-07-27 | End: 2020-12-16 | Stop reason: SDUPTHER

## 2020-07-27 NOTE — TELEPHONE ENCOUNTER
----- Message from Sylvain Villar sent at 2020 11:05 AM CDT -----  Sylvain Robison  MRN: [id number]  : 1965  PCP: Diego Day  Home Phone      [phone]  Work Phone      Not on file.  Mobile          [phone]      MESSAGE:   Pt requesting refill or new Rx.   Is this a refill or new RX:  refill  RX name and strength: Adderall 30 mg  Last office visit: 19  Is this a 30-day or 90-day RX:  30 day  [facility] [place]  Comments:      Phone:  [phone]    PCP: Barb

## 2020-07-27 NOTE — TELEPHONE ENCOUNTER
RX name and strength: Adderall 30 mg  Last office visit: 12/13/19  Is this a 30-day or 90-day RX:  30 day  [facility] [place]    adderall last printed on 6/25/2020

## 2020-08-27 DIAGNOSIS — F90.0 ATTENTION DEFICIT HYPERACTIVITY DISORDER (ADHD), PREDOMINANTLY INATTENTIVE TYPE: ICD-10-CM

## 2020-08-27 RX ORDER — DEXTROAMPHETAMINE SACCHARATE, AMPHETAMINE ASPARTATE, DEXTROAMPHETAMINE SULFATE AND AMPHETAMINE SULFATE 7.5; 7.5; 7.5; 7.5 MG/1; MG/1; MG/1; MG/1
TABLET ORAL
Qty: 60 TABLET | Refills: 0 | Status: SHIPPED | OUTPATIENT
Start: 2020-08-27 | End: 2020-09-28 | Stop reason: SDUPTHER

## 2020-08-27 NOTE — TELEPHONE ENCOUNTER
----- Message from Neda Zamudio sent at 2020  9:15 AM CDT -----  Regarding: med refil  Contact: Self  Sylvain Robison  MRN: [id number]  : 1965  PCP: Diego Day  Home Phone      [phone]  Work Phone      Not on file.  Mobile          [phone]      MESSAGE:   Pt requesting refill or new Rx.   Is this a refill or new RX:  refill  RX name and strength: dextroamphetamine-amphetamine 30 mg Tab  Last office visit: 2019  Is this a 30-day or 90-day RX:  30-Day  [facility] [place]  Comments:      Phone:  [phone]

## 2020-08-27 NOTE — TELEPHONE ENCOUNTER
RX name and strength: dextroamphetamine-amphetamine 30 mg Tab  Last office visit: 12/13/2019  Is this a 30-day or 90-day RX:  30-Day  [facility] last printed on 7/27/2020

## 2020-09-09 ENCOUNTER — PATIENT OUTREACH (OUTPATIENT)
Dept: ADMINISTRATIVE | Facility: HOSPITAL | Age: 55
End: 2020-09-09

## 2020-09-17 DIAGNOSIS — Z12.39 BREAST CANCER SCREENING: ICD-10-CM

## 2020-09-28 DIAGNOSIS — F90.0 ATTENTION DEFICIT HYPERACTIVITY DISORDER (ADHD), PREDOMINANTLY INATTENTIVE TYPE: ICD-10-CM

## 2020-09-28 RX ORDER — DEXTROAMPHETAMINE SACCHARATE, AMPHETAMINE ASPARTATE, DEXTROAMPHETAMINE SULFATE AND AMPHETAMINE SULFATE 7.5; 7.5; 7.5; 7.5 MG/1; MG/1; MG/1; MG/1
TABLET ORAL
Qty: 60 TABLET | Refills: 0 | Status: SHIPPED | OUTPATIENT
Start: 2020-09-28 | End: 2020-10-29 | Stop reason: SDUPTHER

## 2020-09-28 NOTE — TELEPHONE ENCOUNTER
----- Message from Anu Soares sent at 2020  8:55 AM CDT -----  Contact: fax  Sylvain Robison  MRN: [id number]  : 1965  PCP: Diego Day  Home Phone      [phone]  Work Phone      Not on file.  Mobile          [phone]      MESSAGE:   Pt requesting refill or new Rx.   Is this a refill or new RX:  refill  RX name and strength: dextroamphetamine-amphetamine (ADDERALL) 30 mg Tab  Last office visit: 19  Is this a 30-day or 90-day RX:  30  [facility] [place]  Comments:      Phone:  [phone]

## 2020-09-28 NOTE — TELEPHONE ENCOUNTER
RX name and strength: dextroamphetamine-amphetamine (ADDERALL) 30 mg Tab  Last office visit: 12/13/19  Is this a 30-day or 90-day RX:  30  [facility] [place]    adderall last printed on 8/27/2020

## 2020-10-06 ENCOUNTER — PATIENT OUTREACH (OUTPATIENT)
Dept: ADMINISTRATIVE | Facility: HOSPITAL | Age: 55
End: 2020-10-06

## 2020-10-29 ENCOUNTER — TELEPHONE (OUTPATIENT)
Dept: FAMILY MEDICINE | Facility: CLINIC | Age: 55
End: 2020-10-29

## 2020-10-29 DIAGNOSIS — F90.0 ATTENTION DEFICIT HYPERACTIVITY DISORDER (ADHD), PREDOMINANTLY INATTENTIVE TYPE: ICD-10-CM

## 2020-10-29 RX ORDER — DEXTROAMPHETAMINE SACCHARATE, AMPHETAMINE ASPARTATE, DEXTROAMPHETAMINE SULFATE AND AMPHETAMINE SULFATE 7.5; 7.5; 7.5; 7.5 MG/1; MG/1; MG/1; MG/1
TABLET ORAL
Qty: 60 TABLET | Refills: 0 | Status: SHIPPED | OUTPATIENT
Start: 2020-10-29 | End: 2020-11-27 | Stop reason: SDUPTHER

## 2020-10-29 NOTE — TELEPHONE ENCOUNTER
RX name and strength: dextroamphetamine-amphetamine (ADDERALL) 30 mg Tab  Last office visit: 12/13/19  Is this a 30-day or 90-day RX:  30  [facility] [place]      adderall last printed on 9/28/2020

## 2020-10-29 NOTE — TELEPHONE ENCOUNTER
Attention deficit hyperactivity disorder (ADHD), predominantly inattentive type  -     dextroamphetamine-amphetamine (ADDERALL) 30 mg Tab; TAKE ONE TABLET BY MOUTH IN THE MORNING AND ONE TABLET AT NOON  Dispense: 60 tablet; Refill: 0

## 2020-10-29 NOTE — TELEPHONE ENCOUNTER
----- Message from Neda Zamudio sent at 10/29/2020  8:55 AM CDT -----  Regarding: Med refill  Contact: Self  Sylvain Robison  MRN: [id number]  : 1965  PCP: Diego Day  Home Phone      [phone]  Work Phone      Not on file.  Mobile          [phone]      MESSAGE:   Pt requesting refill or new Rx.   Is this a refill or new RX:  refill  RX name and strength: dextroamphetamine-amphetamine (ADDERALL) 30 mg Tab  Last office visit: 19  Is this a 30-day or 90-day RX:  30  [facility] [place]  Comments:       Phone:  [phone]

## 2020-11-27 DIAGNOSIS — F90.0 ATTENTION DEFICIT HYPERACTIVITY DISORDER (ADHD), PREDOMINANTLY INATTENTIVE TYPE: ICD-10-CM

## 2020-11-27 NOTE — TELEPHONE ENCOUNTER
LOV: 12/13/2019  Patient has an appointment on 12/2/2020    Patient is requesting a refill for:    1.) Adderall    Last RX: 10/29/2020    Pharmacy: [facility] in [place].

## 2020-11-29 RX ORDER — DEXTROAMPHETAMINE SACCHARATE, AMPHETAMINE ASPARTATE, DEXTROAMPHETAMINE SULFATE AND AMPHETAMINE SULFATE 7.5; 7.5; 7.5; 7.5 MG/1; MG/1; MG/1; MG/1
TABLET ORAL
Qty: 60 TABLET | Refills: 0 | Status: SHIPPED | OUTPATIENT
Start: 2020-11-29 | End: 2020-12-16 | Stop reason: SDUPTHER

## 2020-12-13 DIAGNOSIS — K21.00 GASTROESOPHAGEAL REFLUX DISEASE WITH ESOPHAGITIS: ICD-10-CM

## 2020-12-13 DIAGNOSIS — M1A.0710 IDIOPATHIC CHRONIC GOUT OF RIGHT FOOT WITHOUT TOPHUS: ICD-10-CM

## 2020-12-14 RX ORDER — ALLOPURINOL 300 MG/1
300 TABLET ORAL DAILY
Qty: 30 TABLET | Refills: 5 | Status: SHIPPED | OUTPATIENT
Start: 2020-12-14 | End: 2021-06-16 | Stop reason: SDUPTHER

## 2020-12-14 RX ORDER — PANTOPRAZOLE SODIUM 40 MG/1
TABLET, DELAYED RELEASE ORAL
Qty: 30 TABLET | Refills: 5 | Status: SHIPPED | OUTPATIENT
Start: 2020-12-14 | End: 2021-06-16 | Stop reason: SDUPTHER

## 2020-12-16 ENCOUNTER — OFFICE VISIT (OUTPATIENT)
Dept: INTERNAL MEDICINE | Facility: CLINIC | Age: 55
End: 2020-12-16

## 2020-12-16 VITALS
HEART RATE: 89 BPM | TEMPERATURE: 98 F | WEIGHT: 176.13 LBS | DIASTOLIC BLOOD PRESSURE: 86 MMHG | OXYGEN SATURATION: 99 % | SYSTOLIC BLOOD PRESSURE: 132 MMHG | RESPIRATION RATE: 18 BRPM | BODY MASS INDEX: 32.41 KG/M2 | HEIGHT: 62 IN

## 2020-12-16 DIAGNOSIS — K21.00 GASTROESOPHAGEAL REFLUX DISEASE WITH ESOPHAGITIS WITHOUT HEMORRHAGE: ICD-10-CM

## 2020-12-16 DIAGNOSIS — G25.81 RESTLESS LEGS SYNDROME: ICD-10-CM

## 2020-12-16 DIAGNOSIS — M48.062 SPINAL STENOSIS OF LUMBAR REGION WITH NEUROGENIC CLAUDICATION: ICD-10-CM

## 2020-12-16 DIAGNOSIS — I10 ESSENTIAL HYPERTENSION: ICD-10-CM

## 2020-12-16 DIAGNOSIS — M1A.0710 IDIOPATHIC CHRONIC GOUT OF RIGHT FOOT WITHOUT TOPHUS: ICD-10-CM

## 2020-12-16 DIAGNOSIS — F41.1 GAD (GENERALIZED ANXIETY DISORDER): ICD-10-CM

## 2020-12-16 DIAGNOSIS — F90.0 ATTENTION DEFICIT HYPERACTIVITY DISORDER (ADHD), PREDOMINANTLY INATTENTIVE TYPE: ICD-10-CM

## 2020-12-16 PROCEDURE — 99214 OFFICE O/P EST MOD 30 MIN: CPT | Mod: PBBFAC,PN | Performed by: FAMILY MEDICINE

## 2020-12-16 PROCEDURE — 99213 OFFICE O/P EST LOW 20 MIN: CPT | Mod: S$PBB,,, | Performed by: FAMILY MEDICINE

## 2020-12-16 PROCEDURE — 99999 PR PBB SHADOW E&M-EST. PATIENT-LVL IV: ICD-10-PCS | Mod: PBBFAC,,, | Performed by: FAMILY MEDICINE

## 2020-12-16 PROCEDURE — 99999 PR PBB SHADOW E&M-EST. PATIENT-LVL IV: CPT | Mod: PBBFAC,,, | Performed by: FAMILY MEDICINE

## 2020-12-16 PROCEDURE — 99213 PR OFFICE/OUTPT VISIT, EST, LEVL III, 20-29 MIN: ICD-10-PCS | Mod: S$PBB,,, | Performed by: FAMILY MEDICINE

## 2020-12-16 RX ORDER — HYDROCHLOROTHIAZIDE 25 MG/1
25 TABLET ORAL DAILY
Qty: 30 TABLET | Refills: 5 | Status: SHIPPED | OUTPATIENT
Start: 2020-12-16 | End: 2021-06-16 | Stop reason: SDUPTHER

## 2020-12-16 RX ORDER — DEXTROAMPHETAMINE SACCHARATE, AMPHETAMINE ASPARTATE, DEXTROAMPHETAMINE SULFATE AND AMPHETAMINE SULFATE 7.5; 7.5; 7.5; 7.5 MG/1; MG/1; MG/1; MG/1
TABLET ORAL
Qty: 60 TABLET | Refills: 0 | Status: SHIPPED | OUTPATIENT
Start: 2021-02-26 | End: 2021-04-01 | Stop reason: SDUPTHER

## 2020-12-16 RX ORDER — DEXTROAMPHETAMINE SACCHARATE, AMPHETAMINE ASPARTATE, DEXTROAMPHETAMINE SULFATE AND AMPHETAMINE SULFATE 7.5; 7.5; 7.5; 7.5 MG/1; MG/1; MG/1; MG/1
TABLET ORAL
Qty: 60 TABLET | Refills: 0 | Status: SHIPPED | OUTPATIENT
Start: 2021-01-27 | End: 2021-06-16

## 2020-12-16 RX ORDER — DEXTROAMPHETAMINE SACCHARATE, AMPHETAMINE ASPARTATE, DEXTROAMPHETAMINE SULFATE AND AMPHETAMINE SULFATE 7.5; 7.5; 7.5; 7.5 MG/1; MG/1; MG/1; MG/1
TABLET ORAL
Qty: 60 TABLET | Refills: 0 | Status: SHIPPED | OUTPATIENT
Start: 2020-12-28 | End: 2021-10-06 | Stop reason: SDUPTHER

## 2020-12-16 RX ORDER — ALLOPURINOL 300 MG/1
300 TABLET ORAL DAILY
Qty: 30 TABLET | Refills: 5 | Status: SHIPPED | OUTPATIENT
Start: 2020-12-16 | End: 2021-06-16 | Stop reason: SDUPTHER

## 2020-12-16 RX ORDER — PRAMIPEXOLE DIHYDROCHLORIDE 0.25 MG/1
0.5 TABLET ORAL NIGHTLY
Qty: 60 TABLET | Refills: 5 | Status: SHIPPED | OUTPATIENT
Start: 2020-12-16 | End: 2021-06-16 | Stop reason: SDUPTHER

## 2020-12-16 RX ORDER — ALPRAZOLAM 0.5 MG/1
0.5 TABLET ORAL 2 TIMES DAILY PRN
Qty: 60 TABLET | Refills: 5 | Status: SHIPPED | OUTPATIENT
Start: 2020-12-16 | End: 2021-06-16 | Stop reason: SDUPTHER

## 2020-12-16 RX ORDER — GABAPENTIN 300 MG/1
300 CAPSULE ORAL 2 TIMES DAILY
Qty: 60 CAPSULE | Refills: 5 | Status: SHIPPED | OUTPATIENT
Start: 2020-12-16 | End: 2021-06-16 | Stop reason: SDUPTHER

## 2020-12-16 RX ORDER — PANTOPRAZOLE SODIUM 40 MG/1
TABLET, DELAYED RELEASE ORAL
Qty: 30 TABLET | Refills: 5 | Status: SHIPPED | OUTPATIENT
Start: 2020-12-16 | End: 2021-06-16 | Stop reason: SDUPTHER

## 2020-12-16 NOTE — PROGRESS NOTES
Pt is a 55 y.o. female who presents for check up for   Encounter Diagnoses   Name Primary?    GIOVANA (generalized anxiety disorder)     Essential hypertension     Gastroesophageal reflux disease with esophagitis without hemorrhage     Restless legs syndrome     Idiopathic chronic gout of right foot without tophus     Spinal stenosis of lumbar region with neurogenic claudication     Attention deficit hyperactivity disorder (ADHD), predominantly inattentive type    . Doing well on current meds. Denies any side effects. Prevention is up to date.    History of present illness:   Sylvain Robison is a 55 y.o. female here for a checkup.  She lost her health insurance.  She is now getting most of her care at [facility].  She has a history of hypertension She was started on chlorthalidone 25 mg every morning.  She tolerates it well.  Her blood pressure is better.  She is losing weight.  Patient has a history of gout in her right foot.  She's taking colchicine, allopurinol.  Her foot pain has improved.  She is doing very well on Adderall.  She is not having trouble sleeping. She is Handling stress at home very well.  She is not having side effects from the medication such as palpitations, anxiety attacks.  She is able to focus and stay on track and get projects finished.  Her  is being treated for lymphoma recurrence and is scheduled for bone marrow transplant.  There is a lot of stress at home.   Complains of neck pain with pain and numbness radiating into her right shoulder.  Comes and goes.  Gabapentin and Anaprox helps.    X-ray was reviewed.  She has facet joint hypertrophy and loss of disc space at C5-6.  She also takes gabapentin which helps but it makes her sleepy.  She takes it twice daily.  Having sciatica pain in left leg.  Tried neurontin which helped.  MRI shows lumbar facet arthropathy, mild lumbar spinal stenosis.  Gabapentin has been very effective.  She is not taking her Celebrex.  She gets a lot of  pain in her left hip and leg at night.  Tramadol usually helps but not lately.  She received epidural sterile injections which helped a little.  Lately she has been having pain in her right foot.  Having restless leg issues at night.    Review of systems:  Gen.: No weight loss  HEENT: No headaches, sinus congestion, change in vision.  Sore throat in the morning  Cardiovascular: No chest pain, palpitations  Respiratory: No cough, shortness of breath  Neurological: Sleeping well, no weakness, no syncope, normal memory, no seizure, no ticks  MSK:  Leg cramps    Physical exam:   Vitals:    12/16/20 1029   BP: 132/86   Pulse: 89   Resp: 18   Temp: 97.8 °F (36.6 °C)     Gen.: Well developed, well nourished white female in no apparent distress  HEENT: Pupils equal round reactive to light and accommodation, extraocular muscles intact, TMs are normal translucent mobile, neck is supple no lymphadenopathy no JVD, throat is clear.  Neck exam: Good range of motion.  Mild Spurling sign.  Cracking palpable.  Upper extremity strength equal and strong.  Upper extremity reflexes 2+ bilaterally   Heart:Cardiac exam revealed the PMI to be normally situated and sized. The rhythm was regular and no extrasystoles were noted during several minutes of auscultation. The first and second heart sounds were normal and physiologic splitting of the second heart sound was noted. There were no murmurs, rubs, clicks, or gallops.  Lungs:Lungs were clear to auscultation and percussion, and with normal diaphragmatic excursion. No wheezes or rales were noted.   Neuro: Neurologically, the patient was awake, alert, and oriented to person, place and time. There were no obvious focal neurologic abnormalities.  Extremities: No clubbing cyanosis or edema.  Right foot swollen and tender  Back: Moderate tenderness and spasm the lumbar region, negative straight leg raise, DTRs are 2+ and equal in knees, Absent in ankles.  Strength in the legs is 5+ equally.   Scoliosis is present.  Straight leg raise did cause lumbar pain and pain into left leg.  Subjective numbness in left lower leg.  Right foot is tender over the dorsum.  She seems to have some metatarsalgia.  Both thumb joints are tender.    Lab Results   Component Value Date    LDLCALC 136.8 05/02/2018     BMP  Lab Results   Component Value Date     05/02/2018    K 3.6 05/02/2018     05/02/2018    CO2 28 05/02/2018    BUN 17 05/02/2018    CREATININE 0.9 05/02/2018    CALCIUM 9.7 05/02/2018    ANIONGAP 12 05/02/2018    ESTGFRAFRICA >60.0 05/02/2018    EGFRNONAA >60.0 05/02/2018       Lab Results   Component Value Date    URICACID 3.8 12/20/2017     Assessment/plan:     GIOVANA (generalized anxiety disorder)  -     ALPRAZolam (XANAX) 0.5 MG tablet; Take 1 tablet (0.5 mg total) by mouth 2 (two) times daily as needed.  Dispense: 60 tablet; Refill: 5    Essential hypertension  -     hydroCHLOROthiazide (HYDRODIURIL) 25 MG tablet; Take 1 tablet (25 mg total) by mouth once daily.  Dispense: 30 tablet; Refill: 5    Gastroesophageal reflux disease with esophagitis without hemorrhage  -     pantoprazole (PROTONIX) 40 MG tablet; TAKE 1 TABLET BY MOUTH ONCE DAILY  Dispense: 30 tablet; Refill: 5    Restless legs syndrome  -     pramipexole (MIRAPEX) 0.25 MG tablet; Take 2 tablets (0.5 mg total) by mouth every evening.  Dispense: 60 tablet; Refill: 5    Idiopathic chronic gout of right foot without tophus  -     allopurinoL (ZYLOPRIM) 300 MG tablet; Take 1 tablet (300 mg total) by mouth once daily.  Dispense: 30 tablet; Refill: 5    Spinal stenosis of lumbar region with neurogenic claudication  -     gabapentin (NEURONTIN) 300 MG capsule; Take 1 capsule (300 mg total) by mouth 2 (two) times daily.  Dispense: 60 capsule; Refill: 5    Attention deficit hyperactivity disorder (ADHD), predominantly inattentive type  -     dextroamphetamine-amphetamine (ADDERALL) 30 mg Tab; TAKE ONE TABLET BY MOUTH IN THE MORNING AND ONE TABLET  AT NOON  Dispense: 60 tablet; Refill: 0  -     dextroamphetamine-amphetamine (ADDERALL) 30 mg Tab; TAKE ONE TABLET BY MOUTH IN THE MORNING AND ONE TABLET AT NOON  Dispense: 60 tablet; Refill: 0  -     dextroamphetamine-amphetamine (ADDERALL) 30 mg Tab; TAKE ONE TABLET BY MOUTH IN THE MORNING AND ONE TABLET AT NOON  Dispense: 60 tablet; Refill: 0    Attention deficit hyperactivity disorder - stable on current medication  The current medication will be changed to Adderall  15 minute discussion with the patient regarding the importance of proper sleep hygiene was completed. The patientwill be encouraged to go to bed at the same time and wake up at the same time even on the weekends. The patient was encouraged to continue giving the medication even on weekends and holidays.    Cervical spondylolysis.    Continue mobic and gabapentin.     Lumbar facet arthropathy/moderate lumbar spinal stenosis on MRI/left sciatic pain/Scoliosis  Celebrex  Gabapentin  Motrin 600 mg po tid prn  Continue gabapentin    Cervical spondylarthritis  Advil    HTN (hypertension)  Two gram sodium diet.    Weight loss discussed.    Try to walk 2 miles per day.    Quit smoking.    Current medications will be:  Chlorthalidone (HYGROTEN) 25 MG Tab; Take 1 tablet (25 mg total) by mouth once daily.    Hypokalemia  -     potassium chloride SA (K-DUR,KLOR-CON) 20 MEQ tablet; Take 1 tablet (20 mEq total) by mouth 2 (two) times daily.  Dispense: 60 tablet; Refill: 2    Attention deficit hyperactivity disorder (ADHD), unspecified ADHD type  -     dextroamphetamine-amphetamine (ADDERALL) 30 mg Tab; 1 po q am, noon  Dispense: 60 tablet; Refill: 0    Benign essential HYPERTENSION  -     chlorthalidone (HYGROTEN) 25 MG Tab; Take 1 tablet (25 mg total) by mouth once daily.  Dispense: 30 tablet; Refill: 5\    Idiopathic chronic gout of right foot without tophus  -     allopurinol (ZYLOPRIM) 300 MG tablet; Take 1 tablet (300 mg total) by mouth once daily.   Dispense: 30 tablet; Refill: 0  -     colchicine (COLCRYS) 0.6 mg tablet; Take 1 tablet (0.6 mg total) by mouth once daily.  Dispense: 30 tablet; Refill: 5    The next appointment will be every 6 months.

## 2020-12-21 DIAGNOSIS — I10 ESSENTIAL HYPERTENSION: ICD-10-CM

## 2020-12-21 RX ORDER — HYDROCHLOROTHIAZIDE 25 MG/1
25 TABLET ORAL DAILY
Qty: 30 TABLET | Refills: 5 | Status: SHIPPED | OUTPATIENT
Start: 2020-12-21 | End: 2021-06-16 | Stop reason: SDUPTHER

## 2021-06-16 ENCOUNTER — OFFICE VISIT (OUTPATIENT)
Dept: INTERNAL MEDICINE | Facility: CLINIC | Age: 56
End: 2021-06-16

## 2021-06-16 VITALS
HEIGHT: 62 IN | RESPIRATION RATE: 16 BRPM | WEIGHT: 172.81 LBS | OXYGEN SATURATION: 98 % | HEART RATE: 80 BPM | DIASTOLIC BLOOD PRESSURE: 80 MMHG | BODY MASS INDEX: 31.8 KG/M2 | SYSTOLIC BLOOD PRESSURE: 130 MMHG

## 2021-06-16 DIAGNOSIS — M48.062 SPINAL STENOSIS OF LUMBAR REGION WITH NEUROGENIC CLAUDICATION: ICD-10-CM

## 2021-06-16 DIAGNOSIS — I10 ESSENTIAL HYPERTENSION: ICD-10-CM

## 2021-06-16 DIAGNOSIS — F41.1 GAD (GENERALIZED ANXIETY DISORDER): ICD-10-CM

## 2021-06-16 DIAGNOSIS — M1A.0710 IDIOPATHIC CHRONIC GOUT OF RIGHT FOOT WITHOUT TOPHUS: ICD-10-CM

## 2021-06-16 DIAGNOSIS — F90.0 ATTENTION DEFICIT HYPERACTIVITY DISORDER (ADHD), PREDOMINANTLY INATTENTIVE TYPE: ICD-10-CM

## 2021-06-16 DIAGNOSIS — G25.81 RESTLESS LEGS SYNDROME: ICD-10-CM

## 2021-06-16 DIAGNOSIS — K21.00 GASTROESOPHAGEAL REFLUX DISEASE WITH ESOPHAGITIS WITHOUT HEMORRHAGE: ICD-10-CM

## 2021-06-16 PROCEDURE — 99213 OFFICE O/P EST LOW 20 MIN: CPT | Mod: S$PBB,,, | Performed by: FAMILY MEDICINE

## 2021-06-16 PROCEDURE — 99999 PR PBB SHADOW E&M-EST. PATIENT-LVL IV: CPT | Mod: PBBFAC,,, | Performed by: FAMILY MEDICINE

## 2021-06-16 PROCEDURE — 99214 OFFICE O/P EST MOD 30 MIN: CPT | Mod: PBBFAC,PN | Performed by: FAMILY MEDICINE

## 2021-06-16 PROCEDURE — 99999 PR PBB SHADOW E&M-EST. PATIENT-LVL IV: ICD-10-PCS | Mod: PBBFAC,,, | Performed by: FAMILY MEDICINE

## 2021-06-16 PROCEDURE — 99213 PR OFFICE/OUTPT VISIT, EST, LEVL III, 20-29 MIN: ICD-10-PCS | Mod: S$PBB,,, | Performed by: FAMILY MEDICINE

## 2021-06-16 RX ORDER — HYDROCHLOROTHIAZIDE 25 MG/1
25 TABLET ORAL DAILY
Qty: 30 TABLET | Refills: 5 | Status: SHIPPED | OUTPATIENT
Start: 2021-06-16 | End: 2021-11-03 | Stop reason: SDUPTHER

## 2021-06-16 RX ORDER — DEXTROAMPHETAMINE SACCHARATE, AMPHETAMINE ASPARTATE, DEXTROAMPHETAMINE SULFATE AND AMPHETAMINE SULFATE 7.5; 7.5; 7.5; 7.5 MG/1; MG/1; MG/1; MG/1
TABLET ORAL
Qty: 60 TABLET | Refills: 0 | Status: SHIPPED | OUTPATIENT
Start: 2021-07-06 | End: 2021-11-03 | Stop reason: SDUPTHER

## 2021-06-16 RX ORDER — GABAPENTIN 300 MG/1
300 CAPSULE ORAL 2 TIMES DAILY
Qty: 60 CAPSULE | Refills: 5 | Status: SHIPPED | OUTPATIENT
Start: 2021-06-16 | End: 2021-11-03 | Stop reason: SDUPTHER

## 2021-06-16 RX ORDER — ALPRAZOLAM 0.5 MG/1
0.5 TABLET ORAL 2 TIMES DAILY PRN
Qty: 60 TABLET | Refills: 5 | Status: SHIPPED | OUTPATIENT
Start: 2021-06-16 | End: 2021-11-03 | Stop reason: SDUPTHER

## 2021-06-16 RX ORDER — DEXTROAMPHETAMINE SACCHARATE, AMPHETAMINE ASPARTATE, DEXTROAMPHETAMINE SULFATE AND AMPHETAMINE SULFATE 7.5; 7.5; 7.5; 7.5 MG/1; MG/1; MG/1; MG/1
TABLET ORAL
Qty: 60 TABLET | Refills: 0 | Status: SHIPPED | OUTPATIENT
Start: 2021-08-05 | End: 2021-11-03 | Stop reason: SDUPTHER

## 2021-06-16 RX ORDER — DEXTROAMPHETAMINE SACCHARATE, AMPHETAMINE ASPARTATE, DEXTROAMPHETAMINE SULFATE AND AMPHETAMINE SULFATE 7.5; 7.5; 7.5; 7.5 MG/1; MG/1; MG/1; MG/1
TABLET ORAL
Qty: 60 TABLET | Refills: 0 | Status: SHIPPED | OUTPATIENT
Start: 2021-09-04 | End: 2021-11-03 | Stop reason: SDUPTHER

## 2021-06-16 RX ORDER — PRAMIPEXOLE DIHYDROCHLORIDE 0.25 MG/1
0.5 TABLET ORAL NIGHTLY
Qty: 60 TABLET | Refills: 5 | Status: SHIPPED | OUTPATIENT
Start: 2021-06-16 | End: 2021-11-03 | Stop reason: SDUPTHER

## 2021-06-16 RX ORDER — PANTOPRAZOLE SODIUM 40 MG/1
TABLET, DELAYED RELEASE ORAL
Qty: 30 TABLET | Refills: 5 | Status: SHIPPED | OUTPATIENT
Start: 2021-06-16 | End: 2021-11-03 | Stop reason: SDUPTHER

## 2021-06-16 RX ORDER — ALLOPURINOL 300 MG/1
300 TABLET ORAL DAILY
Qty: 30 TABLET | Refills: 5 | Status: SHIPPED | OUTPATIENT
Start: 2021-06-16 | End: 2021-11-03 | Stop reason: SDUPTHER

## 2021-10-06 DIAGNOSIS — F90.0 ATTENTION DEFICIT HYPERACTIVITY DISORDER (ADHD), PREDOMINANTLY INATTENTIVE TYPE: ICD-10-CM

## 2021-10-11 RX ORDER — DEXTROAMPHETAMINE SACCHARATE, AMPHETAMINE ASPARTATE, DEXTROAMPHETAMINE SULFATE AND AMPHETAMINE SULFATE 7.5; 7.5; 7.5; 7.5 MG/1; MG/1; MG/1; MG/1
TABLET ORAL
Qty: 60 TABLET | Refills: 0 | Status: SHIPPED | OUTPATIENT
Start: 2021-10-11 | End: 2021-11-03 | Stop reason: SDUPTHER

## 2021-11-03 ENCOUNTER — OFFICE VISIT (OUTPATIENT)
Dept: FAMILY MEDICINE | Facility: CLINIC | Age: 56
End: 2021-11-03

## 2021-11-03 VITALS
SYSTOLIC BLOOD PRESSURE: 136 MMHG | WEIGHT: 171.19 LBS | BODY MASS INDEX: 31.5 KG/M2 | DIASTOLIC BLOOD PRESSURE: 80 MMHG | HEART RATE: 80 BPM | RESPIRATION RATE: 16 BRPM | HEIGHT: 62 IN

## 2021-11-03 DIAGNOSIS — G25.81 RESTLESS LEGS SYNDROME: ICD-10-CM

## 2021-11-03 DIAGNOSIS — M48.062 SPINAL STENOSIS OF LUMBAR REGION WITH NEUROGENIC CLAUDICATION: ICD-10-CM

## 2021-11-03 DIAGNOSIS — I10 ESSENTIAL HYPERTENSION: ICD-10-CM

## 2021-11-03 DIAGNOSIS — M1A.0710 IDIOPATHIC CHRONIC GOUT OF RIGHT FOOT WITHOUT TOPHUS: ICD-10-CM

## 2021-11-03 DIAGNOSIS — K21.00 GASTROESOPHAGEAL REFLUX DISEASE WITH ESOPHAGITIS WITHOUT HEMORRHAGE: ICD-10-CM

## 2021-11-03 DIAGNOSIS — F41.1 GAD (GENERALIZED ANXIETY DISORDER): ICD-10-CM

## 2021-11-03 DIAGNOSIS — F90.0 ATTENTION DEFICIT HYPERACTIVITY DISORDER (ADHD), PREDOMINANTLY INATTENTIVE TYPE: ICD-10-CM

## 2021-11-03 PROCEDURE — 99214 OFFICE O/P EST MOD 30 MIN: CPT | Mod: PBBFAC | Performed by: FAMILY MEDICINE

## 2021-11-03 PROCEDURE — 99999 PR PBB SHADOW E&M-EST. PATIENT-LVL IV: CPT | Mod: PBBFAC,,, | Performed by: FAMILY MEDICINE

## 2021-11-03 PROCEDURE — 99213 OFFICE O/P EST LOW 20 MIN: CPT | Mod: S$PBB,,, | Performed by: FAMILY MEDICINE

## 2021-11-03 PROCEDURE — 99999 PR PBB SHADOW E&M-EST. PATIENT-LVL IV: ICD-10-PCS | Mod: PBBFAC,,, | Performed by: FAMILY MEDICINE

## 2021-11-03 PROCEDURE — 99213 PR OFFICE/OUTPT VISIT, EST, LEVL III, 20-29 MIN: ICD-10-PCS | Mod: S$PBB,,, | Performed by: FAMILY MEDICINE

## 2021-11-03 RX ORDER — GABAPENTIN 300 MG/1
300 CAPSULE ORAL 2 TIMES DAILY
Qty: 60 CAPSULE | Refills: 5 | Status: SHIPPED | OUTPATIENT
Start: 2021-11-03 | End: 2021-11-03 | Stop reason: SDUPTHER

## 2021-11-03 RX ORDER — ALLOPURINOL 300 MG/1
300 TABLET ORAL DAILY
Qty: 30 TABLET | Refills: 5 | Status: SHIPPED | OUTPATIENT
Start: 2021-11-03 | End: 2021-11-03 | Stop reason: SDUPTHER

## 2021-11-03 RX ORDER — PANTOPRAZOLE SODIUM 40 MG/1
TABLET, DELAYED RELEASE ORAL
Qty: 30 TABLET | Refills: 5 | Status: SHIPPED | OUTPATIENT
Start: 2021-11-03 | End: 2021-11-03 | Stop reason: SDUPTHER

## 2021-11-03 RX ORDER — DEXTROAMPHETAMINE SACCHARATE, AMPHETAMINE ASPARTATE, DEXTROAMPHETAMINE SULFATE AND AMPHETAMINE SULFATE 7.5; 7.5; 7.5; 7.5 MG/1; MG/1; MG/1; MG/1
TABLET ORAL
Qty: 60 TABLET | Refills: 0 | Status: SHIPPED | OUTPATIENT
Start: 2022-01-09 | End: 2022-01-31 | Stop reason: SDUPTHER

## 2021-11-03 RX ORDER — DEXTROAMPHETAMINE SACCHARATE, AMPHETAMINE ASPARTATE, DEXTROAMPHETAMINE SULFATE AND AMPHETAMINE SULFATE 7.5; 7.5; 7.5; 7.5 MG/1; MG/1; MG/1; MG/1
TABLET ORAL
Qty: 60 TABLET | Refills: 0 | Status: SHIPPED | OUTPATIENT
Start: 2021-12-09 | End: 2022-01-31 | Stop reason: SDUPTHER

## 2021-11-03 RX ORDER — ALPRAZOLAM 0.5 MG/1
0.5 TABLET ORAL 2 TIMES DAILY PRN
Qty: 60 TABLET | Refills: 5 | Status: SHIPPED | OUTPATIENT
Start: 2021-11-03 | End: 2021-11-03 | Stop reason: SDUPTHER

## 2021-11-03 RX ORDER — PRAMIPEXOLE DIHYDROCHLORIDE 0.25 MG/1
0.5 TABLET ORAL NIGHTLY
Qty: 60 TABLET | Refills: 5 | Status: SHIPPED | OUTPATIENT
Start: 2021-11-03 | End: 2021-11-03 | Stop reason: SDUPTHER

## 2021-11-03 RX ORDER — DEXTROAMPHETAMINE SACCHARATE, AMPHETAMINE ASPARTATE, DEXTROAMPHETAMINE SULFATE AND AMPHETAMINE SULFATE 7.5; 7.5; 7.5; 7.5 MG/1; MG/1; MG/1; MG/1
TABLET ORAL
Qty: 60 TABLET | Refills: 0 | Status: SHIPPED | OUTPATIENT
Start: 2021-11-10 | End: 2021-11-10 | Stop reason: SDUPTHER

## 2021-11-03 RX ORDER — HYDROCHLOROTHIAZIDE 25 MG/1
25 TABLET ORAL DAILY
Qty: 30 TABLET | Refills: 5 | Status: SHIPPED | OUTPATIENT
Start: 2021-11-03 | End: 2021-11-03 | Stop reason: SDUPTHER

## 2021-11-04 RX ORDER — PRAMIPEXOLE DIHYDROCHLORIDE 0.25 MG/1
0.5 TABLET ORAL NIGHTLY
Qty: 60 TABLET | Refills: 5 | Status: SHIPPED | OUTPATIENT
Start: 2021-11-04 | End: 2022-05-31 | Stop reason: SDUPTHER

## 2021-11-04 RX ORDER — PANTOPRAZOLE SODIUM 40 MG/1
TABLET, DELAYED RELEASE ORAL
Qty: 30 TABLET | Refills: 5 | Status: SHIPPED | OUTPATIENT
Start: 2021-11-04 | End: 2022-05-19 | Stop reason: SDUPTHER

## 2021-11-04 RX ORDER — HYDROCHLOROTHIAZIDE 25 MG/1
25 TABLET ORAL DAILY
Qty: 30 TABLET | Refills: 5 | Status: SHIPPED | OUTPATIENT
Start: 2021-11-04 | End: 2022-05-19 | Stop reason: SDUPTHER

## 2021-11-04 RX ORDER — GABAPENTIN 300 MG/1
300 CAPSULE ORAL 2 TIMES DAILY
Qty: 60 CAPSULE | Refills: 5 | Status: SHIPPED | OUTPATIENT
Start: 2021-11-04 | End: 2022-06-01 | Stop reason: SDUPTHER

## 2021-11-04 RX ORDER — ALPRAZOLAM 0.5 MG/1
0.5 TABLET ORAL 2 TIMES DAILY PRN
Qty: 60 TABLET | Refills: 5 | Status: SHIPPED | OUTPATIENT
Start: 2021-11-04 | End: 2022-05-10 | Stop reason: SDUPTHER

## 2021-11-04 RX ORDER — ALLOPURINOL 300 MG/1
300 TABLET ORAL DAILY
Qty: 30 TABLET | Refills: 5 | Status: SHIPPED | OUTPATIENT
Start: 2021-11-04 | End: 2022-05-19 | Stop reason: SDUPTHER

## 2021-11-17 DIAGNOSIS — Z12.31 OTHER SCREENING MAMMOGRAM: ICD-10-CM

## 2022-01-31 ENCOUNTER — OFFICE VISIT (OUTPATIENT)
Dept: FAMILY MEDICINE | Facility: CLINIC | Age: 57
End: 2022-01-31

## 2022-01-31 VITALS
HEART RATE: 86 BPM | BODY MASS INDEX: 30.89 KG/M2 | SYSTOLIC BLOOD PRESSURE: 122 MMHG | HEIGHT: 62 IN | WEIGHT: 167.88 LBS | RESPIRATION RATE: 16 BRPM | DIASTOLIC BLOOD PRESSURE: 80 MMHG

## 2022-01-31 DIAGNOSIS — F90.0 ATTENTION DEFICIT HYPERACTIVITY DISORDER (ADHD), PREDOMINANTLY INATTENTIVE TYPE: ICD-10-CM

## 2022-01-31 PROCEDURE — 99213 OFFICE O/P EST LOW 20 MIN: CPT | Mod: S$PBB,,, | Performed by: FAMILY MEDICINE

## 2022-01-31 PROCEDURE — 99999 PR PBB SHADOW E&M-EST. PATIENT-LVL IV: CPT | Mod: PBBFAC,,, | Performed by: FAMILY MEDICINE

## 2022-01-31 PROCEDURE — 99213 PR OFFICE/OUTPT VISIT, EST, LEVL III, 20-29 MIN: ICD-10-PCS | Mod: S$PBB,,, | Performed by: FAMILY MEDICINE

## 2022-01-31 PROCEDURE — 99999 PR PBB SHADOW E&M-EST. PATIENT-LVL IV: ICD-10-PCS | Mod: PBBFAC,,, | Performed by: FAMILY MEDICINE

## 2022-01-31 PROCEDURE — 99214 OFFICE O/P EST MOD 30 MIN: CPT | Mod: PBBFAC | Performed by: FAMILY MEDICINE

## 2022-01-31 RX ORDER — DEXTROAMPHETAMINE SACCHARATE, AMPHETAMINE ASPARTATE, DEXTROAMPHETAMINE SULFATE AND AMPHETAMINE SULFATE 7.5; 7.5; 7.5; 7.5 MG/1; MG/1; MG/1; MG/1
TABLET ORAL
Qty: 60 TABLET | Refills: 0 | Status: SHIPPED | OUTPATIENT
Start: 2022-03-08 | End: 2022-06-01 | Stop reason: SDUPTHER

## 2022-01-31 RX ORDER — DEXTROAMPHETAMINE SACCHARATE, AMPHETAMINE ASPARTATE, DEXTROAMPHETAMINE SULFATE AND AMPHETAMINE SULFATE 7.5; 7.5; 7.5; 7.5 MG/1; MG/1; MG/1; MG/1
TABLET ORAL
Qty: 60 TABLET | Refills: 0 | Status: SHIPPED | OUTPATIENT
Start: 2022-01-08 | End: 2022-06-01 | Stop reason: SDUPTHER

## 2022-01-31 RX ORDER — DEXTROAMPHETAMINE SACCHARATE, AMPHETAMINE ASPARTATE, DEXTROAMPHETAMINE SULFATE AND AMPHETAMINE SULFATE 7.5; 7.5; 7.5; 7.5 MG/1; MG/1; MG/1; MG/1
TABLET ORAL
Qty: 60 TABLET | Refills: 0 | Status: SHIPPED | OUTPATIENT
Start: 2022-04-07 | End: 2022-04-28 | Stop reason: SDUPTHER

## 2022-01-31 NOTE — PROGRESS NOTES
Pt is a 56 y.o. female who presents for check up for   Encounter Diagnosis   Name Primary?    Attention deficit hyperactivity disorder (ADHD), predominantly inattentive type      History of present illness:   Sylvain Robison is a 56 y.o. female here for a checkup.  She lost her health insurance.  She is now getting most of her care at [facility].  She has a history of hypertension She was started on chlorthalidone 25 mg every morning.  She tolerates it well.  Her blood pressure is better.    Patient has a history of gout in her right foot.  She's taking colchicine, allopurinol.  Her foot pain has improved.  She is doing very well on Adderall.  She is not having trouble sleeping. She is Handling stress at home very well.  She is not having side effects from the medication such as palpitations, anxiety attacks.  She is able to focus and stay on track and get projects finished.  Her  is being treated for lymphoma recurrence and is scheduled for bone marrow transplant.  There is a lot of stress at home.   Complains of neck pain with pain and numbness radiating into her right shoulder.  Comes and goes.  Gabapentin and Anaprox helps.    X-ray was reviewed.  She has facet joint hypertrophy and loss of disc space at C5-6.  She also takes gabapentin which helps but it makes her sleepy.  She takes it twice daily.  Having sciatica pain in left leg.  Tried neurontin which helped.  MRI shows lumbar facet arthropathy, mild lumbar spinal stenosis.  Gabapentin has been very effective.  She is not taking her Celebrex.  She gets a lot of pain in her left hip and leg at night.  Tramadol usually helps but not lately.  She received epidural sterile injections which helped a little.  Lately she has been having pain in her right foot.  Having restless leg issues at night.    Review of systems:  Gen.: No weight loss  HEENT: No headaches, sinus congestion, change in vision.  Sore throat in the morning  Cardiovascular: No chest pain,  palpitations  Respiratory: No cough, shortness of breath  Neurological: Sleeping well, no weakness, no syncope, normal memory, no seizure, no ticks  MSK:  Leg cramps    Physical exam:   Vitals:    01/31/22 1413   BP: 122/80   Pulse: 86   Resp: 16     Gen.: Well developed, well nourished white female in no apparent distress  HEENT: Pupils equal round reactive to light and accommodation, extraocular muscles intact, TMs are normal translucent mobile, neck is supple no lymphadenopathy no JVD, throat is clear.  Neck exam: Good range of motion.  Mild Spurling sign.  Cracking palpable.  Upper extremity strength equal and strong.  Upper extremity reflexes 2+ bilaterally   Heart:Cardiac exam revealed the PMI to be normally situated and sized. The rhythm was regular and no extrasystoles were noted during several minutes of auscultation. The first and second heart sounds were normal and physiologic splitting of the second heart sound was noted. There were no murmurs, rubs, clicks, or gallops.  Lungs:Lungs were clear to auscultation and percussion, and with normal diaphragmatic excursion. No wheezes or rales were noted.   Neuro: Neurologically, the patient was awake, alert, and oriented to person, place and time. There were no obvious focal neurologic abnormalities.  Extremities: No clubbing cyanosis or edema.  Right foot swollen and tender    Lab Results   Component Value Date    LDLCALC 136.8 05/02/2018     BMP  Lab Results   Component Value Date     05/02/2018    K 3.6 05/02/2018     05/02/2018    CO2 28 05/02/2018    BUN 17 05/02/2018    CREATININE 0.9 05/02/2018    CALCIUM 9.7 05/02/2018    ANIONGAP 12 05/02/2018    ESTGFRAFRICA >60.0 05/02/2018    EGFRNONAA >60.0 05/02/2018       Lab Results   Component Value Date    URICACID 3.8 12/20/2017     Assessment/plan:     Attention deficit hyperactivity disorder (ADHD), predominantly inattentive type  -     dextroamphetamine-amphetamine (ADDERALL) 30 mg Tab; TAKE ONE  TABLET BY MOUTH IN THE MORNING AND ONE TABLET AT NOON  Dispense: 60 tablet; Refill: 0  -     dextroamphetamine-amphetamine (ADDERALL) 30 mg Tab; TAKE ONE TABLET BY MOUTH IN THE MORNING AND ONE TABLET AT NOON  Dispense: 60 tablet; Refill: 0  -     dextroamphetamine-amphetamine (ADDERALL) 30 mg Tab; TAKE ONE TABLET BY MOUTH IN THE MORNING AND ONE TABLET AT NOON  Dispense: 60 tablet; Refill: 0       Attention deficit hyperactivity disorder - stable on current medication  Continue Adderall  15 minute discussion with the patient regarding the importance of proper sleep hygiene was completed. The patientwill be encouraged to go to bed at the same time and wake up at the same time even on the weekends. The patient was encouraged to continue giving the medication even on weekends and holidays.    Cervical spondylolysis.    Continue mobic and gabapentin.     Lumbar facet arthropathy/moderate lumbar spinal stenosis on MRI/left sciatic pain/Scoliosis  Celebrex  Gabapentin  Motrin 600 mg po tid prn  Continue gabapentin    Cervical spondylarthritis  Advil    HTN (hypertension)  Two gram sodium diet.    Weight loss discussed.    Try to walk 2 miles per day.    Quit smoking.    Current medications will be:  Chlorthalidone (HYGROTEN) 25 MG Tab; Take 1 tablet (25 mg total) by mouth once daily.    Hypokalemia  -     potassium chloride SA (K-DUR,KLOR-CON) 20 MEQ tablet; Take 1 tablet (20 mEq total) by mouth 2 (two) times daily.  Dispense: 60 tablet; Refill: 2    Attention deficit hyperactivity disorder (ADHD), unspecified ADHD type  -     dextroamphetamine-amphetamine (ADDERALL) 30 mg Tab; 1 po q am, noon  Dispense: 60 tablet; Refill: 0    Benign essential HYPERTENSION  -     chlorthalidone (HYGROTEN) 25 MG Tab; Take 1 tablet (25 mg total) by mouth once daily.  Dispense: 30 tablet; Refill: 5\    Idiopathic chronic gout of right foot without tophus  -     allopurinol (ZYLOPRIM) 300 MG tablet; Take 1 tablet (300 mg total) by mouth once  daily.  Dispense: 30 tablet; Refill: 0  -     colchicine (COLCRYS) 0.6 mg tablet; Take 1 tablet (0.6 mg total) by mouth once daily.  Dispense: 30 tablet; Refill: 5    The next appointment will be every 3 months.

## 2022-03-02 DIAGNOSIS — Z12.11 COLON CANCER SCREENING: ICD-10-CM

## 2022-04-29 ENCOUNTER — TELEPHONE (OUTPATIENT)
Dept: INTERNAL MEDICINE | Facility: CLINIC | Age: 57
End: 2022-04-29

## 2022-04-29 NOTE — TELEPHONE ENCOUNTER
----- Message from Michael Pinto sent at 2022  4:32 PM CDT -----  Contact: self  Sylvain Robison  MRN: [id number]  : 1965  PCP: Diego Day  Home Phone      [phone]  Work Phone      Not on file.  Mobile          [phone]      MESSAGE:   Pt is needing a paper printed script for the dextroamphetamine-amphetamine (ADDERALL) 30 mg Tab. States the pharmacy called her and let her know she could not get it filled until the  and wont have the medication til 2022. She is needing a paper script to bring to another pharmacy.      Phone: [phone]  Prescription:dextroamphetamine-amphetamine (ADDERALL) 30 mg Tab

## 2022-05-04 ENCOUNTER — TELEPHONE (OUTPATIENT)
Dept: FAMILY MEDICINE | Facility: CLINIC | Age: 57
End: 2022-05-04

## 2022-05-04 NOTE — TELEPHONE ENCOUNTER
----- Message from Sylvain Villar sent at 2022  9:10 AM CDT -----  Contact: Patient  Sylvain Robison  MRN: [id number]  : 1965  PCP: Diego Day  Home Phone      [phone]  Work Phone      Not on file.  Mobile          [phone]      MESSAGE: requesting to speak with Pat Re: written Rx for Adderall    Call [phone]    PCP: Barb

## 2022-05-10 DIAGNOSIS — F41.1 GAD (GENERALIZED ANXIETY DISORDER): ICD-10-CM

## 2022-05-10 RX ORDER — ALPRAZOLAM 0.5 MG/1
0.5 TABLET ORAL 2 TIMES DAILY PRN
Qty: 60 TABLET | Refills: 0 | Status: SHIPPED | OUTPATIENT
Start: 2022-05-10 | End: 2022-06-01 | Stop reason: SDUPTHER

## 2022-05-10 NOTE — TELEPHONE ENCOUNTER
No new care gaps identified.  [facility] Embedded Care Gaps. Reference number: [id number]. 5/10/2022   8:16:46 AM CDT

## 2022-05-19 DIAGNOSIS — K21.00 GASTROESOPHAGEAL REFLUX DISEASE WITH ESOPHAGITIS WITHOUT HEMORRHAGE: ICD-10-CM

## 2022-05-19 DIAGNOSIS — I10 BENIGN ESSENTIAL HTN: ICD-10-CM

## 2022-05-19 DIAGNOSIS — I10 ESSENTIAL HYPERTENSION: ICD-10-CM

## 2022-05-19 DIAGNOSIS — M1A.0710 IDIOPATHIC CHRONIC GOUT OF RIGHT FOOT WITHOUT TOPHUS: ICD-10-CM

## 2022-05-19 NOTE — TELEPHONE ENCOUNTER
No new care gaps identified.  [facility] Embedded Care Gaps. Reference number: [id number]. 5/19/2022   11:07:56 AM CARMEN

## 2022-05-23 ENCOUNTER — PATIENT OUTREACH (OUTPATIENT)
Dept: ADMINISTRATIVE | Facility: HOSPITAL | Age: 57
End: 2022-05-23

## 2022-05-23 RX ORDER — HYDROCHLOROTHIAZIDE 25 MG/1
25 TABLET ORAL DAILY
Qty: 90 TABLET | Refills: 0 | Status: SHIPPED | OUTPATIENT
Start: 2022-05-23 | End: 2022-08-19 | Stop reason: SDUPTHER

## 2022-05-23 RX ORDER — ALLOPURINOL 300 MG/1
300 TABLET ORAL DAILY
Qty: 90 TABLET | Refills: 0 | Status: SHIPPED | OUTPATIENT
Start: 2022-05-23 | End: 2022-08-19 | Stop reason: SDUPTHER

## 2022-05-23 RX ORDER — PANTOPRAZOLE SODIUM 40 MG/1
TABLET, DELAYED RELEASE ORAL
Qty: 90 TABLET | Refills: 1 | Status: SHIPPED | OUTPATIENT
Start: 2022-05-23 | End: 2022-08-24 | Stop reason: SDUPTHER

## 2022-05-23 NOTE — TELEPHONE ENCOUNTER
Refill Routing Note   Medication(s) are not appropriate for processing by [facility] for the following reason(s):      - Required laboratory values are outdated    [facility] action(s):  Defer  Approve       Medication Therapy Plan: GERD last commented on 11/3/21. Approved pantoprazole.   Medication reconciliation completed: No     Appointments  past 12m or future 3m with PCP    Date Provider   Last Visit   1/31/2022 Diego Day MD   Next Visit   6/1/2022 Diego Day MD   ED visits in past 90 days: 0        Note composed:7:27 AM 05/23/2022

## 2022-05-31 DIAGNOSIS — G25.81 RESTLESS LEGS SYNDROME: ICD-10-CM

## 2022-05-31 RX ORDER — PRAMIPEXOLE DIHYDROCHLORIDE 0.25 MG/1
0.5 TABLET ORAL NIGHTLY
Qty: 60 TABLET | Refills: 5 | Status: SHIPPED | OUTPATIENT
Start: 2022-05-31 | End: 2022-08-24 | Stop reason: SDUPTHER

## 2022-05-31 NOTE — TELEPHONE ENCOUNTER
Refill Routing Note   Medication(s) are not appropriate for processing by [facility] for the following reason(s):      - Outside of protocol    [facility] action(s):  Route          Medication reconciliation completed: No     Appointments  past 12m or future 3m with PCP    Date Provider   Last Visit   1/31/2022 Diego Day MD   Next Visit   6/1/2022 Diego Day MD   ED visits in past 90 days: 0        Note composed:9:21 AM 05/31/2022

## 2022-06-01 ENCOUNTER — OFFICE VISIT (OUTPATIENT)
Dept: INTERNAL MEDICINE | Facility: CLINIC | Age: 57
End: 2022-06-01

## 2022-06-01 VITALS
HEART RATE: 98 BPM | HEIGHT: 60 IN | RESPIRATION RATE: 20 BRPM | BODY MASS INDEX: 32.79 KG/M2 | DIASTOLIC BLOOD PRESSURE: 86 MMHG | SYSTOLIC BLOOD PRESSURE: 124 MMHG | WEIGHT: 167 LBS | OXYGEN SATURATION: 98 %

## 2022-06-01 DIAGNOSIS — F90.0 ATTENTION DEFICIT HYPERACTIVITY DISORDER (ADHD), PREDOMINANTLY INATTENTIVE TYPE: ICD-10-CM

## 2022-06-01 DIAGNOSIS — M48.062 SPINAL STENOSIS OF LUMBAR REGION WITH NEUROGENIC CLAUDICATION: ICD-10-CM

## 2022-06-01 DIAGNOSIS — F41.1 GAD (GENERALIZED ANXIETY DISORDER): ICD-10-CM

## 2022-06-01 PROCEDURE — 99999 PR PBB SHADOW E&M-EST. PATIENT-LVL IV: CPT | Mod: PBBFAC,,, | Performed by: FAMILY MEDICINE

## 2022-06-01 PROCEDURE — 99999 PR PBB SHADOW E&M-EST. PATIENT-LVL IV: ICD-10-PCS | Mod: PBBFAC,,, | Performed by: FAMILY MEDICINE

## 2022-06-01 PROCEDURE — 99213 PR OFFICE/OUTPT VISIT, EST, LEVL III, 20-29 MIN: ICD-10-PCS | Mod: S$PBB,,, | Performed by: FAMILY MEDICINE

## 2022-06-01 PROCEDURE — 99213 OFFICE O/P EST LOW 20 MIN: CPT | Mod: S$PBB,,, | Performed by: FAMILY MEDICINE

## 2022-06-01 PROCEDURE — 99214 OFFICE O/P EST MOD 30 MIN: CPT | Mod: PBBFAC,PN | Performed by: FAMILY MEDICINE

## 2022-06-01 RX ORDER — DEXTROAMPHETAMINE SACCHARATE, AMPHETAMINE ASPARTATE, DEXTROAMPHETAMINE SULFATE AND AMPHETAMINE SULFATE 7.5; 7.5; 7.5; 7.5 MG/1; MG/1; MG/1; MG/1
TABLET ORAL
Qty: 60 TABLET | Refills: 0 | Status: SHIPPED | OUTPATIENT
Start: 2022-06-03 | End: 2022-08-24 | Stop reason: SDUPTHER

## 2022-06-01 RX ORDER — ALPRAZOLAM 0.5 MG/1
0.5 TABLET ORAL 2 TIMES DAILY PRN
Qty: 60 TABLET | Refills: 0 | Status: SHIPPED | OUTPATIENT
Start: 2022-06-03 | End: 2022-08-31 | Stop reason: SDUPTHER

## 2022-06-01 RX ORDER — GABAPENTIN 300 MG/1
300 CAPSULE ORAL 2 TIMES DAILY
Qty: 60 CAPSULE | Refills: 5 | Status: SHIPPED | OUTPATIENT
Start: 2022-06-01 | End: 2022-11-16 | Stop reason: SDUPTHER

## 2022-06-01 RX ORDER — DEXTROAMPHETAMINE SACCHARATE, AMPHETAMINE ASPARTATE, DEXTROAMPHETAMINE SULFATE AND AMPHETAMINE SULFATE 7.5; 7.5; 7.5; 7.5 MG/1; MG/1; MG/1; MG/1
TABLET ORAL
Qty: 60 TABLET | Refills: 0 | Status: SHIPPED | OUTPATIENT
Start: 2022-07-01 | End: 2022-08-24 | Stop reason: SDUPTHER

## 2022-06-01 RX ORDER — DEXTROAMPHETAMINE SACCHARATE, AMPHETAMINE ASPARTATE, DEXTROAMPHETAMINE SULFATE AND AMPHETAMINE SULFATE 7.5; 7.5; 7.5; 7.5 MG/1; MG/1; MG/1; MG/1
TABLET ORAL
Qty: 60 TABLET | Refills: 0 | Status: SHIPPED | OUTPATIENT
Start: 2022-08-03 | End: 2022-08-24 | Stop reason: SDUPTHER

## 2022-06-01 NOTE — PROGRESS NOTES
Pt is a 57 y.o. female who presents for check up for   Encounter Diagnoses   Name Primary?    Attention deficit hyperactivity disorder (ADHD), predominantly inattentive type     GIOVANA (generalized anxiety disorder)     Spinal stenosis of lumbar region with neurogenic claudication      History of present illness:   Sylvain Robison is a 57 y.o. female here for a checkup.  She lost her health insurance.  She is now getting most of her care at [facility].  She has a history of hypertension She was started on chlorthalidone 25 mg every morning.  She tolerates it well.  Her blood pressure is better.    Patient has a history of gout in her right foot.  She's taking colchicine, allopurinol.  Her foot pain has improved.  She is doing very well on Adderall.  She is not having trouble sleeping. She is Handling stress at home very well.  She is not having side effects from the medication such as palpitations, anxiety attacks.  She is able to focus and stay on track and get projects finished.  Her  is being treated for lymphoma recurrence and is scheduled for bone marrow transplant.  There is a lot of stress at home.   Complains of neck pain with pain and numbness radiating into her right shoulder.  Comes and goes.  Gabapentin and Anaprox helps.    X-ray was reviewed.  She has facet joint hypertrophy and loss of disc space at C5-6.  She also takes gabapentin which helps but it makes her sleepy.  She takes it twice daily.  Having sciatica pain in left leg.  Tried neurontin which helped.  MRI shows lumbar facet arthropathy, mild lumbar spinal stenosis.  Gabapentin has been very effective.  She is not taking her Celebrex.  She gets a lot of pain in her left hip and leg at night.  Tramadol usually helps but not lately.  She received epidural sterile injections which helped a little.  Lately she has been having pain in her right foot.  Having restless leg issues at night.    Review of systems:  Gen.: No weight loss  HEENT: No  headaches, sinus congestion, change in vision.  Sore throat in the morning  Cardiovascular: No chest pain, palpitations  Respiratory: No cough, shortness of breath  Neurological: Sleeping well, no weakness, no syncope, normal memory, no seizure, no ticks  MSK:  Leg cramps    Physical exam:   Vitals:    06/01/22 1302   BP: 124/86   Pulse: 98   Resp: 20     Gen.: Well developed, well nourished white female in no apparent distress  HEENT: Pupils equal round reactive to light and accommodation, extraocular muscles intact, TMs are normal translucent mobile, neck is supple no lymphadenopathy no JVD, throat is clear.  Neck exam: Good range of motion.  Mild Spurling sign.  Cracking palpable.  Upper extremity strength equal and strong.  Upper extremity reflexes 2+ bilaterally   Heart:Cardiac exam revealed the PMI to be normally situated and sized. The rhythm was regular and no extrasystoles were noted during several minutes of auscultation. The first and second heart sounds were normal and physiologic splitting of the second heart sound was noted. There were no murmurs, rubs, clicks, or gallops.  Lungs:Lungs were clear to auscultation and percussion, and with normal diaphragmatic excursion. No wheezes or rales were noted.   Neuro: Neurologically, the patient was awake, alert, and oriented to person, place and time. There were no obvious focal neurologic abnormalities.  Extremities: No clubbing cyanosis or edema.  Right foot swollen and tender    Lab Results   Component Value Date    LDLCALC 136.8 05/02/2018     BMP  Lab Results   Component Value Date     05/02/2018    K 3.6 05/02/2018     05/02/2018    CO2 28 05/02/2018    BUN 17 05/02/2018    CREATININE 0.9 05/02/2018    CALCIUM 9.7 05/02/2018    ANIONGAP 12 05/02/2018    ESTGFRAFRICA >60.0 05/02/2018    EGFRNONAA >60.0 05/02/2018       Lab Results   Component Value Date    URICACID 3.8 12/20/2017     Assessment/plan:     Attention deficit hyperactivity disorder  (ADHD), predominantly inattentive type  -     dextroamphetamine-amphetamine (ADDERALL) 30 mg Tab; TAKE ONE TABLET BY MOUTH IN THE MORNING AND ONE TABLET AT NOON  Dispense: 60 tablet; Refill: 0  -     dextroamphetamine-amphetamine (ADDERALL) 30 mg Tab; TAKE ONE TABLET BY MOUTH IN THE MORNING AND ONE TABLET AT NOON  Dispense: 60 tablet; Refill: 0  -     dextroamphetamine-amphetamine (ADDERALL) 30 mg Tab; TAKE ONE TABLET BY MOUTH IN THE MORNING AND ONE TABLET AT NOON  Dispense: 60 tablet; Refill: 0    GIOVANA (generalized anxiety disorder)  -     ALPRAZolam (XANAX) 0.5 MG tablet; Take 1 tablet (0.5 mg total) by mouth 2 (two) times daily as needed for Anxiety.  Dispense: 60 tablet; Refill: 0    Spinal stenosis of lumbar region with neurogenic claudication  -     gabapentin (NEURONTIN) 300 MG capsule; Take 1 capsule (300 mg total) by mouth 2 (two) times daily.  Dispense: 60 capsule; Refill: 5       Attention deficit hyperactivity disorder - stable on current medication  Continue Adderall  15 minute discussion with the patient regarding the importance of proper sleep hygiene was completed. The patientwill be encouraged to go to bed at the same time and wake up at the same time even on the weekends. The patient was encouraged to continue giving the medication even on weekends and holidays.    Cervical spondylolysis.    Continue mobic and gabapentin.     Lumbar facet arthropathy/moderate lumbar spinal stenosis on MRI/left sciatic pain/Scoliosis  Celebrex  Gabapentin  Motrin 600 mg po tid prn  Continue gabapentin    Cervical spondylarthritis  Advil    HTN (hypertension)  Two gram sodium diet.    Weight loss discussed.    Try to walk 2 miles per day.    Quit smoking.    Current medications will be:  Chlorthalidone (HYGROTEN) 25 MG Tab; Take 1 tablet (25 mg total) by mouth once daily.    Hypokalemia  -     potassium chloride SA (K-DUR,KLOR-CON) 20 MEQ tablet; Take 1 tablet (20 mEq total) by mouth 2 (two) times daily.  Dispense: 60  tablet; Refill: 2    Attention deficit hyperactivity disorder (ADHD), unspecified ADHD type  -     dextroamphetamine-amphetamine (ADDERALL) 30 mg Tab; 1 po q am, noon  Dispense: 60 tablet; Refill: 0    Benign essential HYPERTENSION  -     chlorthalidone (HYGROTEN) 25 MG Tab; Take 1 tablet (25 mg total) by mouth once daily.  Dispense: 30 tablet; Refill: 5\    Idiopathic chronic gout of right foot without tophus  -     allopurinol (ZYLOPRIM) 300 MG tablet; Take 1 tablet (300 mg total) by mouth once daily.  Dispense: 30 tablet; Refill: 0  -     colchicine (COLCRYS) 0.6 mg tablet; Take 1 tablet (0.6 mg total) by mouth once daily.  Dispense: 30 tablet; Refill: 5    The next appointment will be every 3 months.

## 2022-08-19 DIAGNOSIS — I10 ESSENTIAL HYPERTENSION: ICD-10-CM

## 2022-08-19 DIAGNOSIS — M1A.0710 IDIOPATHIC CHRONIC GOUT OF RIGHT FOOT WITHOUT TOPHUS: ICD-10-CM

## 2022-08-19 NOTE — TELEPHONE ENCOUNTER
Care Due:                  Date            Visit Type   Department     Provider  --------------------------------------------------------------------------------                                EP -                              [facility] CHING Blevins  Last Visit: 06-      [facility])   [facility]  Next Visit: 08-      [facility]                                                            Last  Test          Frequency    Reason                     Performed    Due Date  --------------------------------------------------------------------------------    CBC.........  12 months..  allopurinoL..............  Not Found    Overdue    CMP.........  12 months..  allopurinoL,               Not Found    Overdue                             hydroCHLOROthiazide......    Uric Acid...  12 months..  allopurinoL..............  Not Found    Overdue    [employer] Embedded Care Gaps. Reference number: [id number]. 8/19/2022   2:59:52 PM CDT

## 2022-08-20 NOTE — TELEPHONE ENCOUNTER
Refill Routing Note   Medication(s) are not appropriate for processing by [facility] for the following reason(s):      - Required laboratory values are outdated    [facility] action(s):  Defer Medication-related problems identified: Requires labs     Medication Therapy Plan: FOVS  Medication reconciliation completed: No     Appointments  past 12m or future 3m with PCP    Date Provider   Last Visit   6/1/2022 Diego Day MD   Next Visit   8/24/2022 Diego Day MD   ED visits in past 90 days: 0        Note composed:7:34 PM 08/19/2022

## 2022-08-22 RX ORDER — ALLOPURINOL 300 MG/1
300 TABLET ORAL DAILY
Qty: 90 TABLET | Refills: 0 | Status: SHIPPED | OUTPATIENT
Start: 2022-08-22 | End: 2022-11-16 | Stop reason: SDUPTHER

## 2022-08-22 RX ORDER — HYDROCHLOROTHIAZIDE 25 MG/1
25 TABLET ORAL DAILY
Qty: 90 TABLET | Refills: 0 | Status: SHIPPED | OUTPATIENT
Start: 2022-08-22 | End: 2022-08-24 | Stop reason: SDUPTHER

## 2022-08-24 ENCOUNTER — OFFICE VISIT (OUTPATIENT)
Dept: INTERNAL MEDICINE | Facility: CLINIC | Age: 57
End: 2022-08-24

## 2022-08-24 VITALS
RESPIRATION RATE: 18 BRPM | SYSTOLIC BLOOD PRESSURE: 132 MMHG | WEIGHT: 170 LBS | HEART RATE: 92 BPM | DIASTOLIC BLOOD PRESSURE: 84 MMHG | BODY MASS INDEX: 33.38 KG/M2 | OXYGEN SATURATION: 98 % | HEIGHT: 60 IN

## 2022-08-24 DIAGNOSIS — G25.81 RESTLESS LEGS SYNDROME: ICD-10-CM

## 2022-08-24 DIAGNOSIS — I10 ESSENTIAL HYPERTENSION: ICD-10-CM

## 2022-08-24 DIAGNOSIS — F90.0 ATTENTION DEFICIT HYPERACTIVITY DISORDER (ADHD), PREDOMINANTLY INATTENTIVE TYPE: ICD-10-CM

## 2022-08-24 DIAGNOSIS — K21.00 GASTROESOPHAGEAL REFLUX DISEASE WITH ESOPHAGITIS WITHOUT HEMORRHAGE: ICD-10-CM

## 2022-08-24 PROCEDURE — 99213 OFFICE O/P EST LOW 20 MIN: CPT | Mod: S$PBB,,, | Performed by: FAMILY MEDICINE

## 2022-08-24 PROCEDURE — 99214 OFFICE O/P EST MOD 30 MIN: CPT | Mod: PBBFAC,PN | Performed by: FAMILY MEDICINE

## 2022-08-24 PROCEDURE — 99999 PR PBB SHADOW E&M-EST. PATIENT-LVL IV: ICD-10-PCS | Mod: PBBFAC,,, | Performed by: FAMILY MEDICINE

## 2022-08-24 PROCEDURE — 99999 PR PBB SHADOW E&M-EST. PATIENT-LVL IV: CPT | Mod: PBBFAC,,, | Performed by: FAMILY MEDICINE

## 2022-08-24 PROCEDURE — 99213 PR OFFICE/OUTPT VISIT, EST, LEVL III, 20-29 MIN: ICD-10-PCS | Mod: S$PBB,,, | Performed by: FAMILY MEDICINE

## 2022-08-24 RX ORDER — DEXTROAMPHETAMINE SACCHARATE, AMPHETAMINE ASPARTATE, DEXTROAMPHETAMINE SULFATE AND AMPHETAMINE SULFATE 7.5; 7.5; 7.5; 7.5 MG/1; MG/1; MG/1; MG/1
TABLET ORAL
Qty: 60 TABLET | Refills: 0 | Status: SHIPPED | OUTPATIENT
Start: 2022-10-27 | End: 2022-11-16 | Stop reason: SDUPTHER

## 2022-08-24 RX ORDER — DEXTROAMPHETAMINE SACCHARATE, AMPHETAMINE ASPARTATE, DEXTROAMPHETAMINE SULFATE AND AMPHETAMINE SULFATE 7.5; 7.5; 7.5; 7.5 MG/1; MG/1; MG/1; MG/1
TABLET ORAL
Qty: 60 TABLET | Refills: 0 | Status: SHIPPED | OUTPATIENT
Start: 2022-09-30 | End: 2022-11-16 | Stop reason: SDUPTHER

## 2022-08-24 RX ORDER — PRAMIPEXOLE DIHYDROCHLORIDE 1 MG/1
1 TABLET ORAL NIGHTLY
Qty: 30 TABLET | Refills: 5 | Status: SHIPPED | OUTPATIENT
Start: 2022-08-24 | End: 2022-11-16 | Stop reason: SDUPTHER

## 2022-08-24 RX ORDER — DEXTROAMPHETAMINE SACCHARATE, AMPHETAMINE ASPARTATE, DEXTROAMPHETAMINE SULFATE AND AMPHETAMINE SULFATE 7.5; 7.5; 7.5; 7.5 MG/1; MG/1; MG/1; MG/1
TABLET ORAL
Qty: 60 TABLET | Refills: 0 | Status: SHIPPED | OUTPATIENT
Start: 2022-09-01 | End: 2022-11-16 | Stop reason: SDUPTHER

## 2022-08-24 RX ORDER — HYDROCHLOROTHIAZIDE 25 MG/1
25 TABLET ORAL DAILY
Qty: 30 TABLET | Refills: 5 | Status: SHIPPED | OUTPATIENT
Start: 2022-08-24 | End: 2022-11-16 | Stop reason: SDUPTHER

## 2022-08-24 RX ORDER — PANTOPRAZOLE SODIUM 40 MG/1
TABLET, DELAYED RELEASE ORAL
Qty: 30 TABLET | Refills: 5 | Status: SHIPPED | OUTPATIENT
Start: 2022-08-24 | End: 2022-11-16 | Stop reason: SDUPTHER

## 2022-08-24 NOTE — PROGRESS NOTES
Pt is a 57 y.o. female who presents for check up for   Encounter Diagnoses   Name Primary?    Attention deficit hyperactivity disorder (ADHD), predominantly inattentive type     Essential hypertension     Gastroesophageal reflux disease with esophagitis without hemorrhage     Restless legs syndrome      History of present illness:   Sylvain Robison is a 57 y.o. female here for a checkup.  She lost her health insurance.  She is now getting most of her care at [facility].  She has a history of hypertension She was started on chlorthalidone 25 mg every morning.  She tolerates it well.  Her blood pressure is better.    Patient has a history of gout in her right foot.  She's taking colchicine, allopurinol.  Her foot pain has improved.  She is doing very well on Adderall.  She is not having trouble sleeping. She is Handling stress at home very well.  She is not having side effects from the medication such as palpitations, anxiety attacks.  She is able to focus and stay on track and get projects finished.  Her  is being treated for lymphoma recurrence and is scheduled for bone marrow transplant.  There is a lot of stress at home.   Complains of neck pain with pain and numbness radiating into her right shoulder.  Comes and goes.  Gabapentin and Anaprox helps.    X-ray was reviewed.  She has facet joint hypertrophy and loss of disc space at C5-6.  She also takes gabapentin which helps but it makes her sleepy.  She takes it twice daily.  Having sciatica pain in left leg.  Tried neurontin which helped.  MRI shows lumbar facet arthropathy, mild lumbar spinal stenosis.  Gabapentin has been very effective.  She is not taking her Celebrex.  She gets a lot of pain in her left hip and leg at night.  Tramadol usually helps but not lately.  She received epidural sterile injections which helped a little.  Lately she has been having pain in her right foot.  Having restless leg issues at night.    Review of systems:  Gen.: No  weight loss  HEENT: No headaches, sinus congestion, change in vision.  Sore throat in the morning  Cardiovascular: No chest pain, palpitations  Respiratory: No cough, shortness of breath  Neurological: Sleeping well, no weakness, no syncope, normal memory, no seizure, no ticks  MSK:  Leg cramps    Physical exam:   Vitals:    08/24/22 1321   BP: 132/84   Pulse: 92   Resp: 18     Gen.: Well developed, well nourished white female in no apparent distress  HEENT: Pupils equal round reactive to light and accommodation, extraocular muscles intact, TMs are normal translucent mobile, neck is supple no lymphadenopathy no JVD, throat is clear.  Neck exam: Good range of motion.  Mild Spurling sign.  Cracking palpable.  Upper extremity strength equal and strong.  Upper extremity reflexes 2+ bilaterally   Heart:Cardiac exam revealed the PMI to be normally situated and sized. The rhythm was regular and no extrasystoles were noted during several minutes of auscultation. The first and second heart sounds were normal and physiologic splitting of the second heart sound was noted. There were no murmurs, rubs, clicks, or gallops.  Lungs:Lungs were clear to auscultation and percussion, and with normal diaphragmatic excursion. No wheezes or rales were noted.   Neuro: Neurologically, the patient was awake, alert, and oriented to person, place and time. There were no obvious focal neurologic abnormalities.  Extremities: No clubbing cyanosis or edema.  Right foot swollen and tender    Lab Results   Component Value Date    LDLCALC 136.8 05/02/2018     BMP  Lab Results   Component Value Date     05/02/2018    K 3.6 05/02/2018     05/02/2018    CO2 28 05/02/2018    BUN 17 05/02/2018    CREATININE 0.9 05/02/2018    CALCIUM 9.7 05/02/2018    ANIONGAP 12 05/02/2018    ESTGFRAFRICA >60.0 05/02/2018    EGFRNONAA >60.0 05/02/2018       Lab Results   Component Value Date    URICACID 3.8 12/20/2017     Assessment/plan:     Attention deficit  hyperactivity disorder (ADHD), predominantly inattentive type  -     dextroamphetamine-amphetamine (ADDERALL) 30 mg Tab; TAKE ONE TABLET BY MOUTH IN THE MORNING AND ONE TABLET AT NOON  Dispense: 60 tablet; Refill: 0  -     dextroamphetamine-amphetamine (ADDERALL) 30 mg Tab; TAKE ONE TABLET BY MOUTH IN THE MORNING AND ONE TABLET AT NOON  Dispense: 60 tablet; Refill: 0  -     dextroamphetamine-amphetamine (ADDERALL) 30 mg Tab; TAKE ONE TABLET BY MOUTH IN THE MORNING AND ONE TABLET AT NOON  Dispense: 60 tablet; Refill: 0    Essential hypertension  -     hydroCHLOROthiazide (HYDRODIURIL) 25 MG tablet; Take 1 tablet (25 mg total) by mouth once daily.  Dispense: 30 tablet; Refill: 5    Gastroesophageal reflux disease with esophagitis without hemorrhage  -     pantoprazole (PROTONIX) 40 MG tablet; TAKE 1 TABLET BY MOUTH ONCE DAILY  Dispense: 30 tablet; Refill: 5    Restless legs syndrome  -     pramipexole (MIRAPEX) 1 MG tablet; Take 1 tablet (1 mg total) by mouth every evening.  Dispense: 30 tablet; Refill: 5       Attention deficit hyperactivity disorder - stable on current medication  Continue Adderall  15 minute discussion with the patient regarding the importance of proper sleep hygiene was completed. The patientwill be encouraged to go to bed at the same time and wake up at the same time even on the weekends. The patient was encouraged to continue giving the medication even on weekends and holidays.    Cervical spondylolysis.    Continue mobic and gabapentin.     Lumbar facet arthropathy/moderate lumbar spinal stenosis on MRI/left sciatic pain/Scoliosis  Celebrex  Gabapentin  Motrin 600 mg po tid prn  Continue gabapentin    Cervical spondylarthritis  Advil    HTN (hypertension)  Two gram sodium diet.    Weight loss discussed.    Try to walk 2 miles per day.    Quit smoking.    Current medications will be:  Chlorthalidone (HYGROTEN) 25 MG Tab; Take 1 tablet (25 mg total) by mouth once daily.    Hypokalemia  -      potassium chloride SA (K-DUR,KLOR-CON) 20 MEQ tablet; Take 1 tablet (20 mEq total) by mouth 2 (two) times daily.  Dispense: 60 tablet; Refill: 2    Attention deficit hyperactivity disorder (ADHD), unspecified ADHD type  -     dextroamphetamine-amphetamine (ADDERALL) 30 mg Tab; 1 po q am, noon  Dispense: 60 tablet; Refill: 0    Benign essential HYPERTENSION  -     chlorthalidone (HYGROTEN) 25 MG Tab; Take 1 tablet (25 mg total) by mouth once daily.  Dispense: 30 tablet; Refill: 5\    Idiopathic chronic gout of right foot without tophus  -     allopurinol (ZYLOPRIM) 300 MG tablet; Take 1 tablet (300 mg total) by mouth once daily.  Dispense: 30 tablet; Refill: 0  -     colchicine (COLCRYS) 0.6 mg tablet; Take 1 tablet (0.6 mg total) by mouth once daily.  Dispense: 30 tablet; Refill: 5    The next appointment will be every 3 months.

## 2022-08-31 DIAGNOSIS — F41.1 GAD (GENERALIZED ANXIETY DISORDER): ICD-10-CM

## 2022-08-31 RX ORDER — ALPRAZOLAM 0.5 MG/1
0.5 TABLET ORAL 2 TIMES DAILY PRN
Qty: 60 TABLET | Refills: 0 | Status: SHIPPED | OUTPATIENT
Start: 2022-08-31 | End: 2022-11-04 | Stop reason: SDUPTHER

## 2022-11-04 DIAGNOSIS — F41.1 GAD (GENERALIZED ANXIETY DISORDER): ICD-10-CM

## 2022-11-04 RX ORDER — ALPRAZOLAM 0.5 MG/1
0.5 TABLET ORAL 2 TIMES DAILY PRN
Qty: 60 TABLET | Refills: 0 | Status: SHIPPED | OUTPATIENT
Start: 2022-11-04 | End: 2022-11-16 | Stop reason: SDUPTHER

## 2022-11-16 ENCOUNTER — OFFICE VISIT (OUTPATIENT)
Dept: INTERNAL MEDICINE | Facility: CLINIC | Age: 57
End: 2022-11-16

## 2022-11-16 VITALS
WEIGHT: 171 LBS | RESPIRATION RATE: 20 BRPM | HEIGHT: 60 IN | BODY MASS INDEX: 33.57 KG/M2 | DIASTOLIC BLOOD PRESSURE: 84 MMHG | OXYGEN SATURATION: 100 % | HEART RATE: 99 BPM | SYSTOLIC BLOOD PRESSURE: 136 MMHG

## 2022-11-16 DIAGNOSIS — M48.062 SPINAL STENOSIS OF LUMBAR REGION WITH NEUROGENIC CLAUDICATION: ICD-10-CM

## 2022-11-16 DIAGNOSIS — I10 ESSENTIAL HYPERTENSION: ICD-10-CM

## 2022-11-16 DIAGNOSIS — F41.1 GAD (GENERALIZED ANXIETY DISORDER): ICD-10-CM

## 2022-11-16 DIAGNOSIS — G25.81 RESTLESS LEGS SYNDROME: ICD-10-CM

## 2022-11-16 DIAGNOSIS — F90.0 ATTENTION DEFICIT HYPERACTIVITY DISORDER (ADHD), PREDOMINANTLY INATTENTIVE TYPE: ICD-10-CM

## 2022-11-16 DIAGNOSIS — M1A.0710 IDIOPATHIC CHRONIC GOUT OF RIGHT FOOT WITHOUT TOPHUS: ICD-10-CM

## 2022-11-16 DIAGNOSIS — K21.00 GASTROESOPHAGEAL REFLUX DISEASE WITH ESOPHAGITIS WITHOUT HEMORRHAGE: ICD-10-CM

## 2022-11-16 PROCEDURE — 99214 OFFICE O/P EST MOD 30 MIN: CPT | Mod: PBBFAC,PN | Performed by: FAMILY MEDICINE

## 2022-11-16 PROCEDURE — 99999 PR PBB SHADOW E&M-EST. PATIENT-LVL IV: CPT | Mod: PBBFAC,,, | Performed by: FAMILY MEDICINE

## 2022-11-16 PROCEDURE — 99213 PR OFFICE/OUTPT VISIT, EST, LEVL III, 20-29 MIN: ICD-10-PCS | Mod: S$PBB,,, | Performed by: FAMILY MEDICINE

## 2022-11-16 PROCEDURE — 99999 PR PBB SHADOW E&M-EST. PATIENT-LVL IV: ICD-10-PCS | Mod: PBBFAC,,, | Performed by: FAMILY MEDICINE

## 2022-11-16 PROCEDURE — 99213 OFFICE O/P EST LOW 20 MIN: CPT | Mod: S$PBB,,, | Performed by: FAMILY MEDICINE

## 2022-11-16 RX ORDER — DEXTROAMPHETAMINE SACCHARATE, AMPHETAMINE ASPARTATE, DEXTROAMPHETAMINE SULFATE AND AMPHETAMINE SULFATE 7.5; 7.5; 7.5; 7.5 MG/1; MG/1; MG/1; MG/1
30 TABLET ORAL 2 TIMES DAILY
Qty: 60 TABLET | Refills: 0 | Status: SHIPPED | OUTPATIENT
Start: 2022-11-29 | End: 2023-02-08 | Stop reason: SDUPTHER

## 2022-11-16 RX ORDER — DEXTROAMPHETAMINE SACCHARATE, AMPHETAMINE ASPARTATE, DEXTROAMPHETAMINE SULFATE AND AMPHETAMINE SULFATE 7.5; 7.5; 7.5; 7.5 MG/1; MG/1; MG/1; MG/1
TABLET ORAL
Qty: 60 TABLET | Refills: 0 | Status: SHIPPED | OUTPATIENT
Start: 2023-01-27 | End: 2023-02-08 | Stop reason: SDUPTHER

## 2022-11-16 RX ORDER — PRAMIPEXOLE DIHYDROCHLORIDE 1 MG/1
1 TABLET ORAL NIGHTLY
Qty: 30 TABLET | Refills: 5 | Status: SHIPPED | OUTPATIENT
Start: 2022-11-16 | End: 2022-11-16 | Stop reason: SDUPTHER

## 2022-11-16 RX ORDER — ALLOPURINOL 300 MG/1
300 TABLET ORAL DAILY
Qty: 90 TABLET | Refills: 0 | Status: SHIPPED | OUTPATIENT
Start: 2022-11-16 | End: 2022-11-16 | Stop reason: SDUPTHER

## 2022-11-16 RX ORDER — ALPRAZOLAM 0.5 MG/1
0.5 TABLET ORAL 2 TIMES DAILY PRN
Qty: 60 TABLET | Refills: 0 | Status: SHIPPED | OUTPATIENT
Start: 2022-11-16 | End: 2023-03-02 | Stop reason: SDUPTHER

## 2022-11-16 RX ORDER — ALLOPURINOL 300 MG/1
300 TABLET ORAL DAILY
Qty: 90 TABLET | Refills: 0 | Status: SHIPPED | OUTPATIENT
Start: 2022-11-16 | End: 2023-02-22 | Stop reason: SDUPTHER

## 2022-11-16 RX ORDER — PRAMIPEXOLE DIHYDROCHLORIDE 1 MG/1
1 TABLET ORAL NIGHTLY
Qty: 30 TABLET | Refills: 5 | Status: SHIPPED | OUTPATIENT
Start: 2022-11-16 | End: 2023-05-03 | Stop reason: SDUPTHER

## 2022-11-16 RX ORDER — PANTOPRAZOLE SODIUM 40 MG/1
TABLET, DELAYED RELEASE ORAL
Qty: 30 TABLET | Refills: 5 | Status: SHIPPED | OUTPATIENT
Start: 2022-11-16 | End: 2023-05-03 | Stop reason: SDUPTHER

## 2022-11-16 RX ORDER — GABAPENTIN 300 MG/1
300 CAPSULE ORAL 2 TIMES DAILY
Qty: 60 CAPSULE | Refills: 5 | Status: SHIPPED | OUTPATIENT
Start: 2022-11-16 | End: 2023-05-03 | Stop reason: SDUPTHER

## 2022-11-16 RX ORDER — PANTOPRAZOLE SODIUM 40 MG/1
TABLET, DELAYED RELEASE ORAL
Qty: 30 TABLET | Refills: 5 | Status: SHIPPED | OUTPATIENT
Start: 2022-11-16 | End: 2022-11-16 | Stop reason: SDUPTHER

## 2022-11-16 RX ORDER — ALPRAZOLAM 0.5 MG/1
0.5 TABLET ORAL 2 TIMES DAILY PRN
Qty: 60 TABLET | Refills: 0 | Status: SHIPPED | OUTPATIENT
Start: 2022-11-16 | End: 2022-11-16 | Stop reason: SDUPTHER

## 2022-11-16 RX ORDER — DEXTROAMPHETAMINE SACCHARATE, AMPHETAMINE ASPARTATE, DEXTROAMPHETAMINE SULFATE AND AMPHETAMINE SULFATE 7.5; 7.5; 7.5; 7.5 MG/1; MG/1; MG/1; MG/1
TABLET ORAL
Qty: 60 TABLET | Refills: 0 | Status: SHIPPED | OUTPATIENT
Start: 2022-12-28 | End: 2023-02-08 | Stop reason: SDUPTHER

## 2022-11-16 RX ORDER — GABAPENTIN 300 MG/1
300 CAPSULE ORAL 2 TIMES DAILY
Qty: 60 CAPSULE | Refills: 5 | Status: SHIPPED | OUTPATIENT
Start: 2022-11-16 | End: 2022-11-16 | Stop reason: SDUPTHER

## 2022-11-16 RX ORDER — HYDROCHLOROTHIAZIDE 25 MG/1
25 TABLET ORAL DAILY
Qty: 30 TABLET | Refills: 5 | Status: SHIPPED | OUTPATIENT
Start: 2022-11-16 | End: 2022-11-16 | Stop reason: SDUPTHER

## 2022-11-16 RX ORDER — HYDROCHLOROTHIAZIDE 25 MG/1
25 TABLET ORAL DAILY
Qty: 30 TABLET | Refills: 5 | Status: SHIPPED | OUTPATIENT
Start: 2022-11-16 | End: 2023-05-03 | Stop reason: SDUPTHER

## 2022-11-16 NOTE — PROGRESS NOTES
Pt is a 57 y.o. female who presents for check up for   Encounter Diagnoses   Name Primary?    Attention deficit hyperactivity disorder (ADHD), predominantly inattentive type     Idiopathic chronic gout of right foot without tophus     Gastroesophageal reflux disease with esophagitis without hemorrhage     GIOVANA (generalized anxiety disorder)     Essential hypertension     Restless legs syndrome     Spinal stenosis of lumbar region with neurogenic claudication        History of present illness:   Sylvain Robison is a 57 y.o. female here for a checkup.  She lost her health insurance.  She is now getting most of her care at [facility].  She has a history of hypertension She was started on chlorthalidone 25 mg every morning.  She tolerates it well.  Her blood pressure is better.    Patient has a history of gout in her right foot.  She's taking colchicine, allopurinol.  Her foot pain has improved.  She is doing very well on Adderall.  She is not having trouble sleeping. She is Handling stress at home very well.  She is not having side effects from the medication such as palpitations, anxiety attacks.  She is able to focus and stay on track and get projects finished.  Her  is being treated for lymphoma recurrence and is scheduled for bone marrow transplant.  There is a lot of stress at home.   Complains of neck pain with pain and numbness radiating into her right shoulder.  Comes and goes.  Gabapentin and Anaprox helps.    X-ray was reviewed.  She has facet joint hypertrophy and loss of disc space at C5-6.  She also takes gabapentin which helps but it makes her sleepy.  She takes it twice daily.  Having sciatica pain in left leg.  Tried neurontin which helped.  MRI shows lumbar facet arthropathy, mild lumbar spinal stenosis.  Gabapentin has been very effective.  She is not taking her Celebrex.  She gets a lot of pain in her left hip and leg at night.  Tramadol usually helps but not lately.  She received epidural sterile  injections which helped a little.  Lately she has been having pain in her right foot.  Having restless leg issues at night.    Review of systems:  Gen.: No weight loss  HEENT: No headaches, sinus congestion, change in vision.  Sore throat in the morning  Cardiovascular: No chest pain, palpitations  Respiratory: No cough, shortness of breath  Neurological: Sleeping well, no weakness, no syncope, normal memory, no seizure, no ticks  MSK:  Leg cramps    Physical exam:   Vitals:    11/16/22 1300   BP: 136/84   Pulse: 99   Resp: 20     Gen.: Well developed, well nourished white female in no apparent distress  HEENT: Pupils equal round reactive to light and accommodation, extraocular muscles intact, TMs are normal translucent mobile, neck is supple no lymphadenopathy no JVD, throat is clear.  Neck exam: Good range of motion.  Mild Spurling sign.  Cracking palpable.  Upper extremity strength equal and strong.  Upper extremity reflexes 2+ bilaterally   Heart:Cardiac exam revealed the PMI to be normally situated and sized. The rhythm was regular and no extrasystoles were noted during several minutes of auscultation. The first and second heart sounds were normal and physiologic splitting of the second heart sound was noted. There were no murmurs, rubs, clicks, or gallops.  Lungs:Lungs were clear to auscultation and percussion, and with normal diaphragmatic excursion. No wheezes or rales were noted.   Neuro: Neurologically, the patient was awake, alert, and oriented to person, place and time. There were no obvious focal neurologic abnormalities.  Extremities: No clubbing cyanosis or edema.  Right foot swollen and tender    Lab Results   Component Value Date    LDLCALC 136.8 05/02/2018     BMP  Lab Results   Component Value Date     05/02/2018    K 3.6 05/02/2018     05/02/2018    CO2 28 05/02/2018    BUN 17 05/02/2018    CREATININE 0.9 05/02/2018    CALCIUM 9.7 05/02/2018    ANIONGAP 12 05/02/2018    ESTGFRAFRICA  >60.0 05/02/2018    EGFRNONAA >60.0 05/02/2018       Lab Results   Component Value Date    URICACID 3.8 12/20/2017     Assessment/plan:     Attention deficit hyperactivity disorder (ADHD), predominantly inattentive type  -     dextroamphetamine-amphetamine (ADDERALL) 30 mg Tab; TAKE ONE TABLET BY MOUTH IN THE MORNING AND ONE TABLET AT NOON  Dispense: 60 tablet; Refill: 0  -     dextroamphetamine-amphetamine (ADDERALL) 30 mg Tab; TAKE ONE TABLET BY MOUTH IN THE MORNING AND ONE TABLET AT NOON  Dispense: 60 tablet; Refill: 0  -     dextroamphetamine-amphetamine (ADDERALL) 30 mg Tab; Take 1 tablet (30 mg total) by mouth 2 (two) times a day.  Dispense: 60 tablet; Refill: 0    Idiopathic chronic gout of right foot without tophus  -     Discontinue: allopurinoL (ZYLOPRIM) 300 MG tablet; Take 1 tablet (300 mg total) by mouth once daily.  Dispense: 90 tablet; Refill: 0  -     Uric Acid; Future; Expected date: 11/16/2022  -     allopurinoL (ZYLOPRIM) 300 MG tablet; Take 1 tablet (300 mg total) by mouth once daily.  Dispense: 90 tablet; Refill: 0    Gastroesophageal reflux disease with esophagitis without hemorrhage  -     Discontinue: pantoprazole (PROTONIX) 40 MG tablet; TAKE 1 TABLET BY MOUTH ONCE DAILY  Dispense: 30 tablet; Refill: 5  -     pantoprazole (PROTONIX) 40 MG tablet; TAKE 1 TABLET BY MOUTH ONCE DAILY  Dispense: 30 tablet; Refill: 5    GIOVANA (generalized anxiety disorder)  -     Discontinue: ALPRAZolam (XANAX) 0.5 MG tablet; Take 1 tablet (0.5 mg total) by mouth 2 (two) times daily as needed for Anxiety.  Dispense: 60 tablet; Refill: 0  -     ALPRAZolam (XANAX) 0.5 MG tablet; Take 1 tablet (0.5 mg total) by mouth 2 (two) times daily as needed for Anxiety.  Dispense: 60 tablet; Refill: 0    Essential hypertension  -     Discontinue: hydroCHLOROthiazide (HYDRODIURIL) 25 MG tablet; Take 1 tablet (25 mg total) by mouth once daily.  Dispense: 30 tablet; Refill: 5  -     Comprehensive Metabolic Panel; Future; Expected  date: 11/16/2022  -     Lipid Panel; Future; Expected date: 11/16/2022  -     hydroCHLOROthiazide (HYDRODIURIL) 25 MG tablet; Take 1 tablet (25 mg total) by mouth once daily.  Dispense: 30 tablet; Refill: 5    Restless legs syndrome  -     Discontinue: pramipexole (MIRAPEX) 1 MG tablet; Take 1 tablet (1 mg total) by mouth every evening.  Dispense: 30 tablet; Refill: 5  -     pramipexole (MIRAPEX) 1 MG tablet; Take 1 tablet (1 mg total) by mouth every evening.  Dispense: 30 tablet; Refill: 5    Spinal stenosis of lumbar region with neurogenic claudication  -     Discontinue: gabapentin (NEURONTIN) 300 MG capsule; Take 1 capsule (300 mg total) by mouth 2 (two) times daily.  Dispense: 60 capsule; Refill: 5  -     gabapentin (NEURONTIN) 300 MG capsule; Take 1 capsule (300 mg total) by mouth 2 (two) times daily.  Dispense: 60 capsule; Refill: 5       Attention deficit hyperactivity disorder - stable on current medication  Continue Adderall  15 minute discussion with the patient regarding the importance of proper sleep hygiene was completed. The patientwill be encouraged to go to bed at the same time and wake up at the same time even on the weekends. The patient was encouraged to continue giving the medication even on weekends and holidays.    Cervical spondylolysis.    Continue mobic and gabapentin.     Lumbar facet arthropathy/moderate lumbar spinal stenosis on MRI/left sciatic pain/Scoliosis  Celebrex  Gabapentin  Motrin 600 mg po tid prn  Continue gabapentin    Cervical spondylarthritis  Advil    HTN (hypertension)  Two gram sodium diet.    Weight loss discussed.    Try to walk 2 miles per day.    Quit smoking.    Current medications will be:  Chlorthalidone (HYGROTEN) 25 MG Tab; Take 1 tablet (25 mg total) by mouth once daily.    Hypokalemia  -     potassium chloride SA (K-DUR,KLOR-CON) 20 MEQ tablet; Take 1 tablet (20 mEq total) by mouth 2 (two) times daily.  Dispense: 60 tablet; Refill: 2    Attention deficit  hyperactivity disorder (ADHD), unspecified ADHD type  -     dextroamphetamine-amphetamine (ADDERALL) 30 mg Tab; 1 po q am, noon  Dispense: 60 tablet; Refill: 0    Benign essential HYPERTENSION  -     chlorthalidone (HYGROTEN) 25 MG Tab; Take 1 tablet (25 mg total) by mouth once daily.  Dispense: 30 tablet; Refill: 5\    Idiopathic chronic gout of right foot without tophus  -     allopurinol (ZYLOPRIM) 300 MG tablet; Take 1 tablet (300 mg total) by mouth once daily.  Dispense: 30 tablet; Refill: 0  -     colchicine (COLCRYS) 0.6 mg tablet; Take 1 tablet (0.6 mg total) by mouth once daily.  Dispense: 30 tablet; Refill: 5    The next appointment will be every 3 months.

## 2022-11-18 ENCOUNTER — LAB VISIT (OUTPATIENT)
Dept: LAB | Facility: HOSPITAL | Age: 57
End: 2022-11-18
Attending: FAMILY MEDICINE

## 2022-11-18 DIAGNOSIS — I10 ESSENTIAL HYPERTENSION: ICD-10-CM

## 2022-11-18 DIAGNOSIS — M1A.0710 IDIOPATHIC CHRONIC GOUT OF RIGHT FOOT WITHOUT TOPHUS: ICD-10-CM

## 2022-11-18 LAB
ALBUMIN SERPL BCP-MCNC: 3.3 G/DL (ref 3.5–5.2)
ALP SERPL-CCNC: 115 U/L (ref 55–135)
ALT SERPL W/O P-5'-P-CCNC: 16 U/L (ref 10–44)
ANION GAP SERPL CALC-SCNC: 11 MMOL/L (ref 8–16)
AST SERPL-CCNC: 20 U/L (ref 10–40)
BILIRUB SERPL-MCNC: 0.3 MG/DL (ref 0.1–1)
BUN SERPL-MCNC: 16 MG/DL (ref 6–20)
CALCIUM SERPL-MCNC: 10 MG/DL (ref 8.7–10.5)
CHLORIDE SERPL-SCNC: 103 MMOL/L (ref 95–110)
CHOLEST SERPL-MCNC: 192 MG/DL (ref 120–199)
CHOLEST/HDLC SERPL: 5.3 {RATIO} (ref 2–5)
CO2 SERPL-SCNC: 28 MMOL/L (ref 23–29)
CREAT SERPL-MCNC: 1 MG/DL (ref 0.5–1.4)
EST. GFR  (NO RACE VARIABLE): >60 ML/MIN/1.73 M^2
GLUCOSE SERPL-MCNC: 103 MG/DL (ref 70–110)
HDLC SERPL-MCNC: 36 MG/DL (ref 40–75)
HDLC SERPL: 18.8 % (ref 20–50)
LDLC SERPL CALC-MCNC: 124.4 MG/DL (ref 63–159)
NONHDLC SERPL-MCNC: 156 MG/DL
POTASSIUM SERPL-SCNC: 3.7 MMOL/L (ref 3.5–5.1)
PROT SERPL-MCNC: 7.5 G/DL (ref 6–8.4)
SODIUM SERPL-SCNC: 142 MMOL/L (ref 136–145)
TRIGL SERPL-MCNC: 158 MG/DL (ref 30–150)
URATE SERPL-MCNC: 4.1 MG/DL (ref 2.4–5.7)

## 2022-11-18 PROCEDURE — 36415 COLL VENOUS BLD VENIPUNCTURE: CPT | Performed by: FAMILY MEDICINE

## 2022-11-18 PROCEDURE — 80061 LIPID PANEL: CPT | Performed by: FAMILY MEDICINE

## 2022-11-18 PROCEDURE — 80053 COMPREHEN METABOLIC PANEL: CPT | Performed by: FAMILY MEDICINE

## 2022-11-18 PROCEDURE — 84550 ASSAY OF BLOOD/URIC ACID: CPT | Performed by: FAMILY MEDICINE

## 2023-02-08 ENCOUNTER — OFFICE VISIT (OUTPATIENT)
Dept: INTERNAL MEDICINE | Facility: CLINIC | Age: 58
End: 2023-02-08

## 2023-02-08 VITALS
OXYGEN SATURATION: 99 % | WEIGHT: 172.38 LBS | BODY MASS INDEX: 34.75 KG/M2 | DIASTOLIC BLOOD PRESSURE: 84 MMHG | HEIGHT: 59 IN | HEART RATE: 74 BPM | RESPIRATION RATE: 16 BRPM | SYSTOLIC BLOOD PRESSURE: 130 MMHG

## 2023-02-08 DIAGNOSIS — I10 ESSENTIAL HYPERTENSION: ICD-10-CM

## 2023-02-08 DIAGNOSIS — F90.0 ATTENTION DEFICIT HYPERACTIVITY DISORDER (ADHD), PREDOMINANTLY INATTENTIVE TYPE: ICD-10-CM

## 2023-02-08 DIAGNOSIS — R25.2 MUSCLE CRAMPS: Primary | ICD-10-CM

## 2023-02-08 PROCEDURE — 99999 PR PBB SHADOW E&M-EST. PATIENT-LVL IV: CPT | Mod: PBBFAC,,, | Performed by: FAMILY MEDICINE

## 2023-02-08 PROCEDURE — 99999 PR PBB SHADOW E&M-EST. PATIENT-LVL IV: ICD-10-PCS | Mod: PBBFAC,,, | Performed by: FAMILY MEDICINE

## 2023-02-08 PROCEDURE — 99213 OFFICE O/P EST LOW 20 MIN: CPT | Mod: S$PBB,,, | Performed by: FAMILY MEDICINE

## 2023-02-08 PROCEDURE — 99213 PR OFFICE/OUTPT VISIT, EST, LEVL III, 20-29 MIN: ICD-10-PCS | Mod: S$PBB,,, | Performed by: FAMILY MEDICINE

## 2023-02-08 PROCEDURE — 99214 OFFICE O/P EST MOD 30 MIN: CPT | Mod: PBBFAC,PN | Performed by: FAMILY MEDICINE

## 2023-02-08 RX ORDER — DEXTROAMPHETAMINE SACCHARATE, AMPHETAMINE ASPARTATE, DEXTROAMPHETAMINE SULFATE AND AMPHETAMINE SULFATE 7.5; 7.5; 7.5; 7.5 MG/1; MG/1; MG/1; MG/1
TABLET ORAL
Qty: 60 TABLET | Refills: 0 | Status: SHIPPED | OUTPATIENT
Start: 2023-04-25 | End: 2023-05-03 | Stop reason: SDUPTHER

## 2023-02-08 RX ORDER — POTASSIUM CHLORIDE 20 MEQ/1
20 TABLET, EXTENDED RELEASE ORAL DAILY
Qty: 30 TABLET | Refills: 5 | Status: SHIPPED | OUTPATIENT
Start: 2023-02-08 | End: 2023-05-03 | Stop reason: SDUPTHER

## 2023-02-08 RX ORDER — DEXTROAMPHETAMINE SACCHARATE, AMPHETAMINE ASPARTATE, DEXTROAMPHETAMINE SULFATE AND AMPHETAMINE SULFATE 7.5; 7.5; 7.5; 7.5 MG/1; MG/1; MG/1; MG/1
30 TABLET ORAL 2 TIMES DAILY
Qty: 60 TABLET | Refills: 0 | Status: SHIPPED | OUTPATIENT
Start: 2023-02-25 | End: 2023-03-27 | Stop reason: SDUPTHER

## 2023-02-08 RX ORDER — DEXTROAMPHETAMINE SACCHARATE, AMPHETAMINE ASPARTATE, DEXTROAMPHETAMINE SULFATE AND AMPHETAMINE SULFATE 7.5; 7.5; 7.5; 7.5 MG/1; MG/1; MG/1; MG/1
TABLET ORAL
Qty: 60 TABLET | Refills: 0 | Status: SHIPPED | OUTPATIENT
Start: 2023-05-25 | End: 2023-05-03 | Stop reason: SDUPTHER

## 2023-02-08 NOTE — PROGRESS NOTES
Pt is a 57 y.o. female who presents for check up for   Encounter Diagnoses   Name Primary?    Muscle cramps Yes    Attention deficit hyperactivity disorder (ADHD), predominantly inattentive type        History of present illness:   Sylvain Robison is a 57 y.o. female here for a checkup.  She lost her health insurance.  She is now getting most of her care at [facility].  She has a history of hypertension She takes hydrochlorothiazide 25 mg every morning.  She tolerates it well.  Her blood pressure is better.  She gets a lot of muscle cramps.  Patient has a history of gout in her right foot.  She's taking colchicine, allopurinol.  Her foot pain has improved.  She is doing very well on Adderall.  She is not having trouble sleeping. She is Handling stress at home very well.  She is not having side effects from the medication such as palpitations, anxiety attacks.  She is able to focus and stay on track and get projects finished.  Her  is being treated for lymphoma recurrence and is scheduled for bone marrow transplant.  He is doing better at home stress has improved.  Complains of neck pain with pain and numbness radiating into her right shoulder.  Comes and goes.  Gabapentin and Anaprox helps.    X-ray was reviewed.  She has facet joint hypertrophy and loss of disc space at C5-6.  She also takes gabapentin which helps but it makes her sleepy.  She takes it twice daily.  Having sciatica pain in left leg.  Tried neurontin which helped.  MRI shows lumbar facet arthropathy, mild lumbar spinal stenosis.  Gabapentin has been very effective.  She is not taking her Celebrex.  She gets a lot of pain in her left hip and leg at night.  Tramadol usually helps but not lately.  She received epidural sterile injections which helped a little.      Review of systems:  Gen.: No weight loss  HEENT: No headaches, sinus congestion, change in vision.  Sore throat in the morning  Cardiovascular: No chest pain, palpitations  Respiratory: No  cough, shortness of breath  Neurological: Sleeping well, no weakness, no syncope, normal memory, no seizure, no ticks  MSK:  Leg cramps    Physical exam:   Vitals:    02/08/23 1307   BP: 130/84   Pulse: 74   Resp: 16     Gen.: Well developed, well nourished white female in no apparent distress  HEENT: Pupils equal round reactive to light and accommodation, extraocular muscles intact, TMs are normal translucent mobile, neck is supple no lymphadenopathy no JVD, throat is clear.  Neck exam: Good range of motion.  Mild Spurling sign.  Cracking palpable.  Upper extremity strength equal and strong.  Upper extremity reflexes 2+ bilaterally   Heart:Cardiac exam revealed the PMI to be normally situated and sized. The rhythm was regular and no extrasystoles were noted during several minutes of auscultation. The first and second heart sounds were normal and physiologic splitting of the second heart sound was noted. There were no murmurs, rubs, clicks, or gallops.  Lungs:Lungs were clear to auscultation and percussion, and with normal diaphragmatic excursion. No wheezes or rales were noted.   Neuro: Neurologically, the patient was awake, alert, and oriented to person, place and time. There were no obvious focal neurologic abnormalities.  Extremities: No clubbing cyanosis or edema.      Lab Results   Component Value Date    LDLCALC 124.4 11/18/2022     BMP  Lab Results   Component Value Date     11/18/2022    K 3.7 11/18/2022     11/18/2022    CO2 28 11/18/2022    BUN 16 11/18/2022    CREATININE 1.0 11/18/2022    CALCIUM 10.0 11/18/2022    ANIONGAP 11 11/18/2022    ESTGFRAFRICA >60.0 05/02/2018    EGFRNONAA >60.0 05/02/2018       Lab Results   Component Value Date    URICACID 4.1 11/18/2022     Assessment/plan:     1. Muscle cramps  -     potassium chloride SA (K-DUR,KLOR-CON) 20 MEQ tablet; Take 1 tablet (20 mEq total) by mouth once daily.  Dispense: 30 tablet; Refill: 5    2. Attention deficit hyperactivity  disorder (ADHD), predominantly inattentive type  -     dextroamphetamine-amphetamine (ADDERALL) 30 mg Tab; TAKE ONE TABLET BY MOUTH IN THE MORNING AND ONE TABLET AT NOON  Dispense: 60 tablet; Refill: 0  -     dextroamphetamine-amphetamine (ADDERALL) 30 mg Tab; TAKE ONE TABLET BY MOUTH IN THE MORNING AND ONE TABLET AT NOON  Dispense: 60 tablet; Refill: 0  -     dextroamphetamine-amphetamine (ADDERALL) 30 mg Tab; Take 1 tablet (30 mg total) by mouth 2 (two) times a day.  Dispense: 60 tablet; Refill: 0    3. Essential hypertension  Assessment & Plan:  Two gram sodium diet.    Weight loss discussed.    Try to walk 2 miles per day.    Quit smoking.    Current medications will be:  Hydrochlorothiazide 25 mg daily   Add potassium 20 mEq daily and see if leg cramps get better.            Attention deficit hyperactivity disorder - stable on current medication  Continue Adderall  15 minute discussion with the patient regarding the importance of proper sleep hygiene was completed. The patientwill be encouraged to go to bed at the same time and wake up at the same time even on the weekends. The patient was encouraged to continue giving the medication even on weekends and holidays.    Cervical spondylolysis.    Continue mobic and gabapentin.     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  Dispense: 30 tablet; Refill: 0  -     colchicine (COLCRYS) 0.6 mg tablet; Take 1 tablet (0.6 mg total) by mouth once daily.  Dispense: 30 tablet; Refill: 5    The next appointment will be every 3 months.

## 2023-02-09 NOTE — ASSESSMENT & PLAN NOTE
Two gram sodium diet.    Weight loss discussed.    Try to walk 2 miles per day.    Quit smoking.    Current medications will be:  Hydrochlorothiazide 25 mg daily   Add potassium 20 mEq daily and see if leg cramps get better.

## 2023-02-22 DIAGNOSIS — M1A.0710 IDIOPATHIC CHRONIC GOUT OF RIGHT FOOT WITHOUT TOPHUS: ICD-10-CM

## 2023-02-22 RX ORDER — ALLOPURINOL 300 MG/1
300 TABLET ORAL DAILY
Qty: 90 TABLET | Refills: 0 | Status: SHIPPED | OUTPATIENT
Start: 2023-02-22 | End: 2023-05-03 | Stop reason: SDUPTHER

## 2023-02-22 NOTE — TELEPHONE ENCOUNTER
Care Due:                  Date            Visit Type   Department     Provider  --------------------------------------------------------------------------------                                EP -                              PRIMARY      [facility] CHING Blevins  Last Visit: 02-      CARE ([facility])   MEDICINE       Heidenreich                              EP -                              PRIMARY      [facility] CHING Blevins  Next Visit: 05-      [facility] ([facility])   MEDICINE       Heidenreich                                                            Last  Test          Frequency    Reason                     Performed    Due Date  --------------------------------------------------------------------------------    CBC.........  12 months..  allopurinoL..............  Not Found    Overdue    [employer] Embedded Care Gaps. Reference number: [id number]. 2/22/2023   3:00:43 AM CST

## 2023-03-02 DIAGNOSIS — F41.1 GAD (GENERALIZED ANXIETY DISORDER): ICD-10-CM

## 2023-03-03 RX ORDER — ALPRAZOLAM 0.5 MG/1
0.5 TABLET ORAL 2 TIMES DAILY PRN
Qty: 60 TABLET | Refills: 2 | Status: SHIPPED | OUTPATIENT
Start: 2023-03-03 | End: 2023-05-03 | Stop reason: SDUPTHER

## 2023-03-03 NOTE — TELEPHONE ENCOUNTER
Refill Routing Note   Medication(s) are not appropriate for processing by [facility] for the following reason(s):       Medication outside of protocol    [facility] action(s):  Route         Appointments  past 12m or future 3m with PCP    Date Provider   Last Visit   2/8/2023 Diego Day MD   Next Visit   5/3/2023 Diego Day MD   ED visits in past 90 days: 0        Note composed:12:33 AM 03/03/2023

## 2023-04-05 ENCOUNTER — PATIENT OUTREACH (OUTPATIENT)
Dept: ADMINISTRATIVE | Facility: HOSPITAL | Age: 58
End: 2023-04-05

## 2023-04-05 DIAGNOSIS — Z12.11 COLON CANCER SCREENING: ICD-10-CM

## 2023-04-05 DIAGNOSIS — Z12.31 BREAST CANCER SCREENING BY MAMMOGRAM: Primary | ICD-10-CM

## 2023-04-05 NOTE — PROGRESS NOTES
Chart reviewed, immunization record updated.  No new results noted on [facility] or [facility] web site.  Care Everywhere updated.   Patient care coordination note  Upcoming PCP visit updated.  Next PCP visit 5/03/2023.  MMG and Fit Kit order placed.   Spoke to patient, declined Smoking Cessation program.   Patient declined to schedule MMG at this time due to not having insurance, patient will call in October to schedule with [facility] mobile unit.   Discussed Colorectal Cancer Screening options, patient opted for Fit Kit, maild to patient.   FitKit was given to patient on 4/5/2023 2:16 PM

## 2023-04-13 ENCOUNTER — LAB VISIT (OUTPATIENT)
Dept: LAB | Facility: HOSPITAL | Age: 58
End: 2023-04-13
Attending: FAMILY MEDICINE

## 2023-04-13 DIAGNOSIS — Z12.11 COLON CANCER SCREENING: ICD-10-CM

## 2023-04-13 PROCEDURE — 82274 ASSAY TEST FOR BLOOD FECAL: CPT | Performed by: FAMILY MEDICINE

## 2023-04-18 LAB — HEMOCCULT STL QL IA: NEGATIVE

## 2023-05-03 ENCOUNTER — OFFICE VISIT (OUTPATIENT)
Dept: INTERNAL MEDICINE | Facility: CLINIC | Age: 58
End: 2023-05-03

## 2023-05-03 VITALS
HEIGHT: 59 IN | BODY MASS INDEX: 34.84 KG/M2 | OXYGEN SATURATION: 97 % | DIASTOLIC BLOOD PRESSURE: 89 MMHG | HEART RATE: 80 BPM | WEIGHT: 172.81 LBS | SYSTOLIC BLOOD PRESSURE: 135 MMHG | RESPIRATION RATE: 18 BRPM

## 2023-05-03 DIAGNOSIS — M1A.0710 IDIOPATHIC CHRONIC GOUT OF RIGHT FOOT WITHOUT TOPHUS: ICD-10-CM

## 2023-05-03 DIAGNOSIS — F90.0 ATTENTION DEFICIT HYPERACTIVITY DISORDER (ADHD), PREDOMINANTLY INATTENTIVE TYPE: ICD-10-CM

## 2023-05-03 DIAGNOSIS — G25.81 RESTLESS LEGS SYNDROME: ICD-10-CM

## 2023-05-03 DIAGNOSIS — M48.062 SPINAL STENOSIS OF LUMBAR REGION WITH NEUROGENIC CLAUDICATION: ICD-10-CM

## 2023-05-03 DIAGNOSIS — F41.1 GAD (GENERALIZED ANXIETY DISORDER): ICD-10-CM

## 2023-05-03 DIAGNOSIS — I10 ESSENTIAL HYPERTENSION: ICD-10-CM

## 2023-05-03 DIAGNOSIS — K21.00 GASTROESOPHAGEAL REFLUX DISEASE WITH ESOPHAGITIS WITHOUT HEMORRHAGE: ICD-10-CM

## 2023-05-03 DIAGNOSIS — R25.2 MUSCLE CRAMPS: ICD-10-CM

## 2023-05-03 PROCEDURE — 99213 PR OFFICE/OUTPT VISIT, EST, LEVL III, 20-29 MIN: ICD-10-PCS | Mod: S$PBB,,, | Performed by: FAMILY MEDICINE

## 2023-05-03 PROCEDURE — 99213 OFFICE O/P EST LOW 20 MIN: CPT | Mod: S$PBB,,, | Performed by: FAMILY MEDICINE

## 2023-05-03 PROCEDURE — 99999 PR PBB SHADOW E&M-EST. PATIENT-LVL IV: ICD-10-PCS | Mod: PBBFAC,,, | Performed by: FAMILY MEDICINE

## 2023-05-03 PROCEDURE — 99999 PR PBB SHADOW E&M-EST. PATIENT-LVL IV: CPT | Mod: PBBFAC,,, | Performed by: FAMILY MEDICINE

## 2023-05-03 PROCEDURE — 99214 OFFICE O/P EST MOD 30 MIN: CPT | Mod: PBBFAC,PN | Performed by: FAMILY MEDICINE

## 2023-05-03 RX ORDER — HYDROCHLOROTHIAZIDE 25 MG/1
25 TABLET ORAL DAILY
Qty: 30 TABLET | Refills: 5 | Status: SHIPPED | OUTPATIENT
Start: 2023-05-03 | End: 2023-05-17 | Stop reason: DRUGHIGH

## 2023-05-03 RX ORDER — GABAPENTIN 300 MG/1
300 CAPSULE ORAL 2 TIMES DAILY
Qty: 60 CAPSULE | Refills: 5 | Status: SHIPPED | OUTPATIENT
Start: 2023-05-03 | End: 2023-10-18 | Stop reason: SDUPTHER

## 2023-05-03 RX ORDER — ALPRAZOLAM 0.5 MG/1
0.5 TABLET ORAL 2 TIMES DAILY PRN
Qty: 60 TABLET | Refills: 2 | Status: SHIPPED | OUTPATIENT
Start: 2023-05-03 | End: 2023-07-26 | Stop reason: SDUPTHER

## 2023-05-03 RX ORDER — PANTOPRAZOLE SODIUM 40 MG/1
TABLET, DELAYED RELEASE ORAL
Qty: 30 TABLET | Refills: 5 | Status: SHIPPED | OUTPATIENT
Start: 2023-05-03 | End: 2023-10-18 | Stop reason: SDUPTHER

## 2023-05-03 RX ORDER — PRAMIPEXOLE DIHYDROCHLORIDE 1 MG/1
1 TABLET ORAL NIGHTLY
Qty: 30 TABLET | Refills: 5 | Status: SHIPPED | OUTPATIENT
Start: 2023-05-03 | End: 2023-11-13 | Stop reason: SDUPTHER

## 2023-05-03 RX ORDER — ALLOPURINOL 300 MG/1
300 TABLET ORAL DAILY
Qty: 30 TABLET | Refills: 5 | Status: SHIPPED | OUTPATIENT
Start: 2023-05-03 | End: 2023-10-18 | Stop reason: SDUPTHER

## 2023-05-03 RX ORDER — POTASSIUM CHLORIDE 20 MEQ/1
20 TABLET, EXTENDED RELEASE ORAL DAILY
Qty: 30 TABLET | Refills: 5 | Status: SHIPPED | OUTPATIENT
Start: 2023-05-03 | End: 2023-12-08 | Stop reason: SDUPTHER

## 2023-05-03 RX ORDER — DEXTROAMPHETAMINE SACCHARATE, AMPHETAMINE ASPARTATE, DEXTROAMPHETAMINE SULFATE AND AMPHETAMINE SULFATE 7.5; 7.5; 7.5; 7.5 MG/1; MG/1; MG/1; MG/1
TABLET ORAL
Qty: 60 TABLET | Refills: 0 | Status: SHIPPED | OUTPATIENT
Start: 2023-07-22 | End: 2023-06-28 | Stop reason: SDUPTHER

## 2023-05-03 RX ORDER — DEXTROAMPHETAMINE SACCHARATE, AMPHETAMINE ASPARTATE, DEXTROAMPHETAMINE SULFATE AND AMPHETAMINE SULFATE 7.5; 7.5; 7.5; 7.5 MG/1; MG/1; MG/1; MG/1
TABLET ORAL
Qty: 60 TABLET | Refills: 0 | Status: SHIPPED | OUTPATIENT
Start: 2023-06-23 | End: 2023-06-06 | Stop reason: SDUPTHER

## 2023-05-03 NOTE — PROGRESS NOTES
Pt is a 58 y.o. female who presents for check up for   Encounter Diagnoses   Name Primary?    Attention deficit hyperactivity disorder (ADHD), predominantly inattentive type     GIOVANA (generalized anxiety disorder)     Gastroesophageal reflux disease with esophagitis without hemorrhage     Idiopathic chronic gout of right foot without tophus     Essential hypertension     Muscle cramps     Restless legs syndrome     Spinal stenosis of lumbar region with neurogenic claudication      History of present illness:   Sylvain Robison is a 58 y.o. female here for a checkup.  She lost her health insurance.  She is now getting most of her care at [facility].  She has a history of hypertension She takes hydrochlorothiazide 25 mg every morning.  She tolerates it well.  Her blood pressure is better.  She gets a lot of muscle cramps.  Patient has a history of gout in her right foot.  She's taking colchicine, allopurinol.  Her foot pain has improved.  She is doing very well on Adderall.  She is not having trouble sleeping. She is Handling stress at home very well.  She is not having side effects from the medication such as palpitations, anxiety attacks.  She is able to focus and stay on track and get projects finished.  Her  is being treated for lymphoma recurrence and is scheduled for bone marrow transplant.  He is doing better at home stress has improved.  Complains of neck pain with pain and numbness radiating into her right shoulder.  Comes and goes.  Gabapentin and Anaprox helps.    X-ray was reviewed.  She has facet joint hypertrophy and loss of disc space at C5-6.  She also takes gabapentin which helps but it makes her sleepy.  She takes it twice daily.  Having sciatica pain in left leg.  Tried neurontin which helped.  MRI shows lumbar facet arthropathy, mild lumbar spinal stenosis.  Gabapentin has been very effective.  She is not taking her Celebrex.  She gets a lot of pain in her left hip and leg at night.  Tramadol  usually helps but not lately.  She received epidural sterile injections which helped a little.      Review of systems:  Gen.: No weight loss  HEENT: No headaches, sinus congestion, change in vision.  Sore throat in the morning  Cardiovascular: No chest pain, palpitations  Respiratory: No cough, shortness of breath  Neurological: Sleeping well, no weakness, no syncope, normal memory, no seizure, no ticks  MSK:  Leg cramps    Physical exam:   Vitals:    05/03/23 1413   BP: 135/89   Pulse: 80   Resp:      Gen.: Well developed, well nourished white female in no apparent distress  HEENT: Pupils equal round reactive to light and accommodation, extraocular muscles intact, TMs are normal translucent mobile, neck is supple no lymphadenopathy no JVD, throat is clear.  Neck exam: Good range of motion.  Mild Spurling sign.  Cracking palpable.  Upper extremity strength equal and strong.  Upper extremity reflexes 2+ bilaterally   Heart:Cardiac exam revealed the PMI to be normally situated and sized. The rhythm was regular and no extrasystoles were noted during several minutes of auscultation. The first and second heart sounds were normal and physiologic splitting of the second heart sound was noted. There were no murmurs, rubs, clicks, or gallops.  Lungs:Lungs were clear to auscultation and percussion, and with normal diaphragmatic excursion. No wheezes or rales were noted.   Neuro: Neurologically, the patient was awake, alert, and oriented to person, place and time. There were no obvious focal neurologic abnormalities.  Extremities: No clubbing cyanosis or edema.      Lab Results   Component Value Date    LDLCALC 124.4 11/18/2022     BMP  Lab Results   Component Value Date     11/18/2022    K 3.7 11/18/2022     11/18/2022    CO2 28 11/18/2022    BUN 16 11/18/2022    CREATININE 1.0 11/18/2022    CALCIUM 10.0 11/18/2022    ANIONGAP 11 11/18/2022    ESTGFRAFRICA >60.0 05/02/2018    EGFRNONAA >60.0 05/02/2018       Lab  Results   Component Value Date    URICACID 4.1 11/18/2022     Assessment/plan:     1. Attention deficit hyperactivity disorder (ADHD), predominantly inattentive type  -     dextroamphetamine-amphetamine (ADDERALL) 30 mg Tab; TAKE ONE TABLET BY MOUTH IN THE MORNING AND ONE TABLET AT NOON  Dispense: 60 tablet; Refill: 0  -     dextroamphetamine-amphetamine (ADDERALL) 30 mg Tab; TAKE ONE TABLET BY MOUTH IN THE MORNING AND ONE TABLET AT NOON  Dispense: 60 tablet; Refill: 0    2. GIOVANA (generalized anxiety disorder)  -     ALPRAZolam (XANAX) 0.5 MG tablet; Take 1 tablet (0.5 mg total) by mouth 2 (two) times daily as needed for Anxiety.  Dispense: 60 tablet; Refill: 2    3. Gastroesophageal reflux disease with esophagitis without hemorrhage  -     pantoprazole (PROTONIX) 40 MG tablet; TAKE 1 TABLET BY MOUTH ONCE DAILY  Dispense: 30 tablet; Refill: 5    4. 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  Dispense: 30 tablet; Refill: 5    5. Essential hypertension  -     hydroCHLOROthiazide (HYDRODIURIL) 25 MG tablet; Take 1 tablet (25 mg total) by mouth once daily.  Dispense: 30 tablet; Refill: 5    6. Muscle cramps  -     potassium chloride SA (K-DUR,KLOR-CON) 20 MEQ tablet; Take 1 tablet (20 mEq total) by mouth once daily.  Dispense: 30 tablet; Refill: 5    7. Restless legs syndrome  -     pramipexole (MIRAPEX) 1 MG tablet; Take 1 tablet (1 mg total) by mouth every evening.  Dispense: 30 tablet; Refill: 5    8. Spinal stenosis of lumbar region with neurogenic claudication  -     gabapentin (NEURONTIN) 300 MG capsule; Take 1 capsule (300 mg total) by mouth 2 (two) times daily.  Dispense: 60 capsule; Refill: 5            Attention deficit hyperactivity disorder - stable on current medication  Continue Adderall  15 minute discussion with the patient regarding the importance of proper sleep hygiene was completed. The patientwill  be encouraged to go to bed at the same time and wake up at the same time even on the weekends. The patient was encouraged to continue giving the medication even on weekends and holidays.    Cervical spondylolysis.    Continue mobic and gabapentin.     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  Dispense: 30 tablet; Refill: 0  -     colchicine (COLCRYS) 0.6 mg tablet; Take 1 tablet (0.6 mg total) by mouth once daily.  Dispense: 30 tablet; Refill: 5    The next appointment will be every 3 months.

## 2023-05-17 ENCOUNTER — TELEPHONE (OUTPATIENT)
Dept: INTERNAL MEDICINE | Facility: CLINIC | Age: 58
End: 2023-05-17

## 2023-05-17 ENCOUNTER — OFFICE VISIT (OUTPATIENT)
Dept: INTERNAL MEDICINE | Facility: CLINIC | Age: 58
End: 2023-05-17

## 2023-05-17 VITALS
RESPIRATION RATE: 18 BRPM | SYSTOLIC BLOOD PRESSURE: 158 MMHG | DIASTOLIC BLOOD PRESSURE: 100 MMHG | HEIGHT: 59 IN | OXYGEN SATURATION: 97 % | WEIGHT: 168.38 LBS | HEART RATE: 94 BPM | BODY MASS INDEX: 33.95 KG/M2

## 2023-05-17 DIAGNOSIS — S46.211A BICEPS TENDON RUPTURE, PROXIMAL, RIGHT, INITIAL ENCOUNTER: Primary | ICD-10-CM

## 2023-05-17 DIAGNOSIS — I10 ESSENTIAL HYPERTENSION: ICD-10-CM

## 2023-05-17 PROCEDURE — 99999 PR PBB SHADOW E&M-EST. PATIENT-LVL IV: ICD-10-PCS | Mod: PBBFAC,,, | Performed by: FAMILY MEDICINE

## 2023-05-17 PROCEDURE — 99212 PR OFFICE/OUTPT VISIT, EST, LEVL II, 10-19 MIN: ICD-10-PCS | Mod: S$PBB,,, | Performed by: FAMILY MEDICINE

## 2023-05-17 PROCEDURE — 99212 OFFICE O/P EST SF 10 MIN: CPT | Mod: S$PBB,,, | Performed by: FAMILY MEDICINE

## 2023-05-17 PROCEDURE — 99999 PR PBB SHADOW E&M-EST. PATIENT-LVL IV: CPT | Mod: PBBFAC,,, | Performed by: FAMILY MEDICINE

## 2023-05-17 PROCEDURE — 99214 OFFICE O/P EST MOD 30 MIN: CPT | Mod: PBBFAC,PN | Performed by: FAMILY MEDICINE

## 2023-05-17 RX ORDER — LOSARTAN POTASSIUM AND HYDROCHLOROTHIAZIDE 25; 100 MG/1; MG/1
1 TABLET ORAL DAILY
Qty: 30 TABLET | Refills: 5 | Status: SHIPPED | OUTPATIENT
Start: 2023-05-17 | End: 2023-10-18 | Stop reason: SDUPTHER

## 2023-05-17 NOTE — TELEPHONE ENCOUNTER
----- Message from Sylvain Villar sent at 2023  1:29 PM CDT -----  Contact: Patient  Sylvain Robison  MRN: [id number]  : 1965  PCP: Diego Day  Home Phone      [phone]  Work Phone      Not on file.  [address] [employer]          [phone]      MESSAGE: issue with bicep muscle - requesting appt with Dr Cortez - needs to be around 1:00 -- please advise    Call [phone]    PCP: Barb

## 2023-05-17 NOTE — PROGRESS NOTES
Subjective:       Patient ID: Sylvain Robison is a 58 y.o. female.    Chief Complaint: Arm Pain and Mass (Pt here for  lump in right arm X 4 days. )    Patient noticed a lump in her right upper biceps.  She can not remember having any sudden sharp pain.  She still has full use of her arm.    Review of Systems   Constitutional:  Negative for activity change, chills, fatigue, fever and unexpected weight change.   HENT:  Negative for sore throat and trouble swallowing.    Respiratory:  Negative for cough, chest tightness and shortness of breath.    Cardiovascular:  Negative for chest pain and leg swelling.   Gastrointestinal:  Negative for abdominal pain.   Endocrine: Negative for cold intolerance and heat intolerance.   Genitourinary:  Negative for difficulty urinating.   Musculoskeletal:  Positive for arthralgias, back pain and myalgias. Negative for joint swelling.   Skin:  Negative for rash.   Neurological:  Negative for numbness.   Hematological:  Negative for adenopathy.   Psychiatric/Behavioral:  Negative for decreased concentration.      Objective:      Vitals:    05/17/23 1412   BP: (!) 158/100   Pulse: 94   Resp: 18     Physical Exam  Constitutional:       Appearance: Normal appearance. She is obese.   Cardiovascular:      Rate and Rhythm: Normal rate and regular rhythm.   Pulmonary:      Effort: Pulmonary effort is normal.      Breath sounds: Normal breath sounds.   Musculoskeletal:      Comments: Right upper extremity shows an obvious proximal biceps tendon rupture.  Minimal tenderness.  Good range of motion.   Neurological:      Mental Status: She is alert.       Assessment:       1. Biceps tendon rupture, proximal, right, initial encounter    2. Essential hypertension        Plan:   1. Biceps tendon rupture, proximal, right, initial encounter  Comments:  Long discussion pursued.  She does not want to pursue any orthopedic consultation this time.  I agree with her.    2. Essential  hypertension  Overview:  Two gram sodium diet.    Weight loss discussed.    Try to walk 2 miles per day.    Quit smoking.    Current medications will be:  Losartan HCTZ 100/25 mg daily    Orders:  -     losartan-hydrochlorothiazide 100-25 mg (HYZAAR) 100-25 mg per tablet; Take 1 tablet by mouth once daily.  Dispense: 30 tablet; Refill: 5

## 2023-06-06 DIAGNOSIS — F90.0 ATTENTION DEFICIT HYPERACTIVITY DISORDER (ADHD), PREDOMINANTLY INATTENTIVE TYPE: ICD-10-CM

## 2023-06-06 RX ORDER — DEXTROAMPHETAMINE SACCHARATE, AMPHETAMINE ASPARTATE, DEXTROAMPHETAMINE SULFATE AND AMPHETAMINE SULFATE 7.5; 7.5; 7.5; 7.5 MG/1; MG/1; MG/1; MG/1
TABLET ORAL
Qty: 60 TABLET | Refills: 0 | Status: SHIPPED | OUTPATIENT
Start: 2023-06-23 | End: 2023-07-27

## 2023-06-06 NOTE — TELEPHONE ENCOUNTER
This is a request to help a DR Cortez pt:    Dr Walden would you be will to write and print a script for her for her adderall, she said the last one she got  and so she needs another one.      LOV 23       Pt called stating she had a paper script for her Adderall dated May 25. Pt went to fill and they told her it was . Pt asking for NEW script with NEW date so she can fill it. Pt will  script when ready.      Med pended

## 2023-06-06 NOTE — TELEPHONE ENCOUNTER
----- Message from Caren Bender sent at 2023  1:27 PM CDT -----  Contact: self  Sylvain Robison  MRN: [id number]  : 1965  PCP: Diego Day  Home Phone      [phone]  Work Phone      Not on file.  Mobile          [phone]      MESSAGE:   Pt called stating she had a paper script for her Adderall dated May 25. Pt went to fill and they told her it was . Pt asking for NEW script with NEW date so she can fill it. Pt will  script when ready.      Phone: [phone]

## 2023-06-06 NOTE — TELEPHONE ENCOUNTER
Care Due:                  Date            Visit Type   Department     Provider  --------------------------------------------------------------------------------                                EP -                              PRIMARY      [facility] CHING Blevins  Last Visit: 05-      CARE ([facility])   MEDICINE       Heidenreich                              EP -                              PRIMARY      [facility] CHING Blevins  Next Visit: 07-      [facility] ([facility])   MEDICINE       Heidenreich                                                            Last  Test          Frequency    Reason                     Performed    Due Date  --------------------------------------------------------------------------------    CBC.........  12 months..  allopurinoL..............  Not Found    Overdue    [employer] Embedded Care Due Messages. Reference number: [id number].   6/06/2023 2:09:49 PM CDT

## 2023-06-15 DIAGNOSIS — F90.0 ATTENTION DEFICIT HYPERACTIVITY DISORDER (ADHD), PREDOMINANTLY INATTENTIVE TYPE: ICD-10-CM

## 2023-06-15 RX ORDER — DEXTROAMPHETAMINE SACCHARATE, AMPHETAMINE ASPARTATE, DEXTROAMPHETAMINE SULFATE AND AMPHETAMINE SULFATE 7.5; 7.5; 7.5; 7.5 MG/1; MG/1; MG/1; MG/1
TABLET ORAL
Qty: 60 TABLET | Refills: 0 | OUTPATIENT
Start: 2023-06-23

## 2023-06-15 NOTE — TELEPHONE ENCOUNTER
No care due was identified.  [facility] Embedded Care Due Messages. Reference number: [id number].   6/15/2023 11:22:47 AM CDT

## 2023-06-28 DIAGNOSIS — F90.0 ATTENTION DEFICIT HYPERACTIVITY DISORDER (ADHD), PREDOMINANTLY INATTENTIVE TYPE: ICD-10-CM

## 2023-06-28 NOTE — TELEPHONE ENCOUNTER
Refill Routing Note   Medication(s) are not appropriate for processing by [facility] for the following reason(s):      Medication outside of protocol    [facility] action(s):  Route None identified            Appointments  past 12m or future 3m with PCP    Date Provider   Last Visit   5/17/2023 Diego Day MD   Next Visit   7/26/2023 Diego Day MD   ED visits in past 90 days: 0        Note composed:5:00 PM 06/28/2023

## 2023-06-29 ENCOUNTER — TELEPHONE (OUTPATIENT)
Dept: FAMILY MEDICINE | Facility: CLINIC | Age: 58
End: 2023-06-29

## 2023-06-29 RX ORDER — DEXTROAMPHETAMINE SACCHARATE, AMPHETAMINE ASPARTATE, DEXTROAMPHETAMINE SULFATE AND AMPHETAMINE SULFATE 7.5; 7.5; 7.5; 7.5 MG/1; MG/1; MG/1; MG/1
TABLET ORAL
Qty: 60 TABLET | Refills: 0 | Status: SHIPPED | OUTPATIENT
Start: 2023-07-22 | End: 2023-07-26 | Stop reason: SDUPTHER

## 2023-06-29 NOTE — TELEPHONE ENCOUNTER
"----- Message from Caren Bender sent at 2023  8:58 AM CDT -----  Contact: Darci/pharmacy  Sylvain Robison  MRN: [id number]  : 1965  PCP: Diego Day  Home Phone      [phone]  Work Phone      Not on file.  Mobile          [phone]      MESSAGE:  Darci with [facility] called to speak with nurse to discuss a script for Adderall that pt brought in to get filled. Pharmacist questioned a script that looked "copied" and was dated May 3.  PAR did not see any printed scripts dated on that date.  Please advise      Phone:  [phone]        "

## 2023-07-26 ENCOUNTER — OFFICE VISIT (OUTPATIENT)
Dept: INTERNAL MEDICINE | Facility: CLINIC | Age: 58
End: 2023-07-26

## 2023-07-26 VITALS
DIASTOLIC BLOOD PRESSURE: 64 MMHG | HEART RATE: 89 BPM | SYSTOLIC BLOOD PRESSURE: 112 MMHG | WEIGHT: 173.5 LBS | RESPIRATION RATE: 18 BRPM | BODY MASS INDEX: 34.06 KG/M2 | HEIGHT: 60 IN | OXYGEN SATURATION: 97 %

## 2023-07-26 DIAGNOSIS — F90.0 ATTENTION DEFICIT HYPERACTIVITY DISORDER (ADHD), PREDOMINANTLY INATTENTIVE TYPE: Primary | ICD-10-CM

## 2023-07-26 DIAGNOSIS — F41.1 GAD (GENERALIZED ANXIETY DISORDER): ICD-10-CM

## 2023-07-26 DIAGNOSIS — K04.7 TOOTH ABSCESS: ICD-10-CM

## 2023-07-26 PROCEDURE — 99214 OFFICE O/P EST MOD 30 MIN: CPT | Mod: PBBFAC,PN | Performed by: FAMILY MEDICINE

## 2023-07-26 PROCEDURE — 99213 OFFICE O/P EST LOW 20 MIN: CPT | Mod: S$PBB,,, | Performed by: FAMILY MEDICINE

## 2023-07-26 PROCEDURE — 99999 PR PBB SHADOW E&M-EST. PATIENT-LVL IV: ICD-10-PCS | Mod: PBBFAC,,, | Performed by: FAMILY MEDICINE

## 2023-07-26 PROCEDURE — 99213 PR OFFICE/OUTPT VISIT, EST, LEVL III, 20-29 MIN: ICD-10-PCS | Mod: S$PBB,,, | Performed by: FAMILY MEDICINE

## 2023-07-26 PROCEDURE — 99999 PR PBB SHADOW E&M-EST. PATIENT-LVL IV: CPT | Mod: PBBFAC,,, | Performed by: FAMILY MEDICINE

## 2023-07-26 RX ORDER — DEXTROAMPHETAMINE SACCHARATE, AMPHETAMINE ASPARTATE, DEXTROAMPHETAMINE SULFATE AND AMPHETAMINE SULFATE 7.5; 7.5; 7.5; 7.5 MG/1; MG/1; MG/1; MG/1
TABLET ORAL
Qty: 60 TABLET | Refills: 0 | Status: SHIPPED | OUTPATIENT
Start: 2023-09-08 | End: 2023-10-18 | Stop reason: SDUPTHER

## 2023-07-26 RX ORDER — DEXTROAMPHETAMINE SACCHARATE, AMPHETAMINE ASPARTATE, DEXTROAMPHETAMINE SULFATE AND AMPHETAMINE SULFATE 7.5; 7.5; 7.5; 7.5 MG/1; MG/1; MG/1; MG/1
TABLET ORAL
Qty: 60 TABLET | Refills: 0 | Status: SHIPPED | OUTPATIENT
Start: 2023-10-09 | End: 2023-10-18 | Stop reason: SDUPTHER

## 2023-07-26 RX ORDER — CLINDAMYCIN HYDROCHLORIDE 300 MG/1
300 CAPSULE ORAL EVERY 6 HOURS
Qty: 30 CAPSULE | Refills: 0 | Status: SHIPPED | OUTPATIENT
Start: 2023-07-26 | End: 2023-10-18

## 2023-07-26 RX ORDER — DEXTROAMPHETAMINE SACCHARATE, AMPHETAMINE ASPARTATE, DEXTROAMPHETAMINE SULFATE AND AMPHETAMINE SULFATE 7.5; 7.5; 7.5; 7.5 MG/1; MG/1; MG/1; MG/1
30 TABLET ORAL
Qty: 60 TABLET | Refills: 0 | Status: SHIPPED | OUTPATIENT
Start: 2023-08-11 | End: 2023-10-18 | Stop reason: SDUPTHER

## 2023-07-26 RX ORDER — ALPRAZOLAM 0.5 MG/1
0.5 TABLET ORAL 2 TIMES DAILY PRN
Qty: 60 TABLET | Refills: 2 | Status: SHIPPED | OUTPATIENT
Start: 2023-07-26 | End: 2023-10-18 | Stop reason: SDUPTHER

## 2023-07-26 NOTE — PROGRESS NOTES
Pt is a 58 y.o. female who presents for check up for   Encounter Diagnoses   Name Primary?    GIOVANA (generalized anxiety disorder)     Attention deficit hyperactivity disorder (ADHD), predominantly inattentive type     Tooth abscess Yes     History of present illness:   Sylvain Robison is a 58 y.o. female here for a checkup.  She lost her health insurance.  She is now getting most of her care at [facility].  She has a history of hypertension She takes hydrochlorothiazide 25 mg every morning.  She tolerates it well.  Her blood pressure is better.  She gets a lot of muscle cramps.  Patient has a history of gout in her right foot.  She's taking colchicine, allopurinol.  Her foot pain has improved.  She is doing very well on Adderall.  She is not having trouble sleeping. She is Handling stress at home very well.  She is not having side effects from the medication such as palpitations, anxiety attacks.  She is able to focus and stay on track and get projects finished.  Her  is being treated for lymphoma recurrence and is scheduled for bone marrow transplant.  He is doing better at home stress has improved.  Complains of neck pain with pain and numbness radiating into her right shoulder.  Comes and goes.  Gabapentin and Anaprox helps.    X-ray was reviewed.  She has facet joint hypertrophy and loss of disc space at C5-6.  She also takes gabapentin which helps but it makes her sleepy.  She takes it twice daily.  Having sciatica pain in left leg.  Tried neurontin which helped.  MRI shows lumbar facet arthropathy, mild lumbar spinal stenosis.  Gabapentin has been very effective.  She is not taking her Celebrex.  She gets a lot of pain in her left hip and leg at night.  Tramadol usually helps but not lately.  She received epidural sterile injections which helped a little.      Review of systems:  Gen.: No weight loss  HEENT: No headaches, sinus congestion, change in vision.  Sore throat in the morning  Cardiovascular: No  chest pain, palpitations  Respiratory: No cough, shortness of breath  Neurological: Sleeping well, no weakness, no syncope, normal memory, no seizure, no ticks  MSK:  Leg cramps    Physical exam:   Vitals:    07/26/23 1340   BP: 112/64   Pulse: 89   Resp: 18     Gen.: Well developed, well nourished white female in no apparent distress  HEENT: Pupils equal round reactive to light and accommodation, extraocular muscles intact, TMs are normal translucent mobile, neck is supple no lymphadenopathy no JVD, throat is clear.  Neck exam: Good range of motion.  Mild Spurling sign.  Cracking palpable.  Upper extremity strength equal and strong.  Upper extremity reflexes 2+ bilaterally   Heart:Cardiac exam revealed the PMI to be normally situated and sized. The rhythm was regular and no extrasystoles were noted during several minutes of auscultation. The first and second heart sounds were normal and physiologic splitting of the second heart sound was noted. There were no murmurs, rubs, clicks, or gallops.  Lungs:Lungs were clear to auscultation and percussion, and with normal diaphragmatic excursion. No wheezes or rales were noted.   Neuro: Neurologically, the patient was awake, alert, and oriented to person, place and time. There were no obvious focal neurologic abnormalities.  Extremities: No clubbing cyanosis or edema.      Lab Results   Component Value Date    LDLCALC 124.4 11/18/2022     BMP  Lab Results   Component Value Date     11/18/2022    K 3.7 11/18/2022     11/18/2022    CO2 28 11/18/2022    BUN 16 11/18/2022    CREATININE 1.0 11/18/2022    CALCIUM 10.0 11/18/2022    ANIONGAP 11 11/18/2022    ESTGFRAFRICA >60.0 05/02/2018    EGFRNONAA >60.0 05/02/2018       Lab Results   Component Value Date    URICACID 4.1 11/18/2022     Assessment/plan:     1. Tooth abscess  -     clindamycin (CLEOCIN) 300 MG capsule; Take 1 capsule (300 mg total) by mouth every 6 (six) hours.  Dispense: 30 capsule; Refill: 0    2.  GIOVANA (generalized anxiety disorder)  -     ALPRAZolam (XANAX) 0.5 MG tablet; Take 1 tablet (0.5 mg total) by mouth 2 (two) times daily as needed for Anxiety.  Dispense: 60 tablet; Refill: 2    3. Attention deficit hyperactivity disorder (ADHD), predominantly inattentive type  -     dextroamphetamine-amphetamine (ADDERALL) 30 mg Tab; TAKE ONE TABLET BY MOUTH IN THE MORNING AND ONE TABLET AT NOON  Dispense: 60 tablet; Refill: 0  -     dextroamphetamine-amphetamine (ADDERALL) 30 mg Tab; TAKE ONE TABLET BY MOUTH IN THE MORNING AND ONE TABLET AT NOON  Dispense: 60 tablet; Refill: 0  -     dextroamphetamine-amphetamine (ADDERALL) 30 mg Tab; TAKE ONE TABLET BY MOUTH IN THE MORNING AND ONE TABLET AT NOON  Dispense: 60 tablet; Refill: 0            Attention deficit hyperactivity disorder - stable on current medication  Continue Adderall  15 minute discussion with the patient regarding the importance of proper sleep hygiene was completed. The patientwill be encouraged to go to bed at the same time and wake up at the same time even on the weekends. The patient was encouraged to continue giving the medication even on weekends and holidays.    Cervical spondylolysis.    Continue mobic and gabapentin.     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  Dispense: 30 tablet; Refill: 0  -     colchicine (COLCRYS) 0.6 mg tablet; Take 1 tablet (0.6 mg total) by mouth once daily.  Dispense: 30 tablet; Refill: 5    The next appointment will be every 3 months.

## 2023-08-28 NOTE — TELEPHONE ENCOUNTER
----- Message from Anay Menjivar, RTR sent at 8/28/2023  1:52 PM CDT -----  Regarding: please check mammogram order  Orders were placed by your department for a Diagnostic Bilateral Mammogram and Right breast Ultrasound for right breast pain.    Patients last bilateral mammogram was done 2/2023, so we should be imaging her right breast only.    Please add orders for a Diagnostic Right Mammogram .    Thanks!     Refill Routing Note   Medication(s) are not appropriate for processing by [facility] for the following reason(s):       Required labs outdated    [facility] action(s):  Defer   Requires labs : Yes    Medication Therapy Plan: FOVS    Appointments  past 12m or future 3m with PCP    Date Provider   Last Visit   2/8/2023 Diego Day MD   Next Visit   5/3/2023 Diego Day MD   ED visits in past 90 days: 0        Note composed:3:12 AM 02/22/2023

## 2023-10-18 ENCOUNTER — OFFICE VISIT (OUTPATIENT)
Dept: INTERNAL MEDICINE | Facility: CLINIC | Age: 58
End: 2023-10-18

## 2023-10-18 VITALS
HEIGHT: 60 IN | BODY MASS INDEX: 34.02 KG/M2 | WEIGHT: 173.31 LBS | HEART RATE: 100 BPM | OXYGEN SATURATION: 96 % | DIASTOLIC BLOOD PRESSURE: 78 MMHG | RESPIRATION RATE: 18 BRPM | SYSTOLIC BLOOD PRESSURE: 122 MMHG

## 2023-10-18 DIAGNOSIS — M1A.0710 IDIOPATHIC CHRONIC GOUT OF RIGHT FOOT WITHOUT TOPHUS: ICD-10-CM

## 2023-10-18 DIAGNOSIS — I10 ESSENTIAL HYPERTENSION: ICD-10-CM

## 2023-10-18 DIAGNOSIS — R10.84 ABDOMINAL PAIN, GENERALIZED: Primary | ICD-10-CM

## 2023-10-18 DIAGNOSIS — M48.062 SPINAL STENOSIS OF LUMBAR REGION WITH NEUROGENIC CLAUDICATION: ICD-10-CM

## 2023-10-18 DIAGNOSIS — F41.1 GAD (GENERALIZED ANXIETY DISORDER): ICD-10-CM

## 2023-10-18 DIAGNOSIS — K21.00 GASTROESOPHAGEAL REFLUX DISEASE WITH ESOPHAGITIS WITHOUT HEMORRHAGE: ICD-10-CM

## 2023-10-18 DIAGNOSIS — Z12.31 ENCOUNTER FOR SCREENING MAMMOGRAM FOR MALIGNANT NEOPLASM OF BREAST: ICD-10-CM

## 2023-10-18 DIAGNOSIS — F90.0 ATTENTION DEFICIT HYPERACTIVITY DISORDER (ADHD), PREDOMINANTLY INATTENTIVE TYPE: ICD-10-CM

## 2023-10-18 PROCEDURE — 99999 PR PBB SHADOW E&M-EST. PATIENT-LVL IV: CPT | Mod: PBBFAC,,, | Performed by: FAMILY MEDICINE

## 2023-10-18 PROCEDURE — 99213 PR OFFICE/OUTPT VISIT, EST, LEVL III, 20-29 MIN: ICD-10-PCS | Mod: S$PBB,,, | Performed by: FAMILY MEDICINE

## 2023-10-18 PROCEDURE — 99999 PR PBB SHADOW E&M-EST. PATIENT-LVL IV: ICD-10-PCS | Mod: PBBFAC,,, | Performed by: FAMILY MEDICINE

## 2023-10-18 PROCEDURE — 99213 OFFICE O/P EST LOW 20 MIN: CPT | Mod: S$PBB,,, | Performed by: FAMILY MEDICINE

## 2023-10-18 PROCEDURE — 99214 OFFICE O/P EST MOD 30 MIN: CPT | Mod: PBBFAC,PN | Performed by: FAMILY MEDICINE

## 2023-10-18 RX ORDER — LOSARTAN POTASSIUM AND HYDROCHLOROTHIAZIDE 25; 100 MG/1; MG/1
1 TABLET ORAL DAILY
Qty: 30 TABLET | Refills: 5 | Status: SHIPPED | OUTPATIENT
Start: 2023-10-18 | End: 2024-03-26 | Stop reason: SDUPTHER

## 2023-10-18 RX ORDER — DEXTROAMPHETAMINE SACCHARATE, AMPHETAMINE ASPARTATE, DEXTROAMPHETAMINE SULFATE AND AMPHETAMINE SULFATE 7.5; 7.5; 7.5; 7.5 MG/1; MG/1; MG/1; MG/1
30 TABLET ORAL
Qty: 60 TABLET | Refills: 0 | Status: SHIPPED | OUTPATIENT
Start: 2024-01-05 | End: 2024-01-10 | Stop reason: SDUPTHER

## 2023-10-18 RX ORDER — PANTOPRAZOLE SODIUM 40 MG/1
TABLET, DELAYED RELEASE ORAL
Qty: 30 TABLET | Refills: 5 | Status: SHIPPED | OUTPATIENT
Start: 2023-10-18 | End: 2024-03-26 | Stop reason: SDUPTHER

## 2023-10-18 RX ORDER — DEXTROAMPHETAMINE SACCHARATE, AMPHETAMINE ASPARTATE, DEXTROAMPHETAMINE SULFATE AND AMPHETAMINE SULFATE 7.5; 7.5; 7.5; 7.5 MG/1; MG/1; MG/1; MG/1
TABLET ORAL
Qty: 60 TABLET | Refills: 0 | Status: SHIPPED | OUTPATIENT
Start: 2023-11-08 | End: 2024-01-10 | Stop reason: SDUPTHER

## 2023-10-18 RX ORDER — GABAPENTIN 300 MG/1
300 CAPSULE ORAL 2 TIMES DAILY
Qty: 60 CAPSULE | Refills: 5 | Status: SHIPPED | OUTPATIENT
Start: 2023-10-18 | End: 2024-03-26 | Stop reason: SDUPTHER

## 2023-10-18 RX ORDER — DEXTROAMPHETAMINE SACCHARATE, AMPHETAMINE ASPARTATE, DEXTROAMPHETAMINE SULFATE AND AMPHETAMINE SULFATE 7.5; 7.5; 7.5; 7.5 MG/1; MG/1; MG/1; MG/1
TABLET ORAL
Qty: 60 TABLET | Refills: 0 | Status: SHIPPED | OUTPATIENT
Start: 2023-12-07 | End: 2024-01-10 | Stop reason: SDUPTHER

## 2023-10-18 RX ORDER — ALLOPURINOL 300 MG/1
300 TABLET ORAL DAILY
Qty: 30 TABLET | Refills: 5 | Status: SHIPPED | OUTPATIENT
Start: 2023-10-18 | End: 2024-03-26 | Stop reason: SDUPTHER

## 2023-10-18 RX ORDER — ALPRAZOLAM 0.5 MG/1
0.5 TABLET ORAL 2 TIMES DAILY PRN
Qty: 60 TABLET | Refills: 2 | Status: SHIPPED | OUTPATIENT
Start: 2023-10-18 | End: 2024-01-10 | Stop reason: SDUPTHER

## 2023-10-18 NOTE — PROGRESS NOTES
Pt is a 58 y.o. female who presents for 3 month check up.    History of present illness:   Sylvain Robison is a 58 y.o. female here for a checkup.  She lost her health insurance.  She is now getting most of her care at [facility].  She has a history of hypertension She takes hydrochlorothiazide 25 mg every morning.  She tolerates it well.  Her blood pressure is better.  She gets a lot of muscle cramps.  Patient has a history of gout in her right foot.  She's taking allopurinol and has not had an acute attack. Uric acid WNL.   She is doing very well on Adderall.  She is not having trouble sleeping. She is Handling stress at home very well.  She is not having side effects from the medication such as palpitations, anxiety attacks.  She is able to focus and stay on track and get projects finished.  Her  is being treated for lymphoma recurrence and is scheduled for bone marrow transplant.  He is doing better at home stress has improved.  Xanax for anxiety and stress.     Neck pain and radiating numbness into right hand controlled with Gabapentin. X-ray was reviewed.  She has facet joint hypertrophy and loss of disc space at C5-6.  She reports biceps tendon rupture 3 months ago, which has improved, but gets occasional R shoulder pain.   LBP w/ sciatica pain in left leg controlled with Gabapentin twice daily and tylenol arthritis + motrin daily.   Not on  celebrex. Back pain better with forward flexion, leg and hip pain worse at night and with climbing stairs. MRI shows lumbar facet arthropathy, mild lumbar spinal stenosis. Tramadol usually helps but not lately.  She received 3 epidural sterile injections years ago, which helped a little.      She reports increased acid reflux despite taking Protonix 40 mg daily and cassandra-seltzer as needed. She reports burning pain comes on 2-3 hours after eating, worse with pastries (donuts, brownies, bread, steak);when eating at night, it comes on in the morning upon waking up. She also  burps. Getting post prandial abdominal pain.    Review of systems:  Gen.: No weight loss  HEENT: No headaches, sinus congestion, change in vision.  Sore throat in the morning  Cardiovascular: No chest pain, palpitations  Respiratory: No cough, shortness of breath  Neurological: Sleeping well, no weakness, no syncope, normal memory, no seizure, no ticks  MSK:  Leg cramps    Physical exam:   Vitals:    10/18/23 1316   BP: 122/78   Pulse: 100   Resp: 18     Gen.: Well developed, well nourished white female in no apparent distress  HEENT: Pupils equal round reactive to light and accommodation, extraocular muscles intact, TMs are normal translucent mobile, neck is supple no lymphadenopathy no JVD, throat is clear.  Neck exam: Good range of motion.  Mild Spurling sign.  Cracking palpable.  Upper extremity strength equal and strong.  Upper extremity reflexes 2+ bilaterally   Heart:Cardiac exam revealed the PMI to be normally situated and sized. The rhythm was regular and no extrasystoles were noted during several minutes of auscultation. The first and second heart sounds were normal and physiologic splitting of the second heart sound was noted. There were no murmurs, rubs, clicks, or gallops.  Lungs:Lungs were clear to auscultation and percussion, and with normal diaphragmatic excursion. No wheezes or rales were noted.   Neuro: Neurologically, the patient was awake, alert, and oriented to person, place and time. There were no obvious focal neurologic abnormalities.  Extremities: No clubbing cyanosis or edema.      Lab Results   Component Value Date    LDLCALC 124.4 11/18/2022     BMP  Lab Results   Component Value Date     11/18/2022    K 3.7 11/18/2022     11/18/2022    CO2 28 11/18/2022    BUN 16 11/18/2022    CREATININE 1.0 11/18/2022    CALCIUM 10.0 11/18/2022    ANIONGAP 11 11/18/2022    ESTGFRAFRICA >60.0 05/02/2018    EGFRNONAA >60.0 05/02/2018       Lab Results   Component Value Date    URICACID 4.1  11/18/2022     Assessment/plan:     1. Abdominal pain, generalized  -     US Abdomen Limited; Future; Expected date: 10/18/2023  -     FL Upper GI Air Contrast; Future; Expected date: 10/18/2023    2. Attention deficit hyperactivity disorder (ADHD), predominantly inattentive type  -     dextroamphetamine-amphetamine (ADDERALL) 30 mg Tab; Take1 tablet by mouth in the morning and 1 tablet at noon  Dispense: 60 tablet; Refill: 0  -     dextroamphetamine-amphetamine (ADDERALL) 30 mg Tab; TAKE ONE TABLET BY MOUTH IN THE MORNING AND ONE TABLET AT NOON  Dispense: 60 tablet; Refill: 0  -     dextroamphetamine-amphetamine (ADDERALL) 30 mg Tab; TAKE ONE TABLET BY MOUTH IN THE MORNING AND ONE TABLET AT NOON  Dispense: 60 tablet; Refill: 0    3. Essential hypertension  Overview:  Two gram sodium diet.    Weight loss discussed.    Try to walk 2 miles per day.    Quit smoking.    Current medications will be:  Losartan HCTZ 100/25 mg daily    Orders:  -     losartan-hydrochlorothiazide 100-25 mg (HYZAAR) 100-25 mg per tablet; Take 1 tablet by mouth once daily.  Dispense: 30 tablet; Refill: 5    4. GIOVANA (generalized anxiety disorder)  -     ALPRAZolam (XANAX) 0.5 MG tablet; Take 1 tablet (0.5 mg total) by mouth 2 (two) times daily as needed for Anxiety.  Dispense: 60 tablet; Refill: 2    5. Gastroesophageal reflux disease with esophagitis without hemorrhage  -     pantoprazole (PROTONIX) 40 MG tablet; TAKE 1 TABLET BY MOUTH ONCE DAILY  Dispense: 30 tablet; Refill: 5    6. 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  Dispense: 30 tablet; Refill: 5    7. Spinal stenosis of lumbar region with neurogenic claudication  -     gabapentin (NEURONTIN) 300 MG capsule; Take 1 capsule (300 mg total) by mouth 2 (two) times daily.  Dispense: 60 capsule; Refill: 5    8. Encounter for screening mammogram for malignant neoplasm of breast  -      Mammo Digital Screening Bilat w/ Rohit; Future; Expected date: 10/18/2023              Attention deficit hyperactivity disorder - stable on current medication  Continue Adderall  15 minute discussion with the patient regarding the importance of proper sleep hygiene was completed. The patientwill be encouraged to go to bed at the same time and wake up at the same time even on the weekends. The patient was encouraged to continue giving the medication even on weekends and holidays.    Cervical spondylolysis.    Continue mobic and gabapentin.     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  Dispense: 30 tablet; Refill: 0  -     colchicine (COLCRYS) 0.6 mg tablet; Take 1 tablet (0.6 mg total) by mouth once daily.  Dispense: 30 tablet; Refill: 5    The next appointment will be every 3 months.

## 2023-10-31 DIAGNOSIS — K29.30 CHRONIC SUPERFICIAL GASTRITIS WITHOUT BLEEDING: Primary | ICD-10-CM

## 2023-11-13 DIAGNOSIS — G25.81 RESTLESS LEGS SYNDROME: ICD-10-CM

## 2023-11-13 NOTE — TELEPHONE ENCOUNTER
Refill Routing Note   Medication(s) are not appropriate for processing by [facility] for the following reason(s):      Medication outside of protocol    [facility] action(s):  Route Care Due:  None identified              Appointments  past 12m or future 3m with PCP    Date Provider   Last Visit   10/18/2023 Diego Day MD   Next Visit   1/10/2024 Diego Day MD   ED visits in past 90 days: 0        Note composed:4:25 PM 11/13/2023

## 2023-11-14 RX ORDER — PRAMIPEXOLE DIHYDROCHLORIDE 1 MG/1
1 TABLET ORAL NIGHTLY
Qty: 30 TABLET | Refills: 5 | Status: SHIPPED | OUTPATIENT
Start: 2023-11-14 | End: 2024-03-26 | Stop reason: SDUPTHER

## 2023-11-29 DIAGNOSIS — I10 ESSENTIAL HYPERTENSION: ICD-10-CM

## 2023-12-08 DIAGNOSIS — R25.2 MUSCLE CRAMPS: ICD-10-CM

## 2023-12-09 NOTE — TELEPHONE ENCOUNTER
Care Due:                  Date            Visit Type   Department     Provider  --------------------------------------------------------------------------------                                EP -                              PRIMARY      [facility] CHING Blevins  Last Visit: 10-      CARE ([facility])   MEDICINE       Heuna                              [facility]                              PRIMARY      [facility] CHING Blevins  Next Visit: 01-      [facility] ([facility]                                                            Last  Test          Frequency    Reason                     Performed    Due Date  --------------------------------------------------------------------------------    CBC.........  12 months..  allopurinoL..............  Not Found    Overdue    CMP.........  12 months..  allopurinoL,               11- 11-                             losartan-hydrochlorothiaz                             renate, potassium...........    Uric Acid...  12 months..  allopurinoL..............  11- 11-    [facility] Embedded Care Due Messages. Reference number: [id number].   12/09/2023 11:48:35 AM CST

## 2023-12-10 RX ORDER — POTASSIUM CHLORIDE 20 MEQ/1
20 TABLET, EXTENDED RELEASE ORAL DAILY
Qty: 90 TABLET | Refills: 1 | Status: SHIPPED | OUTPATIENT
Start: 2023-12-10 | End: 2024-03-26 | Stop reason: SDUPTHER

## 2023-12-10 NOTE — TELEPHONE ENCOUNTER
Refill Routing Note   Medication(s) are not appropriate for processing by [facility] for the following reason(s):        Required labs outdated    [facility] action(s):  Defer     Requires labs : Yes             Appointments  past 12m or future 3m with PCP    Date Provider   Last Visit   10/18/2023 Diego Day MD   Next Visit   1/10/2024 Diego Day MD   ED visits in past 90 days: 0        Note composed:6:26 PM 12/09/2023

## 2024-01-10 ENCOUNTER — OFFICE VISIT (OUTPATIENT)
Dept: INTERNAL MEDICINE | Facility: CLINIC | Age: 59
End: 2024-01-10

## 2024-01-10 VITALS
WEIGHT: 173.75 LBS | HEART RATE: 98 BPM | BODY MASS INDEX: 34.11 KG/M2 | HEIGHT: 60 IN | SYSTOLIC BLOOD PRESSURE: 138 MMHG | DIASTOLIC BLOOD PRESSURE: 80 MMHG | OXYGEN SATURATION: 98 % | RESPIRATION RATE: 20 BRPM

## 2024-01-10 DIAGNOSIS — F90.0 ATTENTION DEFICIT HYPERACTIVITY DISORDER (ADHD), PREDOMINANTLY INATTENTIVE TYPE: ICD-10-CM

## 2024-01-10 DIAGNOSIS — I10 ESSENTIAL HYPERTENSION: ICD-10-CM

## 2024-01-10 DIAGNOSIS — K21.00 GASTROESOPHAGEAL REFLUX DISEASE WITH ESOPHAGITIS WITHOUT HEMORRHAGE: Primary | ICD-10-CM

## 2024-01-10 DIAGNOSIS — F41.1 GAD (GENERALIZED ANXIETY DISORDER): ICD-10-CM

## 2024-01-10 PROCEDURE — 99213 OFFICE O/P EST LOW 20 MIN: CPT | Mod: S$PBB,,, | Performed by: FAMILY MEDICINE

## 2024-01-10 PROCEDURE — 99214 OFFICE O/P EST MOD 30 MIN: CPT | Mod: PBBFAC,PN | Performed by: FAMILY MEDICINE

## 2024-01-10 PROCEDURE — 99999 PR PBB SHADOW E&M-EST. PATIENT-LVL IV: CPT | Mod: PBBFAC,,, | Performed by: FAMILY MEDICINE

## 2024-01-10 RX ORDER — DEXTROAMPHETAMINE SACCHARATE, AMPHETAMINE ASPARTATE, DEXTROAMPHETAMINE SULFATE AND AMPHETAMINE SULFATE 7.5; 7.5; 7.5; 7.5 MG/1; MG/1; MG/1; MG/1
TABLET ORAL
Qty: 60 TABLET | Refills: 0 | Status: SHIPPED | OUTPATIENT
Start: 2024-03-04

## 2024-01-10 RX ORDER — DEXTROAMPHETAMINE SACCHARATE, AMPHETAMINE ASPARTATE, DEXTROAMPHETAMINE SULFATE AND AMPHETAMINE SULFATE 7.5; 7.5; 7.5; 7.5 MG/1; MG/1; MG/1; MG/1
30 TABLET ORAL
Qty: 60 TABLET | Refills: 0 | Status: SHIPPED | OUTPATIENT
Start: 2024-04-03

## 2024-01-10 RX ORDER — ALPRAZOLAM 0.5 MG/1
0.5 TABLET ORAL 2 TIMES DAILY PRN
Qty: 60 TABLET | Refills: 2 | Status: SHIPPED | OUTPATIENT
Start: 2024-01-10

## 2024-01-10 RX ORDER — DEXTROAMPHETAMINE SACCHARATE, AMPHETAMINE ASPARTATE, DEXTROAMPHETAMINE SULFATE AND AMPHETAMINE SULFATE 7.5; 7.5; 7.5; 7.5 MG/1; MG/1; MG/1; MG/1
TABLET ORAL
Qty: 60 TABLET | Refills: 0 | Status: SHIPPED | OUTPATIENT
Start: 2024-02-05

## 2024-01-10 NOTE — PROGRESS NOTES
Pt is a 58 y.o. female who presents for 3 month check up.    History of present illness:   Sylvain Robison is a 58 y.o. female here for a checkup.  She lost her health insurance.  She is now getting most of her care at [facility].  She has a history of hypertension She takes hydrochlorothiazide 25 mg every morning.  She tolerates it well.  Her blood pressure is better.  She gets a lot of muscle cramps.  Patient has a history of gout in her right foot.  She's taking allopurinol and has not had an acute attack. Uric acid WNL.   She is doing very well on Adderall.  She is not having trouble sleeping. She is Handling stress at home very well.  She is not having side effects from the medication such as palpitations, anxiety attacks.  She is able to focus and stay on track and get projects finished.  Her  is being treated for lymphoma recurrence and is scheduled for bone marrow transplant.  He is doing better at home stress has improved.  Xanax for anxiety and stress.   Neck pain and radiating numbness into right hand controlled with Gabapentin. X-ray was reviewed.  She has facet joint hypertrophy and loss of disc space at C5-6.  She reports biceps tendon rupture 3 months ago, which has improved, but gets occasional R shoulder pain.   LBP w/ sciatica pain in left leg controlled with Gabapentin twice daily and tylenol arthritis + motrin daily.   Not on  celebrex. Back pain better with forward flexion, leg and hip pain worse at night and with climbing stairs. MRI shows lumbar facet arthropathy, mild lumbar spinal stenosis. Tramadol usually helps but not lately.  She received 3 epidural sterile injections years ago, which helped a little.    She reports increased acid reflux despite taking Protonix 40 mg daily and cassandra-seltzer as needed. She reports burning pain comes on 2-3 hours after eating, worse with pastries (donuts, brownies, bread, steak);when eating at night, it comes on in the morning upon waking up. She also  burps. Getting post prandial abdominal pain.    Review of systems:  Gen.: No weight loss  HEENT: No headaches, sinus congestion, change in vision.  Sore throat in the morning  Cardiovascular: No chest pain, palpitations  Respiratory: No cough, shortness of breath  Neurological: Sleeping well, no weakness, no syncope, normal memory, no seizure, no ticks  MSK:  Leg cramps    Physical exam:   Vitals:    01/10/24 1304   BP: 138/80   Pulse: 98   Resp: 20     Gen.: Well developed, well nourished white female in no apparent distress  HEENT: Pupils equal round reactive to light and accommodation, extraocular muscles intact, TMs are normal translucent mobile, neck is supple no lymphadenopathy no JVD, throat is clear.  Neck exam: Good range of motion.  Mild Spurling sign.  Cracking palpable.  Upper extremity strength equal and strong.  Upper extremity reflexes 2+ bilaterally   Heart:Cardiac exam revealed the PMI to be normally situated and sized. The rhythm was regular and no extrasystoles were noted during several minutes of auscultation. The first and second heart sounds were normal and physiologic splitting of the second heart sound was noted. There were no murmurs, rubs, clicks, or gallops.  Lungs:Lungs were clear to auscultation and percussion, and with normal diaphragmatic excursion. No wheezes or rales were noted.   Neuro: Neurologically, the patient was awake, alert, and oriented to person, place and time. There were no obvious focal neurologic abnormalities.  Extremities: No clubbing cyanosis or edema.      Lab Results   Component Value Date    LDLCALC 124.4 11/18/2022     BMP  Lab Results   Component Value Date     11/18/2022    K 3.7 11/18/2022     11/18/2022    CO2 28 11/18/2022    BUN 16 11/18/2022    CREATININE 1.0 11/18/2022    CALCIUM 10.0 11/18/2022    ANIONGAP 11 11/18/2022    ESTGFRAFRICA >60.0 05/02/2018    EGFRNONAA >60.0 05/02/2018       Lab Results   Component Value Date    URICACID 4.1  11/18/2022     Assessment/plan:     1. Gastroesophageal reflux disease with esophagitis without hemorrhage  Overview:  Continue Protonix 40 mg daily      2. GIOVANA (generalized anxiety disorder)  -     ALPRAZolam (XANAX) 0.5 MG tablet; Take 1 tablet (0.5 mg total) by mouth 2 (two) times daily as needed for Anxiety.  Dispense: 60 tablet; Refill: 2    3. Essential hypertension  Overview:  Two gram sodium diet.    Weight loss discussed.    Try to walk 2 miles per day.    Quit smoking.    Current medications will be:  Losartan HCTZ 100/25 mg daily      4. Attention deficit hyperactivity disorder (ADHD), predominantly inattentive type  -     dextroamphetamine-amphetamine (ADDERALL) 30 mg Tab; TAKE ONE TABLET BY MOUTH IN THE MORNING AND ONE TABLET AT NOON  Dispense: 60 tablet; Refill: 0  -     dextroamphetamine-amphetamine (ADDERALL) 30 mg Tab; TAKE ONE TABLET BY MOUTH IN THE MORNING AND ONE TABLET AT NOON  Dispense: 60 tablet; Refill: 0  -     dextroamphetamine-amphetamine (ADDERALL) 30 mg Tab; Take1 tablet by mouth in the morning and 1 tablet at noon  Dispense: 60 tablet; Refill: 0    Attention deficit hyperactivity disorder - stable on current medication  Continue Adderall  15 minute discussion with the patient regarding the importance of proper sleep hygiene was completed. The patientwill be encouraged to go to bed at the same time and wake up at the same time even on the weekends. The patient was encouraged to continue giving the medication even on weekends and holidays.    Cervical spondylolysis.    Continue mobic and gabapentin.     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    -     colchicine (COLCRYS) 0.6 mg tablet;  Take 1 tablet (0.6 mg total) by mouth once daily.      The next appointment will be every 3 months.

## 2024-03-26 ENCOUNTER — OFFICE VISIT (OUTPATIENT)
Dept: INTERNAL MEDICINE | Facility: CLINIC | Age: 59
End: 2024-03-26

## 2024-03-26 VITALS
BODY MASS INDEX: 34.37 KG/M2 | SYSTOLIC BLOOD PRESSURE: 135 MMHG | HEART RATE: 80 BPM | DIASTOLIC BLOOD PRESSURE: 80 MMHG | WEIGHT: 175.06 LBS | RESPIRATION RATE: 19 BRPM | HEIGHT: 60 IN

## 2024-03-26 DIAGNOSIS — R25.2 MUSCLE CRAMPS: ICD-10-CM

## 2024-03-26 DIAGNOSIS — I10 ESSENTIAL HYPERTENSION: ICD-10-CM

## 2024-03-26 DIAGNOSIS — K21.00 GASTROESOPHAGEAL REFLUX DISEASE WITH ESOPHAGITIS WITHOUT HEMORRHAGE: ICD-10-CM

## 2024-03-26 DIAGNOSIS — F90.0 ATTENTION DEFICIT HYPERACTIVITY DISORDER (ADHD), PREDOMINANTLY INATTENTIVE TYPE: ICD-10-CM

## 2024-03-26 DIAGNOSIS — M48.062 SPINAL STENOSIS OF LUMBAR REGION WITH NEUROGENIC CLAUDICATION: ICD-10-CM

## 2024-03-26 DIAGNOSIS — G25.81 RESTLESS LEGS SYNDROME: ICD-10-CM

## 2024-03-26 DIAGNOSIS — M1A.0710 IDIOPATHIC CHRONIC GOUT OF RIGHT FOOT WITHOUT TOPHUS: ICD-10-CM

## 2024-03-26 DIAGNOSIS — F41.1 GAD (GENERALIZED ANXIETY DISORDER): ICD-10-CM

## 2024-03-26 PROCEDURE — 99214 OFFICE O/P EST MOD 30 MIN: CPT | Mod: PBBFAC,PN | Performed by: FAMILY MEDICINE

## 2024-03-26 PROCEDURE — 99999 PR PBB SHADOW E&M-EST. PATIENT-LVL IV: CPT | Mod: PBBFAC,,, | Performed by: FAMILY MEDICINE

## 2024-03-26 PROCEDURE — 99213 OFFICE O/P EST LOW 20 MIN: CPT | Mod: S$PBB,,, | Performed by: FAMILY MEDICINE

## 2024-03-26 RX ORDER — DEXTROAMPHETAMINE SACCHARATE, AMPHETAMINE ASPARTATE, DEXTROAMPHETAMINE SULFATE AND AMPHETAMINE SULFATE 7.5; 7.5; 7.5; 7.5 MG/1; MG/1; MG/1; MG/1
TABLET ORAL
Qty: 60 TABLET | Refills: 0 | Status: SHIPPED | OUTPATIENT
Start: 2024-04-07 | End: 2024-06-19 | Stop reason: SDUPTHER

## 2024-03-26 RX ORDER — DEXTROAMPHETAMINE SACCHARATE, AMPHETAMINE ASPARTATE, DEXTROAMPHETAMINE SULFATE AND AMPHETAMINE SULFATE 7.5; 7.5; 7.5; 7.5 MG/1; MG/1; MG/1; MG/1
30 TABLET ORAL
Qty: 60 TABLET | Refills: 0 | Status: SHIPPED | OUTPATIENT
Start: 2024-05-06 | End: 2024-06-19 | Stop reason: SDUPTHER

## 2024-03-26 RX ORDER — DEXTROAMPHETAMINE SACCHARATE, AMPHETAMINE ASPARTATE, DEXTROAMPHETAMINE SULFATE AND AMPHETAMINE SULFATE 7.5; 7.5; 7.5; 7.5 MG/1; MG/1; MG/1; MG/1
TABLET ORAL
Qty: 60 TABLET | Refills: 0 | Status: SHIPPED | OUTPATIENT
Start: 2024-06-05 | End: 2024-06-19 | Stop reason: SDUPTHER

## 2024-03-26 RX ORDER — PANTOPRAZOLE SODIUM 40 MG/1
TABLET, DELAYED RELEASE ORAL
Qty: 30 TABLET | Refills: 5 | Status: SHIPPED | OUTPATIENT
Start: 2024-03-26

## 2024-03-26 RX ORDER — LOSARTAN POTASSIUM AND HYDROCHLOROTHIAZIDE 25; 100 MG/1; MG/1
1 TABLET ORAL DAILY
Qty: 30 TABLET | Refills: 5 | Status: SHIPPED | OUTPATIENT
Start: 2024-03-26 | End: 2025-03-26

## 2024-03-26 RX ORDER — GABAPENTIN 300 MG/1
300 CAPSULE ORAL 2 TIMES DAILY
Qty: 60 CAPSULE | Refills: 5 | Status: SHIPPED | OUTPATIENT
Start: 2024-03-26

## 2024-03-26 RX ORDER — PRAMIPEXOLE DIHYDROCHLORIDE 1 MG/1
1 TABLET ORAL NIGHTLY
Qty: 30 TABLET | Refills: 5 | Status: SHIPPED | OUTPATIENT
Start: 2024-03-26

## 2024-03-26 RX ORDER — POTASSIUM CHLORIDE 20 MEQ/1
20 TABLET, EXTENDED RELEASE ORAL DAILY
Qty: 90 TABLET | Refills: 1 | Status: SHIPPED | OUTPATIENT
Start: 2024-03-26

## 2024-03-26 RX ORDER — ALLOPURINOL 300 MG/1
300 TABLET ORAL DAILY
Qty: 30 TABLET | Refills: 5 | Status: SHIPPED | OUTPATIENT
Start: 2024-03-26

## 2024-03-26 RX ORDER — ALPRAZOLAM 0.5 MG/1
0.5 TABLET ORAL 2 TIMES DAILY PRN
Qty: 60 TABLET | Refills: 2 | Status: SHIPPED | OUTPATIENT
Start: 2024-03-26 | End: 2024-06-19 | Stop reason: SDUPTHER

## 2024-03-26 NOTE — PROGRESS NOTES
Pt is a 59 y.o. female who presents for 3 month check up.    History of present illness:   Sylvain Robison is a 59 y.o. female here for a checkup.  She lost her health insurance.  She is now getting most of her care at [facility].  She has a history of hypertension She takes hydrochlorothiazide 25 mg every morning.  She tolerates it well.  Her blood pressure is better.  She gets a lot of muscle cramps.  Patient has a history of gout in her right foot.  She's taking allopurinol and has not had an acute attack. Uric acid WNL.   She is doing very well on Adderall.  She is not having trouble sleeping. She is Handling stress at home very well.  She is not having side effects from the medication such as palpitations, anxiety attacks.  She is able to focus and stay on track and get projects finished.  Her  is being treated for lymphoma recurrence and is scheduled for bone marrow transplant.  He is doing better at home stress has improved.  Xanax for anxiety and stress.   Neck pain and radiating numbness into right hand controlled with Gabapentin. X-ray was reviewed.  She has facet joint hypertrophy and loss of disc space at C5-6.  LBP w/ sciatica pain in left leg controlled with Gabapentin twice daily and tylenol arthritis + motrin daily.   Not on  celebrex. Back pain better with forward flexion, leg and hip pain worse at night and with climbing stairs. MRI shows lumbar facet arthropathy, mild lumbar spinal stenosis. Tramadol usually helps but not lately.  She received 3 epidural sterile injections years ago, which helped a little.    She reports increased acid reflux despite taking Protonix 40 mg daily and cassandra-seltzer as needed. She reports burning pain comes on 2-3 hours after eating, worse with pastries (donuts, brownies, bread, steak);when eating at night, it comes on in the morning upon waking up. She also burps. Getting post prandial abdominal pain.    Review of systems:  Gen.: No weight loss  HEENT: No headaches,  sinus congestion, change in vision.  Sore throat in the morning  Cardiovascular: No chest pain, palpitations  Respiratory: No cough, shortness of breath  Neurological: Sleeping well, no weakness, no syncope, normal memory, no seizure, no ticks  MSK:  Leg cramps    Physical exam:   Vitals:    03/26/24 1337   BP: 135/80   Pulse: 80   Resp:      Gen.: Well developed, well nourished white female in no apparent distress  HEENT: Pupils equal round reactive to light and accommodation, extraocular muscles intact, TMs are normal translucent mobile, neck is supple no lymphadenopathy no JVD, throat is clear.  Neck exam: Good range of motion.  Mild Spurling sign.  Cracking palpable.  Upper extremity strength equal and strong.  Upper extremity reflexes 2+ bilaterally   Heart:Cardiac exam revealed the PMI to be normally situated and sized. The rhythm was regular and no extrasystoles were noted during several minutes of auscultation. The first and second heart sounds were normal and physiologic splitting of the second heart sound was noted. There were no murmurs, rubs, clicks, or gallops.  Lungs:Lungs were clear to auscultation and percussion, and with normal diaphragmatic excursion. No wheezes or rales were noted.   Neuro: Neurologically, the patient was awake, alert, and oriented to person, place and time. There were no obvious focal neurologic abnormalities.  Extremities: No clubbing cyanosis or edema.      Lab Results   Component Value Date    LDLCALC 124.4 11/18/2022     BMP  Lab Results   Component Value Date     11/18/2022    K 3.7 11/18/2022     11/18/2022    CO2 28 11/18/2022    BUN 16 11/18/2022    CREATININE 1.0 11/18/2022    CALCIUM 10.0 11/18/2022    ANIONGAP 11 11/18/2022    ESTGFRAFRICA >60.0 05/02/2018    EGFRNONAA >60.0 05/02/2018       Lab Results   Component Value Date    URICACID 4.1 11/18/2022     Assessment/plan:     1. Attention deficit hyperactivity disorder (ADHD), predominantly inattentive  type  -     dextroamphetamine-amphetamine (ADDERALL) 30 mg Tab; TAKE ONE TABLET BY MOUTH IN THE MORNING AND ONE TABLET AT NOON  Dispense: 60 tablet; Refill: 0  -     dextroamphetamine-amphetamine (ADDERALL) 30 mg Tab; Take1 tablet by mouth in the morning and 1 tablet at noon  Dispense: 60 tablet; Refill: 0  -     dextroamphetamine-amphetamine (ADDERALL) 30 mg Tab; TAKE ONE TABLET BY MOUTH IN THE MORNING AND ONE TABLET AT NOON  Dispense: 60 tablet; Refill: 0    2. Essential hypertension  Overview:  Two gram sodium diet.    Weight loss discussed.    Try to walk 2 miles per day.    Quit smoking.    Current medications will be:  Losartan HCTZ 100/25 mg daily    Orders:  -     losartan-hydrochlorothiazide 100-25 mg (HYZAAR) 100-25 mg per tablet; Take 1 tablet by mouth once daily.  Dispense: 30 tablet; Refill: 5  -     Comprehensive Metabolic Panel; Future; Expected date: 03/26/2024  -     Lipid Panel; Future; Expected date: 03/26/2024    3. GIOVANA (generalized anxiety disorder)  -     ALPRAZolam (XANAX) 0.5 MG tablet; Take 1 tablet (0.5 mg total) by mouth 2 (two) times daily as needed for Anxiety.  Dispense: 60 tablet; Refill: 2    4. Gastroesophageal reflux disease with esophagitis without hemorrhage  Overview:  Continue Protonix 40 mg daily    Orders:  -     pantoprazole (PROTONIX) 40 MG tablet; TAKE 1 TABLET BY MOUTH ONCE DAILY  Dispense: 30 tablet; Refill: 5    5. 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  Dispense: 30 tablet; Refill: 5    6. Restless legs syndrome  -     pramipexole (MIRAPEX) 1 MG tablet; Take 1 tablet (1 mg total) by mouth every evening.  Dispense: 30 tablet; Refill: 5    7. Spinal stenosis of lumbar region with neurogenic claudication  -     gabapentin (NEURONTIN) 300 MG capsule; Take 1 capsule (300 mg total) by mouth 2 (two) times daily.  Dispense: 60 capsule; Refill: 5    8. Muscle cramps  -      potassium chloride SA (K-DUR,KLOR-CON) 20 MEQ tablet; Take 1 tablet (20 mEq total) by mouth once daily.  Dispense: 90 tablet; Refill: 1      Attention deficit hyperactivity disorder - stable on current medication  Continue Adderall  15 minute discussion with the patient regarding the importance of proper sleep hygiene was completed. The patientwill be encouraged to go to bed at the same time and wake up at the same time even on the weekends. The patient was encouraged to continue giving the medication even on weekends and holidays.    Cervical spondylolysis.    Continue mobic and gabapentin.     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    -     colchicine (COLCRYS) 0.6 mg tablet; Take 1 tablet (0.6 mg total) by mouth once daily.      The next appointment will be every 3 months.

## 2024-04-02 ENCOUNTER — CLINICAL SUPPORT (OUTPATIENT)
Dept: INTERNAL MEDICINE | Facility: CLINIC | Age: 59
End: 2024-04-02

## 2024-04-02 DIAGNOSIS — I10 ESSENTIAL HYPERTENSION: ICD-10-CM

## 2024-04-02 LAB
ALBUMIN SERPL BCP-MCNC: 3.3 G/DL (ref 3.5–5.2)
ALP SERPL-CCNC: 109 U/L (ref 55–135)
ALT SERPL W/O P-5'-P-CCNC: 16 U/L (ref 10–44)
ANION GAP SERPL CALC-SCNC: 12 MMOL/L (ref 8–16)
AST SERPL-CCNC: 26 U/L (ref 10–40)
BILIRUB SERPL-MCNC: 0.2 MG/DL (ref 0.1–1)
BUN SERPL-MCNC: 17 MG/DL (ref 8–23)
CALCIUM SERPL-MCNC: 9.8 MG/DL (ref 8.7–10.5)
CHLORIDE SERPL-SCNC: 105 MMOL/L (ref 95–110)
CHOLEST SERPL-MCNC: 194 MG/DL (ref 120–199)
CHOLEST/HDLC SERPL: 5.7 {RATIO} (ref 2–5)
CO2 SERPL-SCNC: 24 MMOL/L (ref 23–29)
CREAT SERPL-MCNC: 0.9 MG/DL (ref 0.5–1.4)
EST. GFR  (NO RACE VARIABLE): >60 ML/MIN/1.73 M^2
GLUCOSE SERPL-MCNC: 114 MG/DL (ref 70–110)
HDLC SERPL-MCNC: 34 MG/DL (ref 40–75)
HDLC SERPL: 17.5 % (ref 20–50)
LDLC SERPL CALC-MCNC: 108 MG/DL (ref 63–159)
NONHDLC SERPL-MCNC: 160 MG/DL
POTASSIUM SERPL-SCNC: 4 MMOL/L (ref 3.5–5.1)
PROT SERPL-MCNC: 7.4 G/DL (ref 6–8.4)
SODIUM SERPL-SCNC: 141 MMOL/L (ref 136–145)
TRIGL SERPL-MCNC: 260 MG/DL (ref 30–150)

## 2024-04-02 PROCEDURE — 80053 COMPREHEN METABOLIC PANEL: CPT | Performed by: FAMILY MEDICINE

## 2024-04-02 PROCEDURE — 80061 LIPID PANEL: CPT | Performed by: FAMILY MEDICINE

## 2024-06-19 ENCOUNTER — OFFICE VISIT (OUTPATIENT)
Dept: INTERNAL MEDICINE | Facility: CLINIC | Age: 59
End: 2024-06-19

## 2024-06-19 VITALS
RESPIRATION RATE: 20 BRPM | BODY MASS INDEX: 33.76 KG/M2 | HEART RATE: 97 BPM | OXYGEN SATURATION: 98 % | WEIGHT: 171.94 LBS | HEIGHT: 60 IN

## 2024-06-19 DIAGNOSIS — I10 ESSENTIAL HYPERTENSION: Primary | ICD-10-CM

## 2024-06-19 DIAGNOSIS — F90.0 ATTENTION DEFICIT HYPERACTIVITY DISORDER (ADHD), PREDOMINANTLY INATTENTIVE TYPE: ICD-10-CM

## 2024-06-19 DIAGNOSIS — F41.1 GAD (GENERALIZED ANXIETY DISORDER): ICD-10-CM

## 2024-06-19 DIAGNOSIS — M1A.9XX0 CHRONIC GOUT WITHOUT TOPHUS, UNSPECIFIED CAUSE, UNSPECIFIED SITE: ICD-10-CM

## 2024-06-19 PROCEDURE — 99214 OFFICE O/P EST MOD 30 MIN: CPT | Mod: PBBFAC,PN | Performed by: FAMILY MEDICINE

## 2024-06-19 PROCEDURE — 99213 OFFICE O/P EST LOW 20 MIN: CPT | Mod: S$PBB,,, | Performed by: FAMILY MEDICINE

## 2024-06-19 PROCEDURE — 99999 PR PBB SHADOW E&M-EST. PATIENT-LVL IV: CPT | Mod: PBBFAC,,, | Performed by: FAMILY MEDICINE

## 2024-06-19 RX ORDER — DEXTROAMPHETAMINE SACCHARATE, AMPHETAMINE ASPARTATE, DEXTROAMPHETAMINE SULFATE AND AMPHETAMINE SULFATE 7.5; 7.5; 7.5; 7.5 MG/1; MG/1; MG/1; MG/1
TABLET ORAL
Qty: 60 TABLET | Refills: 0 | Status: SHIPPED | OUTPATIENT
Start: 2024-09-02

## 2024-06-19 RX ORDER — DEXTROAMPHETAMINE SACCHARATE, AMPHETAMINE ASPARTATE, DEXTROAMPHETAMINE SULFATE AND AMPHETAMINE SULFATE 7.5; 7.5; 7.5; 7.5 MG/1; MG/1; MG/1; MG/1
30 TABLET ORAL
Qty: 60 TABLET | Refills: 0 | Status: SHIPPED | OUTPATIENT
Start: 2024-07-03

## 2024-06-19 RX ORDER — ALPRAZOLAM 0.5 MG/1
0.5 TABLET ORAL 2 TIMES DAILY PRN
Qty: 60 TABLET | Refills: 2 | Status: SHIPPED | OUTPATIENT
Start: 2024-06-19

## 2024-06-19 RX ORDER — DEXTROAMPHETAMINE SACCHARATE, AMPHETAMINE ASPARTATE, DEXTROAMPHETAMINE SULFATE AND AMPHETAMINE SULFATE 7.5; 7.5; 7.5; 7.5 MG/1; MG/1; MG/1; MG/1
TABLET ORAL
Qty: 60 TABLET | Refills: 0 | Status: SHIPPED | OUTPATIENT
Start: 2024-08-02

## 2024-06-19 NOTE — PROGRESS NOTES
Pt is a 59 y.o. female who presents for 3 month check up.    History of present illness:   Sylvain Robison is a 59 y.o. female here for a checkup.  She lost her health insurance.  She is now getting most of her care at [facility].  She has a history of hypertension She takes hydrochlorothiazide 25 mg every morning.  She tolerates it well.  Her blood pressure is better.  She gets a lot of muscle cramps.  Patient has a history of gout in her right foot.  She's taking allopurinol and has not had an acute attack. Uric acid WNL.   She is doing very well on Adderall.  She is not having trouble sleeping. She is Handling stress at home very well.  She is not having side effects from the medication such as palpitations, anxiety attacks.  She is able to focus and stay on track and get projects finished.  Her  is being treated for lymphoma recurrence and is scheduled for bone marrow transplant.  He is doing better at home stress has improved.  Xanax for anxiety and stress.   Neck pain and radiating numbness into right hand controlled with Gabapentin. X-ray was reviewed.  She has facet joint hypertrophy and loss of disc space at C5-6.  LBP w/ sciatica pain in left leg controlled with Gabapentin twice daily and tylenol arthritis + motrin daily.   Not on  celebrex. Back pain better with forward flexion, leg and hip pain worse at night and with climbing stairs. MRI shows lumbar facet arthropathy, mild lumbar spinal stenosis. Tramadol usually helps but not lately.  She received 3 epidural sterile injections years ago, which helped a little.    She reports increased acid reflux despite taking Protonix 40 mg daily and cassandra-seltzer as needed. She reports burning pain comes on 2-3 hours after eating, worse with pastries (donuts, brownies, bread, steak);when eating at night, it comes on in the morning upon waking up. She also burps. Getting post prandial abdominal pain.    Review of systems:  Gen.: No weight loss  HEENT: No headaches,  sinus congestion, change in vision.  Sore throat in the morning  Cardiovascular: No chest pain, palpitations  Respiratory: No cough, shortness of breath  Neurological: Sleeping well, no weakness, no syncope, normal memory, no seizure, no ticks  MSK:  Leg cramps    Physical exam:   Vitals:    06/19/24 1309   BP: (P) 130/86   Pulse: 97   Resp: 20     Gen.: Well developed, well nourished white female in no apparent distress  HEENT: Pupils equal round reactive to light and accommodation, extraocular muscles intact, TMs are normal translucent mobile, neck is supple no lymphadenopathy no JVD, throat is clear.  Neck exam: Good range of motion.  Mild Spurling sign.  Cracking palpable.  Upper extremity strength equal and strong.  Upper extremity reflexes 2+ bilaterally   Heart:Cardiac exam revealed the PMI to be normally situated and sized. The rhythm was regular and no extrasystoles were noted during several minutes of auscultation. The first and second heart sounds were normal and physiologic splitting of the second heart sound was noted. There were no murmurs, rubs, clicks, or gallops.  Lungs:Lungs were clear to auscultation and percussion, and with normal diaphragmatic excursion. No wheezes or rales were noted.   Neuro: Neurologically, the patient was awake, alert, and oriented to person, place and time. There were no obvious focal neurologic abnormalities.  Extremities: No clubbing cyanosis or edema.      Lab Results   Component Value Date    LDLCALC 108.0 04/02/2024     BMP  Lab Results   Component Value Date     04/02/2024    K 4.0 04/02/2024     04/02/2024    CO2 24 04/02/2024    BUN 17 04/02/2024    CREATININE 0.9 04/02/2024    CALCIUM 9.8 04/02/2024    ANIONGAP 12 04/02/2024    ESTGFRAFRICA >60.0 05/02/2018    EGFRNONAA >60.0 05/02/2018       Lab Results   Component Value Date    URICACID 4.1 11/18/2022     Assessment/plan:     1. Essential hypertension  Overview:  Two gram sodium diet.    Weight loss  discussed.    Try to walk 2 miles per day.    Quit smoking.    Current medications will be:  Losartan HCTZ 100/25 mg daily      2. Attention deficit hyperactivity disorder (ADHD), predominantly inattentive type  -     dextroamphetamine-amphetamine (ADDERALL) 30 mg Tab; Take1 tablet by mouth in the morning and 1 tablet at noon  Dispense: 60 tablet; Refill: 0  -     dextroamphetamine-amphetamine (ADDERALL) 30 mg Tab; TAKE ONE TABLET BY MOUTH IN THE MORNING AND ONE TABLET AT NOON  Dispense: 60 tablet; Refill: 0  -     dextroamphetamine-amphetamine (ADDERALL) 30 mg Tab; TAKE ONE TABLET BY MOUTH IN THE MORNING AND ONE TABLET AT NOON  Dispense: 60 tablet; Refill: 0    3. GIOVANA (generalized anxiety disorder)  -     ALPRAZolam (XANAX) 0.5 MG tablet; Take 1 tablet (0.5 mg total) by mouth 2 (two) times daily as needed for Anxiety.  Dispense: 60 tablet; Refill: 2    4. Chronic gout without tophus, unspecified cause, unspecified site      Attention deficit hyperactivity disorder - stable on current medication  Continue Adderall  15 minute discussion with the patient regarding the importance of proper sleep hygiene was completed. The patientwill be encouraged to go to bed at the same time and wake up at the same time even on the weekends. The patient was encouraged to continue giving the medication even on weekends and holidays.    Cervical spondylolysis.    Continue mobic and gabapentin.     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    -     colchicine (COLCRYS) 0.6 mg tablet; Take 1 tablet (0.6 mg total) by mouth once daily.      The next appointment will be every 3 months.

## 2024-09-25 ENCOUNTER — OFFICE VISIT (OUTPATIENT)
Dept: INTERNAL MEDICINE | Facility: CLINIC | Age: 59
End: 2024-09-25

## 2024-09-25 VITALS
RESPIRATION RATE: 16 BRPM | HEIGHT: 60 IN | DIASTOLIC BLOOD PRESSURE: 85 MMHG | BODY MASS INDEX: 33.19 KG/M2 | HEART RATE: 70 BPM | WEIGHT: 169.06 LBS | SYSTOLIC BLOOD PRESSURE: 135 MMHG | OXYGEN SATURATION: 96 %

## 2024-09-25 DIAGNOSIS — I10 ESSENTIAL HYPERTENSION: ICD-10-CM

## 2024-09-25 DIAGNOSIS — M1A.0710 IDIOPATHIC CHRONIC GOUT OF RIGHT FOOT WITHOUT TOPHUS: ICD-10-CM

## 2024-09-25 DIAGNOSIS — K21.00 GASTROESOPHAGEAL REFLUX DISEASE WITH ESOPHAGITIS WITHOUT HEMORRHAGE: ICD-10-CM

## 2024-09-25 DIAGNOSIS — F41.1 GAD (GENERALIZED ANXIETY DISORDER): ICD-10-CM

## 2024-09-25 DIAGNOSIS — R25.2 MUSCLE CRAMPS: ICD-10-CM

## 2024-09-25 DIAGNOSIS — G25.81 RESTLESS LEGS SYNDROME: ICD-10-CM

## 2024-09-25 DIAGNOSIS — F90.0 ATTENTION DEFICIT HYPERACTIVITY DISORDER (ADHD), PREDOMINANTLY INATTENTIVE TYPE: ICD-10-CM

## 2024-09-25 DIAGNOSIS — M48.062 SPINAL STENOSIS OF LUMBAR REGION WITH NEUROGENIC CLAUDICATION: ICD-10-CM

## 2024-09-25 PROCEDURE — 99214 OFFICE O/P EST MOD 30 MIN: CPT | Mod: PBBFAC,PN | Performed by: FAMILY MEDICINE

## 2024-09-25 PROCEDURE — 99999 PR PBB SHADOW E&M-EST. PATIENT-LVL IV: CPT | Mod: PBBFAC,,, | Performed by: FAMILY MEDICINE

## 2024-09-25 PROCEDURE — 99213 OFFICE O/P EST LOW 20 MIN: CPT | Mod: S$PBB,,, | Performed by: FAMILY MEDICINE

## 2024-09-25 RX ORDER — ALLOPURINOL 300 MG/1
300 TABLET ORAL DAILY
Qty: 30 TABLET | Refills: 5 | Status: SHIPPED | OUTPATIENT
Start: 2024-09-25

## 2024-09-25 RX ORDER — ALPRAZOLAM 0.5 MG/1
0.5 TABLET ORAL 2 TIMES DAILY PRN
Qty: 60 TABLET | Refills: 2 | Status: SHIPPED | OUTPATIENT
Start: 2024-09-25

## 2024-09-25 RX ORDER — DEXTROAMPHETAMINE SACCHARATE, AMPHETAMINE ASPARTATE, DEXTROAMPHETAMINE SULFATE AND AMPHETAMINE SULFATE 7.5; 7.5; 7.5; 7.5 MG/1; MG/1; MG/1; MG/1
TABLET ORAL
Qty: 60 TABLET | Refills: 0 | Status: SHIPPED | OUTPATIENT
Start: 2024-09-27

## 2024-09-25 RX ORDER — POTASSIUM CHLORIDE 20 MEQ/1
20 TABLET, EXTENDED RELEASE ORAL DAILY
Qty: 30 TABLET | Refills: 5 | Status: SHIPPED | OUTPATIENT
Start: 2024-09-25

## 2024-09-25 RX ORDER — GABAPENTIN 300 MG/1
300 CAPSULE ORAL 2 TIMES DAILY
Qty: 60 CAPSULE | Refills: 5 | Status: SHIPPED | OUTPATIENT
Start: 2024-09-25

## 2024-09-25 RX ORDER — PANTOPRAZOLE SODIUM 40 MG/1
TABLET, DELAYED RELEASE ORAL
Qty: 30 TABLET | Refills: 5 | Status: SHIPPED | OUTPATIENT
Start: 2024-09-25

## 2024-09-25 RX ORDER — DEXTROAMPHETAMINE SACCHARATE, AMPHETAMINE ASPARTATE, DEXTROAMPHETAMINE SULFATE AND AMPHETAMINE SULFATE 7.5; 7.5; 7.5; 7.5 MG/1; MG/1; MG/1; MG/1
30 TABLET ORAL
Qty: 60 TABLET | Refills: 0 | Status: SHIPPED | OUTPATIENT
Start: 2024-11-25

## 2024-09-25 RX ORDER — MELOXICAM 15 MG/1
15 TABLET ORAL DAILY
Qty: 30 TABLET | Refills: 2 | Status: SHIPPED | OUTPATIENT
Start: 2024-09-25

## 2024-09-25 RX ORDER — LOSARTAN POTASSIUM AND HYDROCHLOROTHIAZIDE 25; 100 MG/1; MG/1
1 TABLET ORAL DAILY
Qty: 30 TABLET | Refills: 5 | Status: SHIPPED | OUTPATIENT
Start: 2024-09-25 | End: 2025-09-25

## 2024-09-25 RX ORDER — PRAMIPEXOLE DIHYDROCHLORIDE 1 MG/1
1 TABLET ORAL NIGHTLY
Qty: 30 TABLET | Refills: 5 | Status: SHIPPED | OUTPATIENT
Start: 2024-09-25

## 2024-09-25 RX ORDER — DEXTROAMPHETAMINE SACCHARATE, AMPHETAMINE ASPARTATE, DEXTROAMPHETAMINE SULFATE AND AMPHETAMINE SULFATE 7.5; 7.5; 7.5; 7.5 MG/1; MG/1; MG/1; MG/1
TABLET ORAL
Qty: 60 TABLET | Refills: 0 | Status: SHIPPED | OUTPATIENT
Start: 2024-10-25

## 2024-09-25 NOTE — PROGRESS NOTES
Pt is a 59 y.o. female who presents for 3 month check up.    History of present illness:   Sylvain Robison is a 59 y.o. female here for a checkup.  She lost her health insurance.  She is now getting most of her care at [facility].  She has a history of hypertension She takes hydrochlorothiazide 25 mg every morning.  She tolerates it well.  Her blood pressure is better.  She gets a lot of muscle cramps.  Patient has a history of gout in her right foot.  She's taking allopurinol and has not had an acute attack. Uric acid WNL.   She is doing very well on Adderall.  She is not having trouble sleeping. She is Handling stress at home very well.  She is not having side effects from the medication such as palpitations, anxiety attacks.  She is able to focus and stay on track and get projects finished.  Her  is being treated for lymphoma recurrence and is scheduled for bone marrow transplant.  He is doing better at home stress has improved.  Xanax for anxiety and stress.   Neck pain and radiating numbness into right hand controlled with Gabapentin. X-ray was reviewed.  She has facet joint hypertrophy and loss of disc space at C5-6.  LBP w/ sciatica pain in left leg controlled with Gabapentin twice daily and tylenol arthritis + motrin daily.   Not on  celebrex. Back pain better with forward flexion, leg and hip pain worse at night and with climbing stairs. MRI shows lumbar facet arthropathy, mild lumbar spinal stenosis. Tramadol usually helps but not lately.  She received 3 epidural sterile injections years ago, which helped a little.    She reports increased acid reflux despite taking Protonix 40 mg daily and cassandra-seltzer as needed.     Review of systems:  Gen.: No weight loss  HEENT: No headaches, sinus congestion, change in vision.  Sore throat in the morning  Cardiovascular: No chest pain, palpitations  Respiratory: No cough, shortness of breath  Neurological: Sleeping well, no weakness, no syncope, normal memory, no  seizure, no ticks  MSK:  Leg cramps    Physical exam:   Vitals:    09/25/24 1543   BP: 135/85   Pulse: 70   Resp:        Gen.: Well developed, well nourished white female in no apparent distress  HEENT: Pupils equal round reactive to light and accommodation, extraocular muscles intact, TMs are normal translucent mobile, neck is supple no lymphadenopathy no JVD, throat is clear.  Neck exam: Good range of motion.  Mild Spurling sign.  Cracking palpable.  Upper extremity strength equal and strong.  Upper extremity reflexes 2+ bilaterally   Heart:Cardiac exam revealed the PMI to be normally situated and sized. The rhythm was regular and no extrasystoles were noted during several minutes of auscultation. The first and second heart sounds were normal and physiologic splitting of the second heart sound was noted. There were no murmurs, rubs, clicks, or gallops.  Lungs:Lungs were clear to auscultation and percussion, and with normal diaphragmatic excursion. No wheezes or rales were noted.   Neuro: Neurologically, the patient was awake, alert, and oriented to person, place and time. There were no obvious focal neurologic abnormalities.  Extremities: No clubbing cyanosis or edema.      Lab Results   Component Value Date    LDLCALC 108.0 04/02/2024     BMP  Lab Results   Component Value Date     09/25/2024    K 3.9 09/25/2024     09/25/2024    CO2 23 09/25/2024    BUN 17 09/25/2024    CREATININE 1.1 09/25/2024    CALCIUM 9.9 09/25/2024    ANIONGAP 12 09/25/2024    ESTGFRAFRICA >60.0 05/02/2018    EGFRNONAA >60.0 05/02/2018       Lab Results   Component Value Date    URICACID 3.6 09/25/2024     Assessment/plan:     1. Attention deficit hyperactivity disorder (ADHD), predominantly inattentive type  Overview:  I will continue to monitor patient's symptoms and readjust medications according to the patient's level of function.    15 minute discussion with the patient regarding the importance of proper sleep hygiene was  completed.  The patient will be encouraged to go to bed at the same time and wake up at the same time even on the weekends.  The patient was encouraged to continue taking the medication even on weekends and holidays.     Orders:  -     dextroamphetamine-amphetamine (ADDERALL) 30 mg Tab; TAKE ONE TABLET BY MOUTH IN THE MORNING AND ONE TABLET AT NOON  Dispense: 60 tablet; Refill: 0  -     dextroamphetamine-amphetamine (ADDERALL) 30 mg Tab; TAKE ONE TABLET BY MOUTH IN THE MORNING AND ONE TABLET AT NOON  Dispense: 60 tablet; Refill: 0  -     dextroamphetamine-amphetamine (ADDERALL) 30 mg Tab; Take1 tablet by mouth in the morning and 1 tablet at noon  Dispense: 60 tablet; Refill: 0    2. GIOVANA (generalized anxiety disorder)  Overview:  Patient stable on Xanax 0.5 mg twice daily.  She has been on this medication for several decades.  It would be hard to wean her off    Orders:  -     ALPRAZolam (XANAX) 0.5 MG tablet; Take 1 tablet (0.5 mg total) by mouth 2 (two) times daily as needed for Anxiety.  Dispense: 60 tablet; Refill: 2    3. Gastroesophageal reflux disease with esophagitis without hemorrhage  Overview:  Continue Protonix 40 mg daily    Orders:  -     pantoprazole (PROTONIX) 40 MG tablet; TAKE 1 TABLET BY MOUTH ONCE DAILY  Dispense: 30 tablet; Refill: 5    4. Idiopathic chronic gout of right foot without tophus  Overview:  Continue allopurinol 300 mg daily   Keep uric acid levels less than 6.0.    Orders:  -     allopurinoL (ZYLOPRIM) 300 MG tablet; Take 1 tablet (300 mg total) by mouth once daily.  Dispense: 30 tablet; Refill: 5  -     Uric Acid; Future; Expected date: 09/25/2024  -     Comprehensive Metabolic Panel; Future; Expected date: 09/25/2024    5. Restless legs syndrome  -     pramipexole (MIRAPEX) 1 MG tablet; Take 1 tablet (1 mg total) by mouth every evening.  Dispense: 30 tablet; Refill: 5    6. Spinal stenosis of lumbar region with neurogenic claudication  -     gabapentin (NEURONTIN) 300 MG capsule;  Take 1 capsule (300 mg total) by mouth 2 (two) times daily.  Dispense: 60 capsule; Refill: 5  -     meloxicam (MOBIC) 15 MG tablet; Take 1 tablet (15 mg total) by mouth once daily.  Dispense: 30 tablet; Refill: 2    7. Muscle cramps  -     potassium chloride SA (K-DUR,KLOR-CON) 20 MEQ tablet; Take 1 tablet (20 mEq total) by mouth once daily.  Dispense: 30 tablet; Refill: 5    8. Essential hypertension  Overview:  Two gram sodium diet.    Weight loss discussed.    Try to walk 2 miles per day.    Quit smoking.    Current medications will be:  Losartan HCTZ 100/25 mg daily    Orders:  -     losartan-hydrochlorothiazide 100-25 mg (HYZAAR) 100-25 mg per tablet; Take 1 tablet by mouth once daily.  Dispense: 30 tablet; Refill: 5    Attention deficit hyperactivity disorder - stable on current medication  Continue Adderall  15 minute discussion with the patient regarding the importance of proper sleep hygiene was completed. The patientwill be encouraged to go to bed at the same time and wake up at the same time even on the weekends. The patient was encouraged to continue giving the medication even on weekends and holidays.    Cervical spondylolysis.    Continue mobic and gabapentin.     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    -     colchicine (COLCRYS) 0.6 mg tablet; Take 1 tablet (0.6 mg total) by mouth once daily.      The next appointment will be every 3 months.

## 2024-12-04 DIAGNOSIS — Z12.31 OTHER SCREENING MAMMOGRAM: ICD-10-CM

## 2024-12-10 ENCOUNTER — TELEPHONE (OUTPATIENT)
Dept: INTERNAL MEDICINE | Facility: CLINIC | Age: 59
End: 2024-12-10

## 2024-12-10 NOTE — TELEPHONE ENCOUNTER
----- Message from Keven sent at 2024  2:12 PM CST -----  Contact: pt  Sylvain Robison  MRN: [id number]  : 1965  PCP: Diego Day  Home Phone      [phone]  Work Phone      Not on file.  Mobile          [phone]      MESSAGE:     Pt called to schedule appt for surgery clearance. [facility] didn't have anything until . Pt would need to be seen sooner. Fax # [phone] [place].        Please advise   [phone]

## 2024-12-27 ENCOUNTER — OFFICE VISIT (OUTPATIENT)
Dept: INTERNAL MEDICINE | Facility: CLINIC | Age: 59
End: 2024-12-27

## 2024-12-27 VITALS
WEIGHT: 167.56 LBS | HEART RATE: 88 BPM | OXYGEN SATURATION: 95 % | SYSTOLIC BLOOD PRESSURE: 138 MMHG | BODY MASS INDEX: 32.89 KG/M2 | DIASTOLIC BLOOD PRESSURE: 70 MMHG | HEIGHT: 60 IN | RESPIRATION RATE: 16 BRPM

## 2024-12-27 DIAGNOSIS — S46.012D TRAUMATIC COMPLETE TEAR OF LEFT ROTATOR CUFF, SUBSEQUENT ENCOUNTER: Primary | ICD-10-CM

## 2024-12-27 DIAGNOSIS — Z01.818 PREOPERATIVE EVALUATION TO RULE OUT SURGICAL CONTRAINDICATION: ICD-10-CM

## 2024-12-27 DIAGNOSIS — F41.1 GAD (GENERALIZED ANXIETY DISORDER): ICD-10-CM

## 2024-12-27 DIAGNOSIS — I10 ESSENTIAL HYPERTENSION: ICD-10-CM

## 2024-12-27 DIAGNOSIS — F90.0 ATTENTION DEFICIT HYPERACTIVITY DISORDER (ADHD), PREDOMINANTLY INATTENTIVE TYPE: ICD-10-CM

## 2024-12-27 DIAGNOSIS — M1A.9XX0 CHRONIC GOUT WITHOUT TOPHUS, UNSPECIFIED CAUSE, UNSPECIFIED SITE: ICD-10-CM

## 2024-12-27 DIAGNOSIS — K21.00 GASTROESOPHAGEAL REFLUX DISEASE WITH ESOPHAGITIS WITHOUT HEMORRHAGE: ICD-10-CM

## 2024-12-27 PROBLEM — S46.012A TRAUMATIC COMPLETE TEAR OF LEFT ROTATOR CUFF: Status: ACTIVE | Noted: 2024-12-27

## 2024-12-27 PROCEDURE — 99999 PR PBB SHADOW E&M-EST. PATIENT-LVL IV: CPT | Mod: PBBFAC,,, | Performed by: FAMILY MEDICINE

## 2024-12-27 PROCEDURE — 99214 OFFICE O/P EST MOD 30 MIN: CPT | Mod: PBBFAC | Performed by: FAMILY MEDICINE

## 2024-12-27 RX ORDER — DEXTROAMPHETAMINE SACCHARATE, AMPHETAMINE ASPARTATE, DEXTROAMPHETAMINE SULFATE AND AMPHETAMINE SULFATE 7.5; 7.5; 7.5; 7.5 MG/1; MG/1; MG/1; MG/1
TABLET ORAL
Qty: 60 TABLET | Refills: 0 | Status: SHIPPED | OUTPATIENT
Start: 2025-01-24

## 2024-12-27 RX ORDER — DEXTROAMPHETAMINE SACCHARATE, AMPHETAMINE ASPARTATE, DEXTROAMPHETAMINE SULFATE AND AMPHETAMINE SULFATE 7.5; 7.5; 7.5; 7.5 MG/1; MG/1; MG/1; MG/1
30 TABLET ORAL
Qty: 60 TABLET | Refills: 0 | Status: SHIPPED | OUTPATIENT
Start: 2025-02-24

## 2024-12-27 RX ORDER — DEXTROAMPHETAMINE SACCHARATE, AMPHETAMINE ASPARTATE, DEXTROAMPHETAMINE SULFATE AND AMPHETAMINE SULFATE 7.5; 7.5; 7.5; 7.5 MG/1; MG/1; MG/1; MG/1
TABLET ORAL
Qty: 60 TABLET | Refills: 0 | Status: SHIPPED | OUTPATIENT
Start: 2024-12-27

## 2024-12-27 NOTE — PROGRESS NOTES
History of Present Illness    CHIEF COMPLAINT:  Patient presents today for follow-up regarding rotator cuff surgery.    MUSCULOSKELETAL:  She reports walking more crooked related to her scoliosis.    GERD/HIATAL HERNIA:  She experiences burning sensation approximately 30 minutes after eating and occasional choking episodes. She manages symptoms through dietary modifications.    SOCIAL HISTORY:  She continues to smoke.    CURRENT MEDICATIONS:  She is currently taking Losartan for blood pressure, Protonix for GERD, Allopurinol for gout, Adderall, Gabapentin, and Meloxicam. She requests medication refills.      ROS:  General: -fever, -chills, -fatigue, -weight gain, -weight loss  Eyes: -vision changes, -redness, -discharge  ENT: -ear pain, -nasal congestion, -sore throat, +difficulty swallowing  Cardiovascular: -chest pain, -palpitations, -lower extremity edema  Respiratory: -cough, -shortness of breath  Gastrointestinal: -abdominal pain, -nausea, -vomiting, -diarrhea, -constipation, -blood in stool  Genitourinary: -dysuria, -hematuria, -frequency  Musculoskeletal: +joint pain, +muscle pain. +scoliosis  Skin: -rash, -lesion  Neurological: -headache, -dizziness, -numbness, -tingling  Psychiatric: -anxiety, -depression, -sleep difficulty       Physical Exam    Vitals:    12/27/24 1020   BP: 138/70   Pulse: 88   Resp: 16       General: No acute distress. Well-developed. Well-nourished.  Eyes: EOMI. Sclerae anicteric.  HENT: Normocephalic. Atraumatic. Nares patent. Moist oral mucosa.  Ears: Bilateral TMs clear. Bilateral EACs clear.  Cardiovascular: Regular rate. Regular rhythm. No murmurs. No rubs. No gallops. Normal S1, S2.  Respiratory: Normal respiratory effort. Clear to auscultation bilaterally. No rales. No rhonchi. No wheezing.  Abdomen: Soft. Non-tender. Non-distended. Normoactive bowel sounds.  Musculoskeletal: Scoliosis, Left shoulder-She has pain with terminal motion. 4/5 strength supraspinatus, infraspinatus.  5/5 subscap. Positive Jo, negative belly press. Positive Beck, positive Neer. Positive modified Hendry. 5/5 biceps.   Extremities: No lower extremity edema.  Neurological: Alert & oriented x3. No slurred speech. Normal gait.  Psychiatric: Normal mood. Normal affect. Good insight. Good judgment.  Skin: Warm. Dry. No rash.       Assessment & Plan    IMPRESSION:  - Evaluated patient for rotator cuff injury, considering surgery as treatment option  - Assessed need for pre-operative clearance, I will ask surgeon if EKG and lab work needs to be done  - Reviewed ongoing occupational therapy for muscle strengthening  - Considered impact of patient's GERD and hiatal hernia on potential anesthesia for surgery  - Noted patient's scoliosis progression, deemed surgical intervention not appropriate due to age  - Assessed smoking status and potential impact on surgical healing    ROTATOR CUFF TEAR (LEFT SHOULDER):  - Discussed potential post-surgical recovery timeline: arm immobilization for 6 weeks, 3-5 months for full recovery.  - Patient to continue occupational therapy for arm strengthening.  - Recommend using step stool for reaching high shelves to avoid strain.    GASTROESOPHAGEAL REFLUX DISEASE (GERD) AND HIATAL HERNIA:  - Explained hiatal hernia: part of stomach above diaphragm, facilitating reflux.  - Continued Protonix for GERD.    GOUT:  - Continued Allopurinol for gout management.    HYPERTENSION:  - Continued Losartan for blood pressure management.    MEDICATIONS for ADHD, anxiety refilled.  - Started Adderall.  - Continued Xanax.  - Continued Pentoxifylline.  - Continued Meloxicam.    FOLLOW-UP:  - Follow up in 3 months.  - Canceled previously scheduled appointment on January 8th.       1. Traumatic complete tear of left rotator cuff, subsequent encounter  Comments:  Medically cleared for surgery  Overview:  Medically cleared for surgery      2. Preoperative evaluation to rule out surgical  contraindication    3. Attention deficit hyperactivity disorder (ADHD), predominantly inattentive type  Overview:  I will continue to monitor patient's symptoms and readjust medications according to the patient's level of function.    15 minute discussion with the patient regarding the importance of proper sleep hygiene was completed.  The patient will be encouraged to go to bed at the same time and wake up at the same time even on the weekends.  The patient was encouraged to continue taking the medication even on weekends and holidays.     Orders:  -     dextroamphetamine-amphetamine (ADDERALL) 30 mg Tab; TAKE ONE TABLET BY MOUTH IN THE MORNING AND ONE TABLET AT NOON  Dispense: 60 tablet; Refill: 0  -     dextroamphetamine-amphetamine (ADDERALL) 30 mg Tab; TAKE ONE TABLET BY MOUTH IN THE MORNING AND ONE TABLET AT NOON  Dispense: 60 tablet; Refill: 0  -     dextroamphetamine-amphetamine (ADDERALL) 30 mg Tab; Take1 tablet by mouth in the morning and 1 tablet at noon  Dispense: 60 tablet; Refill: 0    4. GIOVANA (generalized anxiety disorder)  Overview:  Patient stable on Xanax 0.5 mg twice daily.  She has been on this medication for several decades.  It would be hard to wean her off      5. Chronic gout without tophus, unspecified cause, unspecified site  Overview:  Continue allopurinol 300 mg daily   Keep uric acid levels less than 6.0.      6. Essential hypertension  Overview:  Two gram sodium diet.    Weight loss discussed.    Try to walk 2 miles per day.    Quit smoking.    Current medications will be:  Losartan HCTZ 100/25 mg daily      7. Gastroesophageal reflux disease with esophagitis without hemorrhage  Overview:  Continue Protonix 40 mg daily         RETURN TO CLINIC IN 3 MONTHS

## 2025-02-19 ENCOUNTER — OFFICE VISIT (OUTPATIENT)
Dept: INTERNAL MEDICINE | Facility: CLINIC | Age: 60
End: 2025-02-19

## 2025-02-19 VITALS
HEART RATE: 80 BPM | DIASTOLIC BLOOD PRESSURE: 80 MMHG | HEIGHT: 60 IN | SYSTOLIC BLOOD PRESSURE: 130 MMHG | WEIGHT: 164.25 LBS | BODY MASS INDEX: 32.25 KG/M2 | RESPIRATION RATE: 18 BRPM | OXYGEN SATURATION: 100 %

## 2025-02-19 DIAGNOSIS — K21.00 GASTROESOPHAGEAL REFLUX DISEASE WITH ESOPHAGITIS WITHOUT HEMORRHAGE: ICD-10-CM

## 2025-02-19 DIAGNOSIS — M48.062 SPINAL STENOSIS OF LUMBAR REGION WITH NEUROGENIC CLAUDICATION: ICD-10-CM

## 2025-02-19 DIAGNOSIS — F90.0 ATTENTION DEFICIT HYPERACTIVITY DISORDER (ADHD), PREDOMINANTLY INATTENTIVE TYPE: ICD-10-CM

## 2025-02-19 DIAGNOSIS — Z01.818 PREOPERATIVE EVALUATION TO RULE OUT SURGICAL CONTRAINDICATION: ICD-10-CM

## 2025-02-19 DIAGNOSIS — S46.012D TRAUMATIC COMPLETE TEAR OF LEFT ROTATOR CUFF, SUBSEQUENT ENCOUNTER: Primary | ICD-10-CM

## 2025-02-19 DIAGNOSIS — I10 ESSENTIAL HYPERTENSION: ICD-10-CM

## 2025-02-19 DIAGNOSIS — M1A.0710 IDIOPATHIC CHRONIC GOUT OF RIGHT FOOT WITHOUT TOPHUS: ICD-10-CM

## 2025-02-19 DIAGNOSIS — R25.2 MUSCLE CRAMPS: ICD-10-CM

## 2025-02-19 DIAGNOSIS — G25.81 RESTLESS LEGS SYNDROME: ICD-10-CM

## 2025-02-19 DIAGNOSIS — F41.1 GAD (GENERALIZED ANXIETY DISORDER): ICD-10-CM

## 2025-02-19 PROCEDURE — 99214 OFFICE O/P EST MOD 30 MIN: CPT | Mod: PBBFAC,PN | Performed by: FAMILY MEDICINE

## 2025-02-19 RX ORDER — PRAMIPEXOLE DIHYDROCHLORIDE 1 MG/1
1 TABLET ORAL NIGHTLY
Qty: 30 TABLET | Refills: 5 | Status: SHIPPED | OUTPATIENT
Start: 2025-02-19

## 2025-02-19 RX ORDER — ALPRAZOLAM 0.5 MG/1
0.5 TABLET ORAL 2 TIMES DAILY PRN
Qty: 60 TABLET | Refills: 2 | Status: SHIPPED | OUTPATIENT
Start: 2025-02-19

## 2025-02-19 RX ORDER — DEXTROAMPHETAMINE SACCHARATE, AMPHETAMINE ASPARTATE, DEXTROAMPHETAMINE SULFATE AND AMPHETAMINE SULFATE 7.5; 7.5; 7.5; 7.5 MG/1; MG/1; MG/1; MG/1
30 TABLET ORAL
Qty: 60 TABLET | Refills: 0 | Status: SHIPPED | OUTPATIENT
Start: 2025-04-22

## 2025-02-19 RX ORDER — GABAPENTIN 300 MG/1
300 CAPSULE ORAL 2 TIMES DAILY
Qty: 60 CAPSULE | Refills: 5 | Status: SHIPPED | OUTPATIENT
Start: 2025-02-19

## 2025-02-19 RX ORDER — ALLOPURINOL 300 MG/1
300 TABLET ORAL DAILY
Qty: 30 TABLET | Refills: 5 | Status: SHIPPED | OUTPATIENT
Start: 2025-02-19

## 2025-02-19 RX ORDER — POTASSIUM CHLORIDE 20 MEQ/1
20 TABLET, EXTENDED RELEASE ORAL DAILY
Qty: 30 TABLET | Refills: 5 | Status: SHIPPED | OUTPATIENT
Start: 2025-02-19

## 2025-02-19 RX ORDER — LOSARTAN POTASSIUM AND HYDROCHLOROTHIAZIDE 25; 100 MG/1; MG/1
1 TABLET ORAL DAILY
Qty: 30 TABLET | Refills: 5 | Status: SHIPPED | OUTPATIENT
Start: 2025-02-19 | End: 2026-02-19

## 2025-02-19 RX ORDER — PANTOPRAZOLE SODIUM 40 MG/1
TABLET, DELAYED RELEASE ORAL
Qty: 30 TABLET | Refills: 5 | Status: SHIPPED | OUTPATIENT
Start: 2025-02-19

## 2025-02-19 RX ORDER — DEXTROAMPHETAMINE SACCHARATE, AMPHETAMINE ASPARTATE, DEXTROAMPHETAMINE SULFATE AND AMPHETAMINE SULFATE 7.5; 7.5; 7.5; 7.5 MG/1; MG/1; MG/1; MG/1
TABLET ORAL
Qty: 60 TABLET | Refills: 0 | Status: SHIPPED | OUTPATIENT
Start: 2025-03-21

## 2025-02-19 NOTE — PROGRESS NOTES
Pt is a 59 y.o. female who presents for 3 month check up.    History of present illness:   Sylvain Robison is a 59 y.o. female here for a checkup and surgical clearance for shoulder surgery  She has a history of hypertension She takes hydrochlorothiazide 25 mg every morning.  She tolerates it well.  Her blood pressure is better.  She gets a lot of muscle cramps.  Patient has a history of gout in her right foot.  She's taking allopurinol and has not had an acute attack. Uric acid WNL.   She is doing very well on Adderall.  She is not having trouble sleeping. She is Handling stress at home very well.  She is not having side effects from the medication such as palpitations, anxiety attacks.  She is able to focus and stay on track and get projects finished.  Her  is being treated for lymphoma recurrence and is scheduled for bone marrow transplant.  He is doing better at home stress has improved.  Xanax for anxiety and stress.   Neck pain and radiating numbness into right hand controlled with Gabapentin. X-ray was reviewed.  She has facet joint hypertrophy and loss of disc space at C5-6.  LBP w/ sciatica pain in left leg controlled with Gabapentin twice daily and tylenol arthritis + motrin daily.   Not on  celebrex. Back pain better with forward flexion, leg and hip pain worse at night and with climbing stairs. MRI shows lumbar facet arthropathy, mild lumbar spinal stenosis. Tramadol usually helps but not lately.  She received 3 epidural sterile injections years ago, which helped a little.    She reports increased acid reflux despite taking Protonix 40 mg daily and cassandra-seltzer as needed.     Review of systems:  Gen.: No weight loss  HEENT: No headaches, sinus congestion, change in vision.  Sore throat in the morning  Cardiovascular: No chest pain, palpitations  Respiratory: No cough, shortness of breath  Neurological: Sleeping well, no weakness, no syncope, normal memory, no seizure, no ticks  MSK:  Leg  cramps    Physical exam:   Vitals:    02/19/25 1109   BP: 130/80   Pulse: 80   Resp:        Gen.: Well developed, well nourished white female in no apparent distress  HEENT: Pupils equal round reactive to light and accommodation, extraocular muscles intact, TMs are normal translucent mobile, neck is supple no lymphadenopathy no JVD, throat is clear.  Neck exam: Good range of motion.  Mild Spurling sign.  Cracking palpable.  Upper extremity strength equal and strong.  Upper extremity reflexes 2+ bilaterally   Heart:Cardiac exam revealed the PMI to be normally situated and sized. The rhythm was regular and no extrasystoles were noted during several minutes of auscultation. The first and second heart sounds were normal and physiologic splitting of the second heart sound was noted. There were no murmurs, rubs, clicks, or gallops.  Lungs:Lungs were clear to auscultation and percussion, and with normal diaphragmatic excursion. No wheezes or rales were noted.   Neuro: Neurologically, the patient was awake, alert, and oriented to person, place and time. There were no obvious focal neurologic abnormalities.  Extremities: No clubbing cyanosis or edema.      Lab Results   Component Value Date    LDLCALC 108.0 04/02/2024     BMP  Lab Results   Component Value Date     09/25/2024    K 3.9 09/25/2024     09/25/2024    CO2 23 09/25/2024    BUN 17 09/25/2024    CREATININE 1.1 09/25/2024    CALCIUM 9.9 09/25/2024    ANIONGAP 12 09/25/2024    ESTGFRAFRICA >60.0 05/02/2018    EGFRNONAA >60.0 05/02/2018       Lab Results   Component Value Date    URICACID 3.6 09/25/2024     Assessment/plan:     1. Traumatic complete tear of left rotator cuff, subsequent encounter  Overview:  Medically cleared for surgery      2. Attention deficit hyperactivity disorder (ADHD), predominantly inattentive type  Overview:  I will continue to monitor patient's symptoms and readjust medications according to the patient's level of function.    15  minute discussion with the patient regarding the importance of proper sleep hygiene was completed.  The patient will be encouraged to go to bed at the same time and wake up at the same time even on the weekends.  The patient was encouraged to continue taking the medication even on weekends and holidays.     Orders:  -     dextroamphetamine-amphetamine (ADDERALL) 30 mg Tab; Take1 tablet by mouth in the morning and 1 tablet at noon  Dispense: 60 tablet; Refill: 0  -     dextroamphetamine-amphetamine (ADDERALL) 30 mg Tab; TAKE ONE TABLET BY MOUTH IN THE MORNING AND ONE TABLET AT NOON  Dispense: 60 tablet; Refill: 0    3. Essential hypertension  Overview:  Two gram sodium diet.    Weight loss discussed.    Try to walk 2 miles per day.    Quit smoking.    Current medications will be:  Losartan HCTZ 100/25 mg daily    Orders:  -     losartan-hydrochlorothiazide 100-25 mg (HYZAAR) 100-25 mg per tablet; Take 1 tablet by mouth once daily.  Dispense: 30 tablet; Refill: 5    4. GIOVANA (generalized anxiety disorder)  Overview:  Patient stable on Xanax 0.5 mg twice daily.  She has been on this medication for several decades.  It would be hard to wean her off    Orders:  -     ALPRAZolam (XANAX) 0.5 MG tablet; Take 1 tablet (0.5 mg total) by mouth 2 (two) times daily as needed for Anxiety.  Dispense: 60 tablet; Refill: 2    5. Gastroesophageal reflux disease with esophagitis without hemorrhage  Overview:  Continue Protonix 40 mg daily    Orders:  -     pantoprazole (PROTONIX) 40 MG tablet; TAKE 1 TABLET BY MOUTH ONCE DAILY  Dispense: 30 tablet; Refill: 5    6. Idiopathic chronic gout of right foot without tophus  Overview:  Continue allopurinol 300 mg daily   Keep uric acid levels less than 6.0.    Orders:  -     allopurinoL (ZYLOPRIM) 300 MG tablet; Take 1 tablet (300 mg total) by mouth once daily.  Dispense: 30 tablet; Refill: 5    7. Muscle cramps  -     potassium chloride SA (K-DUR,KLOR-CON) 20 MEQ tablet; Take 1 tablet (20 mEq  total) by mouth once daily.  Dispense: 30 tablet; Refill: 5    8. Restless legs syndrome  -     pramipexole (MIRAPEX) 1 MG tablet; Take 1 tablet (1 mg total) by mouth every evening.  Dispense: 30 tablet; Refill: 5    9. Spinal stenosis of lumbar region with neurogenic claudication  -     gabapentin (NEURONTIN) 300 MG capsule; Take 1 capsule (300 mg total) by mouth 2 (two) times daily.  Dispense: 60 capsule; Refill: 5    10. Preoperative evaluation to rule out surgical contraindication  Comments:  Medically cleared for shoulder surgery    Attention deficit hyperactivity disorder - stable on current medication  Continue Adderall  15 minute discussion with the patient regarding the importance of proper sleep hygiene was completed. The patientwill be encouraged to go to bed at the same time and wake up at the same time even on the weekends. The patient was encouraged to continue giving the medication even on weekends and holidays.    Cervical spondylolysis.    Continue mobic and gabapentin.     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    -     colchicine (COLCRYS) 0.6 mg tablet; Take 1 tablet (0.6 mg total) by mouth once daily.      The next appointment will be every 3 months.

## 2025-03-28 PROBLEM — M75.122 NONTRAUMATIC COMPLETE TEAR OF LEFT ROTATOR CUFF: Status: ACTIVE | Noted: 2025-03-28

## 2025-03-28 PROBLEM — M67.814 BICEPS TENDINOSIS OF LEFT SHOULDER: Status: ACTIVE | Noted: 2025-03-28

## 2025-03-28 PROBLEM — M75.42 SUBACROMIAL IMPINGEMENT OF LEFT SHOULDER: Status: ACTIVE | Noted: 2025-03-28

## 2025-04-11 PROBLEM — S46.012A TRAUMATIC COMPLETE TEAR OF LEFT ROTATOR CUFF: Status: RESOLVED | Noted: 2024-12-27 | Resolved: 2025-04-11

## 2025-04-14 PROBLEM — Z98.890 S/P ROTATOR CUFF REPAIR: Status: ACTIVE | Noted: 2025-04-14

## 2025-05-21 ENCOUNTER — OFFICE VISIT (OUTPATIENT)
Dept: INTERNAL MEDICINE | Facility: CLINIC | Age: 60
End: 2025-05-21

## 2025-05-21 VITALS
RESPIRATION RATE: 18 BRPM | HEART RATE: 90 BPM | OXYGEN SATURATION: 96 % | HEIGHT: 60 IN | BODY MASS INDEX: 31.6 KG/M2 | SYSTOLIC BLOOD PRESSURE: 144 MMHG | WEIGHT: 160.94 LBS | DIASTOLIC BLOOD PRESSURE: 80 MMHG

## 2025-05-21 DIAGNOSIS — M48.062 SPINAL STENOSIS OF LUMBAR REGION WITH NEUROGENIC CLAUDICATION: ICD-10-CM

## 2025-05-21 DIAGNOSIS — G25.81 RESTLESS LEGS SYNDROME: ICD-10-CM

## 2025-05-21 DIAGNOSIS — F41.1 GAD (GENERALIZED ANXIETY DISORDER): ICD-10-CM

## 2025-05-21 DIAGNOSIS — F90.0 ATTENTION DEFICIT HYPERACTIVITY DISORDER (ADHD), PREDOMINANTLY INATTENTIVE TYPE: ICD-10-CM

## 2025-05-21 DIAGNOSIS — R25.2 MUSCLE CRAMPS: ICD-10-CM

## 2025-05-21 DIAGNOSIS — L30.4 INTERTRIGO: Primary | ICD-10-CM

## 2025-05-21 DIAGNOSIS — K21.00 GASTROESOPHAGEAL REFLUX DISEASE WITH ESOPHAGITIS WITHOUT HEMORRHAGE: ICD-10-CM

## 2025-05-21 DIAGNOSIS — M1A.0710 IDIOPATHIC CHRONIC GOUT OF RIGHT FOOT WITHOUT TOPHUS: ICD-10-CM

## 2025-05-21 PROCEDURE — 99999 PR PBB SHADOW E&M-EST. PATIENT-LVL III: CPT | Mod: PBBFAC,,, | Performed by: FAMILY MEDICINE

## 2025-05-21 PROCEDURE — 99213 OFFICE O/P EST LOW 20 MIN: CPT | Mod: PBBFAC | Performed by: FAMILY MEDICINE

## 2025-05-21 RX ORDER — POTASSIUM CHLORIDE 20 MEQ/1
20 TABLET, EXTENDED RELEASE ORAL DAILY
Qty: 30 TABLET | Refills: 5 | Status: SHIPPED | OUTPATIENT
Start: 2025-05-21

## 2025-05-21 RX ORDER — ALLOPURINOL 300 MG/1
300 TABLET ORAL DAILY
Qty: 30 TABLET | Refills: 5 | Status: SHIPPED | OUTPATIENT
Start: 2025-05-21

## 2025-05-21 RX ORDER — DEXTROAMPHETAMINE SACCHARATE, AMPHETAMINE ASPARTATE, DEXTROAMPHETAMINE SULFATE AND AMPHETAMINE SULFATE 7.5; 7.5; 7.5; 7.5 MG/1; MG/1; MG/1; MG/1
30 TABLET ORAL
Qty: 60 TABLET | Refills: 0 | Status: SHIPPED | OUTPATIENT
Start: 2025-07-18

## 2025-05-21 RX ORDER — PROMETHAZINE HYDROCHLORIDE 25 MG/1
25 TABLET ORAL EVERY 6 HOURS PRN
Qty: 10 TABLET | Refills: 0 | Status: SHIPPED | OUTPATIENT
Start: 2025-05-21

## 2025-05-21 RX ORDER — GABAPENTIN 300 MG/1
300 CAPSULE ORAL 2 TIMES DAILY
Qty: 60 CAPSULE | Refills: 5 | Status: SHIPPED | OUTPATIENT
Start: 2025-05-21

## 2025-05-21 RX ORDER — DEXTROAMPHETAMINE SACCHARATE, AMPHETAMINE ASPARTATE, DEXTROAMPHETAMINE SULFATE AND AMPHETAMINE SULFATE 7.5; 7.5; 7.5; 7.5 MG/1; MG/1; MG/1; MG/1
30 TABLET ORAL
Qty: 60 TABLET | Refills: 0 | Status: SHIPPED | OUTPATIENT
Start: 2025-05-21

## 2025-05-21 RX ORDER — PRAMIPEXOLE DIHYDROCHLORIDE 1 MG/1
1 TABLET ORAL NIGHTLY
Qty: 30 TABLET | Refills: 5 | Status: SHIPPED | OUTPATIENT
Start: 2025-05-21

## 2025-05-21 RX ORDER — PANTOPRAZOLE SODIUM 40 MG/1
TABLET, DELAYED RELEASE ORAL
Qty: 30 TABLET | Refills: 5 | Status: SHIPPED | OUTPATIENT
Start: 2025-05-21

## 2025-05-21 RX ORDER — FLUCONAZOLE 100 MG/1
100 TABLET ORAL DAILY
Qty: 7 TABLET | Refills: 0 | Status: SHIPPED | OUTPATIENT
Start: 2025-05-21 | End: 2025-05-28

## 2025-05-21 RX ORDER — ALPRAZOLAM 0.5 MG/1
0.5 TABLET ORAL 2 TIMES DAILY PRN
Qty: 60 TABLET | Refills: 2 | Status: SHIPPED | OUTPATIENT
Start: 2025-05-21

## 2025-05-21 RX ORDER — DEXTROAMPHETAMINE SACCHARATE, AMPHETAMINE ASPARTATE, DEXTROAMPHETAMINE SULFATE AND AMPHETAMINE SULFATE 7.5; 7.5; 7.5; 7.5 MG/1; MG/1; MG/1; MG/1
30 TABLET ORAL
Qty: 60 TABLET | Refills: 0 | Status: SHIPPED | OUTPATIENT
Start: 2025-06-20

## 2025-05-21 NOTE — PROGRESS NOTES
History of Present Illness    CHIEF COMPLAINT:  Patient presents today for follow up after being unable to complete GI procedures.    GERD AND GI PROCEDURES:  She discontinued Protonix for 14 days in preparation for GI procedures, experiencing severe heartburn with all oral intake including water, resulting in weight loss due to poor nutritional intake. Both pH probe placement and colonoscopy were unsuccessful due to inability to maintain left lateral position. She has a history of hernia diagnosed in 2024. She takes Protonix daily with significant symptoms when missing doses and expresses strong preference to continue medical management over surgical evaluation.    MUSCULOSKELETAL:  She reports significant functional limitations due to shoulder and arm issues, unable to perform basic household tasks including washing dishes, lifting pots, or raising arm to shoulder level. She requires assistance with bathing and is currently unable to complete mammogram due to limited mobility. She is performing wrist exercises and limited arm movements as part of recovery.    SKIN:  She reports rash under breasts requiring cloth placement for moisture absorption, and blisters in the intertriginous area under stomach fold due to sweating and poor air circulation.    SOCIAL HISTORY:  She continues tobacco use with smoking noted this morning, though reports reduced frequency compared to previous habits.    CURRENT MEDICATIONS:  She takes Adderall twice daily (morning and afternoon), allopurinol, Xanax, Gabapentin, Protonix, and Mirapex.      ROS:  General: -fever, -chills, -fatigue, -weight gain, -weight loss  Eyes: -vision changes, -redness, -discharge  ENT: -ear pain, -nasal congestion, -sore throat  Cardiovascular: -chest pain, -palpitations, -lower extremity edema  Respiratory: -cough, -shortness of breath  Gastrointestinal: -abdominal pain, -nausea, -vomiting, +diarrhea, -constipation, -blood in stool, +indigestion,  +heartburn  Genitourinary: -dysuria, -hematuria, -frequency  Musculoskeletal: -joint pain, -muscle pain, +pain with movement, +limited movement, +limb pain  Skin: +rash, -lesion  Neurological: -headache, -dizziness, -numbness, -tingling  Psychiatric: -anxiety, -depression, -sleep difficulty       Physical Exam    General: No acute distress. Well-developed. Well-nourished.  Eyes: EOMI. Sclerae anicteric.  HENT: Normocephalic. Atraumatic. Nares patent. Moist oral mucosa.  Ears: Bilateral TMs clear. Bilateral EACs clear.  Cardiovascular: Regular rate. Regular rhythm. No murmurs. No rubs. No gallops. Normal S1, S2.  Respiratory: Normal respiratory effort. Clear to auscultation bilaterally. No rales. No rhonchi. No wheezing.  Abdomen: Soft. Non-tender. Non-distended. Normoactive bowel sounds.  Musculoskeletal: No  obvious deformity. Difficulty bringing arm up to shoulder.  Extremities: No lower extremity edema.  Neurological: Alert & oriented x3. No slurred speech. Normal gait.  Psychiatric: Normal mood. Normal affect. Good insight. Good judgment.  Skin: Warm. Dry. No rash.       Assessment & Plan    K21.9 Gastro-esophageal reflux disease without esophagitis  K46.9 Unspecified abdominal hernia without obstruction or gangrene  M25.519 Pain in unspecified shoulder  M25.619 Stiffness of unspecified shoulder, not elsewhere classified  L30.4 Erythema intertrigo  B37.2 Candidiasis of skin and nail  F17.200 Nicotine dependence, unspecified, uncomplicated    GASTRO-ESOPHAGEAL REFLUX DISEASE (GERD):  - Monitored the patient's heartburn symptoms triggered by various foods and drinks, including water and nutritional drinks, with reports of severe acid reflux symptoms including burning sensation.  - Will continue Protonix indefinitely due to severity of symptoms.  - Discussed potential benefits of reflux surgery, including reduced medication dependence and improved symptom management, but patient prefers to continue medical  management at this time.  - Patient is reluctant to undergo further diagnostic procedures (e.g., pH probe placement) due to recent negative experience and inability to lay on left side.  - Explained that reflux surgery could allow missing occasional Protonix doses without significant discomfort.    ABDOMINAL HERNIA:  - Monitored the patient's known hernia, previously confirmed by X-ray.  - Condition may require repair in the future, though no immediate intervention is necessary.  - Discussed potential hernia repair options, noting the patient's hesitancy to proceed with surgical intervention at this time.    SHOULDER PAIN AND STIFFNESS:  - Monitored the patient's reports of difficulty moving arm and shoulder due to pain and stiffness.  - Patient experiences limited range of motion affecting daily functionality and activities.  - Will continue to monitor symptoms and assess need for further intervention.    SKIN CONDITIONS (ERYTHEMA INTERTRIGO AND CANDIDIASIS):  - Monitored the rash under the patient's breasts, likely candidiasis, which has improved with current treatment regimen.  - Prescribed antifungal medication for 7 days to be used as needed for ongoing management of the condition.    NICOTINE DEPENDENCE:  - Monitored the patient's smoking habits, noting reduction in frequency.  - Discussed potential impact of smoking on GERD symptoms.  - Patient acknowledges smoking habit and intends to quit after recovery.    MEDICATION MANAGEMENT:  - Continue current medications: Adderall (1 in the morning, 1 in the afternoon), allopurinol, Xanax, Gabapentin, Mirapex, and Protonix.    FOLLOW-UP:  - Follow up monthly for Adderall refills.

## 2025-08-18 DIAGNOSIS — F90.0 ATTENTION DEFICIT HYPERACTIVITY DISORDER (ADHD), PREDOMINANTLY INATTENTIVE TYPE: ICD-10-CM

## 2025-08-18 RX ORDER — DEXTROAMPHETAMINE SACCHARATE, AMPHETAMINE ASPARTATE, DEXTROAMPHETAMINE SULFATE AND AMPHETAMINE SULFATE 7.5; 7.5; 7.5; 7.5 MG/1; MG/1; MG/1; MG/1
30 TABLET ORAL
Qty: 60 TABLET | Refills: 0 | Status: SHIPPED | OUTPATIENT
Start: 2025-08-18 | End: 2025-08-22 | Stop reason: SDUPTHER

## 2025-08-22 ENCOUNTER — OFFICE VISIT (OUTPATIENT)
Dept: INTERNAL MEDICINE | Facility: CLINIC | Age: 60
End: 2025-08-22

## 2025-08-22 VITALS
RESPIRATION RATE: 16 BRPM | BODY MASS INDEX: 31.51 KG/M2 | HEIGHT: 60 IN | WEIGHT: 160.5 LBS | DIASTOLIC BLOOD PRESSURE: 78 MMHG | HEART RATE: 84 BPM | OXYGEN SATURATION: 97 % | SYSTOLIC BLOOD PRESSURE: 132 MMHG

## 2025-08-22 DIAGNOSIS — R25.2 MUSCLE CRAMPS: ICD-10-CM

## 2025-08-22 DIAGNOSIS — K21.00 GASTROESOPHAGEAL REFLUX DISEASE WITH ESOPHAGITIS WITHOUT HEMORRHAGE: ICD-10-CM

## 2025-08-22 DIAGNOSIS — M1A.0710 IDIOPATHIC CHRONIC GOUT OF RIGHT FOOT WITHOUT TOPHUS: ICD-10-CM

## 2025-08-22 DIAGNOSIS — M75.122 NONTRAUMATIC COMPLETE TEAR OF LEFT ROTATOR CUFF: ICD-10-CM

## 2025-08-22 DIAGNOSIS — I10 ESSENTIAL HYPERTENSION: ICD-10-CM

## 2025-08-22 DIAGNOSIS — M48.062 SPINAL STENOSIS OF LUMBAR REGION WITH NEUROGENIC CLAUDICATION: ICD-10-CM

## 2025-08-22 DIAGNOSIS — G25.81 RESTLESS LEGS SYNDROME: ICD-10-CM

## 2025-08-22 DIAGNOSIS — F17.200 NEEDS SMOKING CESSATION EDUCATION: ICD-10-CM

## 2025-08-22 DIAGNOSIS — F90.0 ATTENTION DEFICIT HYPERACTIVITY DISORDER (ADHD), PREDOMINANTLY INATTENTIVE TYPE: Primary | ICD-10-CM

## 2025-08-22 DIAGNOSIS — F41.1 GAD (GENERALIZED ANXIETY DISORDER): ICD-10-CM

## 2025-08-22 PROCEDURE — 99214 OFFICE O/P EST MOD 30 MIN: CPT | Mod: PBBFAC | Performed by: FAMILY MEDICINE

## 2025-08-22 PROCEDURE — 99999 PR PBB SHADOW E&M-EST. PATIENT-LVL IV: CPT | Mod: PBBFAC,,, | Performed by: FAMILY MEDICINE

## 2025-08-22 RX ORDER — POTASSIUM CHLORIDE 20 MEQ/1
20 TABLET, EXTENDED RELEASE ORAL DAILY
Qty: 30 TABLET | Refills: 5 | Status: SHIPPED | OUTPATIENT
Start: 2025-08-22

## 2025-08-22 RX ORDER — PRAMIPEXOLE DIHYDROCHLORIDE 1 MG/1
1 TABLET ORAL NIGHTLY
Qty: 30 TABLET | Refills: 5 | Status: SHIPPED | OUTPATIENT
Start: 2025-08-22

## 2025-08-22 RX ORDER — DEXTROAMPHETAMINE SACCHARATE, AMPHETAMINE ASPARTATE, DEXTROAMPHETAMINE SULFATE AND AMPHETAMINE SULFATE 7.5; 7.5; 7.5; 7.5 MG/1; MG/1; MG/1; MG/1
30 TABLET ORAL
Qty: 60 TABLET | Refills: 0 | Status: SHIPPED | OUTPATIENT
Start: 2025-11-14

## 2025-08-22 RX ORDER — DEXTROAMPHETAMINE SACCHARATE, AMPHETAMINE ASPARTATE, DEXTROAMPHETAMINE SULFATE AND AMPHETAMINE SULFATE 7.5; 7.5; 7.5; 7.5 MG/1; MG/1; MG/1; MG/1
30 TABLET ORAL
Qty: 60 TABLET | Refills: 0 | Status: SHIPPED | OUTPATIENT
Start: 2025-09-17

## 2025-08-22 RX ORDER — ALPRAZOLAM 0.5 MG/1
0.5 TABLET ORAL 2 TIMES DAILY PRN
Qty: 60 TABLET | Refills: 2 | Status: SHIPPED | OUTPATIENT
Start: 2025-08-22

## 2025-08-22 RX ORDER — PANTOPRAZOLE SODIUM 40 MG/1
TABLET, DELAYED RELEASE ORAL
Qty: 30 TABLET | Refills: 5 | Status: SHIPPED | OUTPATIENT
Start: 2025-08-22

## 2025-08-22 RX ORDER — DEXTROAMPHETAMINE SACCHARATE, AMPHETAMINE ASPARTATE, DEXTROAMPHETAMINE SULFATE AND AMPHETAMINE SULFATE 7.5; 7.5; 7.5; 7.5 MG/1; MG/1; MG/1; MG/1
30 TABLET ORAL
Qty: 60 TABLET | Refills: 0 | Status: SHIPPED | OUTPATIENT
Start: 2025-10-16

## 2025-08-22 RX ORDER — GABAPENTIN 300 MG/1
300 CAPSULE ORAL 2 TIMES DAILY
Qty: 60 CAPSULE | Refills: 5 | Status: SHIPPED | OUTPATIENT
Start: 2025-08-22

## 2025-08-22 RX ORDER — ALLOPURINOL 300 MG/1
300 TABLET ORAL DAILY
Qty: 30 TABLET | Refills: 5 | Status: SHIPPED | OUTPATIENT
Start: 2025-08-22

## 2025-08-22 RX ORDER — LOSARTAN POTASSIUM AND HYDROCHLOROTHIAZIDE 25; 100 MG/1; MG/1
1 TABLET ORAL DAILY
Qty: 30 TABLET | Refills: 5 | Status: SHIPPED | OUTPATIENT
Start: 2025-08-22 | End: 2026-08-22

## 2025-08-25 ENCOUNTER — TELEPHONE (OUTPATIENT)
Dept: INTERNAL MEDICINE | Facility: CLINIC | Age: 60
End: 2025-08-25